# Patient Record
Sex: MALE | Race: WHITE | NOT HISPANIC OR LATINO | Employment: FULL TIME | URBAN - METROPOLITAN AREA
[De-identification: names, ages, dates, MRNs, and addresses within clinical notes are randomized per-mention and may not be internally consistent; named-entity substitution may affect disease eponyms.]

---

## 2017-02-27 DIAGNOSIS — E03.9 HYPOTHYROID: ICD-10-CM

## 2017-02-27 RX ORDER — LEVOTHYROXINE SODIUM 75 UG/1
TABLET ORAL
Qty: 90 TABLET | Refills: 1 | Status: SHIPPED | OUTPATIENT
Start: 2017-02-27 | End: 2017-09-13

## 2017-02-27 NOTE — TELEPHONE ENCOUNTER
levothyroxine      Last Written Prescription Date: 6/2/2016  Last Fill Quantity: 90, # refills: 2  Last Office Visit with [facility], [facility] or [facility] prescribing provider: 8/22/2016       Potassium   Date Value Ref Range Status   08/22/2016 4.9 3.4 - 5.3 mmol/L Final     Creatinine   Date Value Ref Range Status   08/22/2016 1.27 (H) 0.66 - 1.25 mg/dL Final     BP Readings from Last 3 Encounters:   01/24/17 94/50   08/22/16 118/74   08/18/16 117/73     Prescription approved per [facility] Refill Protocol.  Tameka Tang RN

## 2017-04-04 ENCOUNTER — HOSPITAL ENCOUNTER (OUTPATIENT)
Dept: CARDIOLOGY | Facility: CLINIC | Age: 52
Discharge: HOME | End: 2017-04-04
Attending: INTERNAL MEDICINE | Admitting: INTERNAL MEDICINE
Payer: COMMERCIAL

## 2017-04-04 DIAGNOSIS — Z98.890 S/P ASCENDING AORTIC ANEURYSM REPAIR: ICD-10-CM

## 2017-04-04 DIAGNOSIS — Z86.79 S/P ASCENDING AORTIC ANEURYSM REPAIR: ICD-10-CM

## 2017-04-04 PROCEDURE — 40000264 ECHO COMPLETE WITH OPTISON

## 2017-04-04 PROCEDURE — 25500064 ZZH RX 255 OP 636: Performed by: INTERNAL MEDICINE

## 2017-04-04 PROCEDURE — 93306 TTE W/DOPPLER COMPLETE: CPT | Mod: 26 | Performed by: INTERNAL MEDICINE

## 2017-04-04 RX ADMIN — HUMAN ALBUMIN MICROSPHERES AND PERFLUTREN 3 ML: 10; .22 INJECTION, SOLUTION INTRAVENOUS at 09:15

## 2017-04-05 ENCOUNTER — OFFICE VISIT (OUTPATIENT)
Dept: CARDIOLOGY | Facility: CLINIC | Age: 52
End: 2017-04-05
Payer: COMMERCIAL

## 2017-04-05 VITALS
SYSTOLIC BLOOD PRESSURE: 116 MMHG | BODY MASS INDEX: 44.1 KG/M2 | HEART RATE: 80 BPM | DIASTOLIC BLOOD PRESSURE: 72 MMHG | HEIGHT: 71 IN | WEIGHT: 315 LBS

## 2017-04-05 DIAGNOSIS — Z86.79 S/P AORTIC ANEURYSM REPAIR: Primary | ICD-10-CM

## 2017-04-05 DIAGNOSIS — Z98.890 S/P AORTIC ANEURYSM REPAIR: Primary | ICD-10-CM

## 2017-04-05 PROCEDURE — 99214 OFFICE O/P EST MOD 30 MIN: CPT | Performed by: INTERNAL MEDICINE

## 2017-04-05 RX ORDER — ACETAMINOPHEN 500 MG
500 TABLET ORAL EVERY 6 HOURS PRN
COMMUNITY

## 2017-04-05 NOTE — LETTER
4/5/2017    Laura Austin MD  [facility]  [address] [place] [address]    RE: Mihcael Esteban       Dear Colleague,    I had the pleasure of seeing Michael Esteban in the [facility].    REASON FOR CLINIC VISIT:  Followup, ascending aorta aneurysm repair.      Mr. Esteban is a very pleasant 51-year-old gentleman with history of ascending aortic aneurysm repair done in 02/2015 by Dr. Lechuga using a Gelweave 30 mm interposition graft.  The patient also had closure of patent foramen ovale at the same time.  In the followup, patient had CT chest done in 02/2016 that showed normal-sized aortic graft without any dilatation.        To be noted, the patient also had a coronary angiogram that showed only mild plaque in the proximal RCA without any evidence of obstructive coronary artery disease.  The patient has other comorbidities of hypertension, morbid obesity with BMI close to 50, obstructive sleep apnea on CPAP.      Today, he is coming for routine followup.  He had an echocardiogram done yesterday that was personally reviewed by me.  That showed normal LV function with mild to moderately dilated aortic root/ascending aorta.  The aortic root was 4 cm and the ascending aorta was measured 4.3 cm.  To be noted, the patient had an MRA done also in 08/2015 that showed normal-sized aortic graft measuring 3.5 cm in maximal diameter.        The patient tells me that unfortunately he got laid off from work last year and was without work for several months, but now has regained a job.  He did lose some weight but has regained it back.  Last year when I saw him, he weighed 358 pounds; today he weighs 354 pounds.  He has cut down his alcohol intake.  Earlier he was drinking a 6-pack of beer every day, but now he finishes a 6 pack in about 2 weeks' time.  He has also cut down on tobacco  and pot intake.  In fact, he tells me that he misses smoking pot,  but now because he has a 's licence; he has not had pot for about a year.       He does stationary bike 2 times a week for about 15-20 minutes.  He gets tired doing that but no shortness of breath or chest pain.  If he uses his upper body, especially if uses his arms he can get some soreness at the site of the surgical incision in the chest.        To be noted, he has a strong family history of aortic aneurysm with father and paternal grandfather both dying of aortic aneurysm in their mid 50s.  Last clinic visit, I strongly recommend the patient to undergo genetic counseling and also recommended patient to get his first-degree relatives evaluated for aortic aneurysm.  Unfortunately, patient did not see the genetic counselor.  He tells me that he is not sure whether he will follow through with this.  He is also somewhat concerned about insurance.        His blood pressure is well controlled on lisinopril and carvedilol.  He also has history of dyslipidemia and is on atorvastatin with lipid panel last year showing LDL of 43.      PHYSICAL EXAMINATION:   VITAL SIGNS:  Blood pressure 116/72, heart rate 80 and regular, weight 54 pounds, BMI of 49.5.   GENERAL:  The patient appears pleasant, comfortable.   NECK:  Normal JVP, no bruit.   CARDIOVASCULAR SYSTEM:  Well-healed midline sternotomy scar, nontender.  S1, S2 normal, no murmur, rub or gallop.   RESPIRATORY SYSTEM:  Clear to auscultation bilaterally.   ABDOMEN:  Soft, obese, nontender.   EXTREMITIES:  No pitting pedal edema.   NEUROLOGICAL:  Alert, oriented x3.   PSYCHIATRIC:  Normal affect.   SKIN:  No obvious rash.     HEENT:  No pallor or icterus.      Transthoracic echocardiogram as noted above; images personally reviewed.     Outpatient Encounter Prescriptions as of 4/5/2017   Medication Sig Dispense Refill     acetaminophen (TYLENOL) 500 MG tablet Take 500 mg by mouth every 6 hours as needed for mild pain       levothyroxine  (SYNTHROID/LEVOTHROID) 75 MCG tablet TAKE 1 TABLET (75 MCG) BY MOUTH DAILY 90 tablet 1     [DISCONTINUED] allopurinol (ZYLOPRIM) 100 MG tablet Take 1 tablet (100 mg) by mouth daily 90 tablet 2     [DISCONTINUED] lisinopril (PRINIVIL,ZESTRIL) 5 MG tablet TAKE 3 TABLETS (15 MG) BY MOUTH DAILY 270 tablet 2     [DISCONTINUED] carvedilol (COREG) 25 MG tablet Take 1 tablet (25 mg) by mouth 2 times daily (with meals) 60 tablet 11     [DISCONTINUED] atorvastatin (LIPITOR) 40 MG tablet TAKE 1 TABLET (40 MG) BY MOUTH AT BEDTIME FOR CHOLESTEROL 90 tablet 2     oxyCODONE (ROXICODONE) 5 MG immediate release tablet Take 1-2 tablets (5-10 mg) by mouth every 3 hours as needed for moderate to severe pain 60 tablet 0     order for DME Equipment being ordered: RESMED AIRSENSE 10 WITH HH AND RESMED AIRFIT P10 PILLOW MASK.       aspirin 81 MG chewable tablet Take 1 tablet (81 mg) by mouth daily 90 tablet 3     [DISCONTINUED] enalapril (VASOTEC) 5 MG tablet Take 5 mg by mouth       [DISCONTINUED] naproxen (NAPROSYN) 500 MG tablet        No facility-administered encounter medications on file as of 4/5/2017.       IMPRESSION AND PLAN:  A very delightful 51-year-old gentleman with history of aortic aneurysm repair done in 02/2015 (to be noted, this was done in the setting of rapidly enlarging aortic aneurysm which increased in size from 4.7 in 09/2014 to 5.2 in 01/2015), PFO closure, minimal coronary artery disease on pre-aortic aneurysm repair coronary angiogram, other of hypertension, obesity, dyslipidemia, obstructive sleep apnea on CPAP.  He also has a solitary kidney as he was a kidney donor in the early 1980s.  It looks like now he has chronic kidney disease, stage III.        He had an echocardiogram done that shows mildly enlarged ascending aorta at around 4.3 cm.  To be noted, post-aortic aneurysm repair CT and MRA haves shown stable size of aortic graft.  At this time, I recommend repeating an MRA aorta angiogram to make sure that  the graft is stable, as on echocardiogram it appears mildly enlarged.        We also talked at length about the importance of losing weight.  We talked about bariatric surgery options.  The patient tells me that he and his wife have talked about it, but he does not want to undergo bariatric surgery.  I also offered him a referral for a comprehensive weight loss bariatric surgery program, but he declined that.  Then we talked at length about watching his caloric intake and first I suggested to him that he should have an honest assessment of how much calorie intake he is taking in.  Normally calorie intake should be around 2694-8994 calories per day, but in order to lose weight there should be calorie deficit of around 500 calories a day.        I again discussed the importance of genetic counseling given that he has strong family history of aortic aneurysm.  The patient is not interested in genetic counseling.  I also strongly suggested to him to get his first-degree relatives evaluated for aortic aneurysm.        Further recommendation will depend upon the results of the MRA.  My office is going to update him with the results of the MRA aortogram.       Again, thank you for allowing me to participate in the care of your patient.      Sincerely,    Jose J Tipton MD     [facility]

## 2017-04-05 NOTE — MR AVS SNAPSHOT
"              After Visit Summary   4/5/2017    Michael Esteban    MRN: [id number]           Patient Information     Date Of Birth          1965        Visit Information        Provider Department      4/5/2017 4:15 PM Jose J Tipton MD [facility]        Today's Diagnoses     S/P aortic aneurysm repair    -  1       Follow-ups after your visit        Future tests that were ordered for you today     Open Future Orders        Priority Expected Expires Ordered    MRI Angiogram chest w & w/o contrast Routine 4/6/2017 4/5/2018 4/5/2017    ECHO COMPLETE WITH OPTISON Routine 3/10/2017 4/14/2017 3/10/2016            Who to contact     If you have questions or need follow up information about today's clinic visit or your schedule please contact [facility] directly at [phone].  Normal or non-critical lab and imaging results will be communicated to you by MyChart, letter or phone within 4 business days after the clinic has received the results. If you do not hear from us within 7 days, please contact the clinic through MyChart or phone. If you have a critical or abnormal lab result, we will notify you by phone as soon as possible.  Submit refill requests through [employer] or call your pharmacy and they will forward the refill request to us. Please allow 3 business days for your refill to be completed.          Additional Information About Your Visit        MyChart Information     [employer] lets you send messages to your doctor, view your test results, renew your prescriptions, schedule appointments and more. To sign up, go to [email or web address] . Click on \"Log in\" on the left side of the screen, which will take you to the Welcome page. Then click on \"Sign up Now\" on the right side of the page.     You will be asked to enter the access code listed below, as well as some personal information. Please follow the directions to " "create your username and password.     Your access code is: [email or web address]  Expires: 2017 10:36 AM     Your access code will  in 90 days. If you need help or a new code, please call your Portal clinic or [phone].        Care EveryWhere ID     This is your Care EveryWhere ID. This could be used by other organizations to access your Portal medical records  [id number]        Your Vitals Were     Pulse Height BMI (Body Mass Index)             80 1.803 m (5' 11\") 49.4 kg/m2          Blood Pressure from Last 3 Encounters:   17 116/72   17 94/50   16 118/74    Weight from Last 3 Encounters:   17 (!) 160.7 kg (354 lb 3.2 oz)   17 (!) 149.7 kg (330 lb)   16 (!) 153.3 kg (338 lb)               Primary Care Provider Office Phone # Fax #    Laura Austin [phone]       [facility] [address] [place] [address]        Thank you!     Thank you for choosing [facility] [place]  for your care. Our goal is always to provide you with excellent care. Hearing back from our patients is one way we can continue to improve our services. Please take a few minutes to complete the written survey that you may receive in the mail after your visit with us. Thank you!             Your Updated Medication List - Protect others around you: Learn how to safely use, store and throw away your medicines at www.disposemymeds.org.          This list is accurate as of: 17  5:07 PM.  Always use your most recent med list.                   Brand Name Dispense Instructions for use    allopurinol 100 MG tablet    ZYLOPRIM    90 tablet    Take 1 tablet (100 mg) by mouth daily       aspirin 81 MG chewable tablet     90 tablet    Take 1 tablet (81 mg) by mouth daily       atorvastatin 40 MG tablet    LIPITOR    90 tablet    TAKE 1 TABLET (40 MG) BY MOUTH AT BEDTIME FOR CHOLESTEROL       carvedilol 25 MG tablet    " COREG    60 tablet    Take 1 tablet (25 mg) by mouth 2 times daily (with meals)       levothyroxine 75 MCG tablet    SYNTHROID/LEVOTHROID    90 tablet    TAKE 1 TABLET (75 MCG) BY MOUTH DAILY       lisinopril 5 MG tablet    PRINIVIL/ZESTRIL    270 tablet    TAKE 3 TABLETS (15 MG) BY MOUTH DAILY       order for DME      Equipment being ordered: RESMED AIRSENSE 10 WITH HH AND RESMED AIRFIT P10 PILLOW MASK.       oxyCODONE 5 MG IR tablet    ROXICODONE    60 tablet    Take 1-2 tablets (5-10 mg) by mouth every 3 hours as needed for moderate to severe pain       TYLENOL 500 MG tablet   Generic drug:  acetaminophen      Take 500 mg by mouth every 6 hours as needed for mild pain

## 2017-04-05 NOTE — PROGRESS NOTES
HPI and Plan:   See dictation(#[id number])    Orders Placed This Encounter   Procedures     MRI Angiogram chest w & w/o contrast       Orders Placed This Encounter   Medications     acetaminophen (TYLENOL) 500 MG tablet     Sig: Take 500 mg by mouth every 6 hours as needed for mild pain       Medications Discontinued During This Encounter   Medication Reason     enalapril (VASOTEC) 5 MG tablet Discontinued by another Health Care Provider     naproxen (NAPROSYN) 500 MG tablet Discontinued by another Health Care Provider         Encounter Diagnosis   Name Primary?     S/P aortic aneurysm repair Yes       CURRENT MEDICATIONS:  Current Outpatient Prescriptions   Medication Sig Dispense Refill     acetaminophen (TYLENOL) 500 MG tablet Take 500 mg by mouth every 6 hours as needed for mild pain       levothyroxine (SYNTHROID/LEVOTHROID) 75 MCG tablet TAKE 1 TABLET (75 MCG) BY MOUTH DAILY 90 tablet 1     allopurinol (ZYLOPRIM) 100 MG tablet Take 1 tablet (100 mg) by mouth daily 90 tablet 2     lisinopril (PRINIVIL,ZESTRIL) 5 MG tablet TAKE 3 TABLETS (15 MG) BY MOUTH DAILY 270 tablet 2     carvedilol (COREG) 25 MG tablet Take 1 tablet (25 mg) by mouth 2 times daily (with meals) 60 tablet 11     atorvastatin (LIPITOR) 40 MG tablet TAKE 1 TABLET (40 MG) BY MOUTH AT BEDTIME FOR CHOLESTEROL 90 tablet 2     oxyCODONE (ROXICODONE) 5 MG immediate release tablet Take 1-2 tablets (5-10 mg) by mouth every 3 hours as needed for moderate to severe pain 60 tablet 0     order for DME Equipment being ordered: RESMED AIRSENSE 10 WITH HH AND RESMED AIRFIT P10 PILLOW MASK.       aspirin 81 MG chewable tablet Take 1 tablet (81 mg) by mouth daily 90 tablet 3       ALLERGIES     Allergies   Allergen Reactions     Thalidomide      Reaction during stress test         PAST MEDICAL HISTORY:  Past Medical History:   Diagnosis Date     Gout      Hyperlipidemia      Hypertension      Hypothyroidism      Kidney donor 1992     donated Left kidney to mother  in      Morbid (severe) obesity due to excess calories (H)     comorbid w/: Htn, Erectile dysfunction, Swelling of legs, Excessively sleep during the day, Awaken from sleep to catch breath, Shortness of Breath with activity, Loud snoring, Stop breathing while asleep.      Thoracic aortic aneurysm without rupture (H)     4.7 cm - s/p repair 2015       PAST SURGICAL HISTORY:  Past Surgical History:   Procedure Laterality Date     ARTHROSCOPY KNEE Right       COLONOSCOPY N/A 2017    Procedure: COLONOSCOPY;  Surgeon: Maikol Faulkner MD;  Location: [facility]     REPAIR ANEURYSM ASCENDING AORTA N/A 2015    Procedure: REPAIR ANEURYSM ASCENDING AORTA;  Surgeon: Monae Lechuga MD;  Location: [facility] OR     REPAIR PATENT FORAMEN OVALE N/A 2015    Procedure: REPAIR PATENT FORAMEN OVALE;  Surgeon: Monae Lechuga MD;  Location: [facility] OR     s/p left nephrectomy - donor to mother       TONSILLECTOMY  age 3        FAMILY HISTORY:  Family History   Problem Relation Age of Onset     Hypertension Mother      Genitourinary Problems Mother      renal failure - lived for 17 years after transplant from pt      Cardiovascular Father      MI at age 59 - heavy smoker      Sleep Apnea Father      Cardiovascular Paternal Grandfather 62     TAA - rupture/      Hypertension Sister        SOCIAL HISTORY:  Social History     Social History     Marital status:      Spouse name: Dacia Marquis     Number of children: 1     Years of education: N/A     Occupational History     really strenuous work  [employer]     Social History Main Topics     Smoking status: Current Some Day Smoker     Types: Pipe, Cigars     Smokeless tobacco: Never Used      Comment: strongly encourage full cessation secondary to TAA. Occ. cigar      Alcohol use 0.0 oz/week     0 Standard drinks or equivalent per week      Comment: couple of beers each week     Drug use: No      Comment: [place] smoke pot in [place]   "    Sexual activity: Yes     Partners: Female      Comment:       Other Topics Concern     Parent/Sibling W/ Cabg, Mi Or Angioplasty Before 65f 55m? No     Caffeine Concern No     0-1 a day     Exercise No     Seat Belt Yes     Social History Narrative    SonPrudencio - : 10/25/2003.                    Review of Systems:  Skin:  Negative       Eyes:  Positive for visual blurring reading glasses  ENT:  Negative      Respiratory:  Positive for sleep apnea;CPAP;dyspnea on exertion     Cardiovascular:    Positive for;fatigue chest soreness  Gastroenterology: Negative      Genitourinary:  not assessed      Musculoskeletal:  Positive for gout;joint pain right shoulder; cramping in hands  Neurologic:  Positive for headaches;numbness or tingling of hands    Psychiatric:  not assessed      Heme/Lymph/Imm:  Negative      Endocrine:  Positive for thyroid disorder      Physical Exam:  Vitals: /72 (BP Location: Right arm, Patient Position: Chair, Cuff Size: Adult Large)  Pulse 80  Ht 1.803 m (5' 11\")  Wt (!) 160.7 kg (354 lb 3.2 oz)  BMI 49.4 kg/m2    Constitutional:           Skin:           Head:           Eyes:           ENT:           Neck:           Chest:             Cardiac:                    Abdomen:           Vascular:                                          Extremities and Back:                 Neurological:                 CC  Laura Austin MD  [address] [address]                  "

## 2017-04-06 NOTE — PROGRESS NOTES
REASON FOR CLINIC VISIT:  Followup, ascending aorta aneurysm repair.      HISTORY OF PRESENT ILLNESS:  Mr. Esteban is a very pleasant 51-year-old gentleman with history of ascending aortic aneurysm repair done in 02/2015 by Dr. Lechuga using a Gelweave 30 mm interposition graft.  The patient also had closure of patent foramen ovale at the same time.  In the followup, patient had CT chest done in 02/2016 that showed normal-sized aortic graft without any dilatation.        To be noted, the patient also had a coronary angiogram that showed only mild plaque in the proximal RCA without any evidence of obstructive coronary artery disease.  The patient has other comorbidities of hypertension, morbid obesity with BMI close to 50, obstructive sleep apnea on CPAP.      Today, he is coming for routine followup.  He had an echocardiogram done yesterday that was personally reviewed by me.  That showed normal LV function with mild to moderately dilated aortic root/ascending aorta.  The aortic root was 4 cm and the ascending aorta was measured 4.3 cm.  To be noted, the patient had an MRA done also in 08/2015 that showed normal-sized aortic graft measuring 3.5 cm in maximal diameter.        The patient tells me that unfortunately he got laid off from work last year and was without work for several months, but now has regained a job.  He did lose some weight but has regained it back.  Last year when I saw him, he weighed 358 pounds; today he weighs 354 pounds.  He has cut down his alcohol intake.  Earlier he was drinking a 6-pack of beer every day, but now he finishes a 6 pack in about 2 weeks' time.  He has also cut down on tobacco  and pot intake.  In fact, he tells me that he misses smoking pot, but now because he has a 's licence; he has not had pot for about a year.       He does stationary bike 2 times a week for about 15-20 minutes.  He gets tired doing that but no shortness of breath or chest pain.  If he  uses his upper body, especially if uses his arms he can get some soreness at the site of the surgical incision in the chest.        To be noted, he has a strong family history of aortic aneurysm with father and paternal grandfather both dying of aortic aneurysm in their mid 50s.  Last clinic visit, I strongly recommend the patient to undergo genetic counseling and also recommended patient to get his first-degree relatives evaluated for aortic aneurysm.  Unfortunately, patient did not see the genetic counselor.  He tells me that he is not sure whether he will follow through with this.  He is also somewhat concerned about insurance.        His blood pressure is well controlled on lisinopril and carvedilol.  He also has history of dyslipidemia and is on atorvastatin with lipid panel last year showing LDL of 43.      PHYSICAL EXAMINATION:   VITAL SIGNS:  Blood pressure 116/72, heart rate 80 and regular, weight 54 pounds, BMI of 49.5.   GENERAL:  The patient appears pleasant, comfortable.   NECK:  Normal JVP, no bruit.   CARDIOVASCULAR SYSTEM:  Well-healed midline sternotomy scar, nontender.  S1, S2 normal, no murmur, rub or gallop.   RESPIRATORY SYSTEM:  Clear to auscultation bilaterally.   ABDOMEN:  Soft, obese, nontender.   EXTREMITIES:  No pitting pedal edema.   NEUROLOGICAL:  Alert, oriented x3.   PSYCHIATRIC:  Normal affect.   SKIN:  No obvious rash.     HEENT:  No pallor or icterus.      Transthoracic echocardiogram as noted above; images personally reviewed.      IMPRESSION AND PLAN:  A very delightful 51-year-old gentleman with history of aortic aneurysm repair done in 02/2015 (to be noted, this was done in the setting of rapidly enlarging aortic aneurysm which increased in size from 4.7 in 09/2014 to 5.2 in 01/2015), PFO closure, minimal coronary artery disease on pre-aortic aneurysm repair coronary angiogram, other of hypertension, obesity, dyslipidemia, obstructive sleep apnea on CPAP.  He also has a solitary  kidney as he was a kidney donor in the early 1980s.  It looks like now he has chronic kidney disease, stage III.        He had an echocardiogram done that shows mildly enlarged ascending aorta at around 4.3 cm.  To be noted, post-aortic aneurysm repair CT and MRA haves shown stable size of aortic graft.  At this time, I recommend repeating an MRA aorta angiogram to make sure that the graft is stable, as on echocardiogram it appears mildly enlarged.        We also talked at length about the importance of losing weight.  We talked about bariatric surgery options.  The patient tells me that he and his wife have talked about it, but he does not want to undergo bariatric surgery.  I also offered him a referral for a comprehensive weight loss bariatric surgery program, but he declined that.  Then we talked at length about watching his caloric intake and first I suggested to him that he should have an honest assessment of how much calorie intake he is taking in.  Normally calorie intake should be around 1145-1899 calories per day, but in order to lose weight there should be calorie deficit of around 500 calories a day.        I again discussed the importance of genetic counseling given that he has strong family history of aortic aneurysm.  The patient is not interested in genetic counseling.  I also strongly suggested to him to get his first-degree relatives evaluated for aortic aneurysm.        Further recommendation will depend upon the results of the MRA.  My office is going to update him with the results of the MRA aortogram.        cc:      Laura Austin MD    [address] [address]         KATHI MORIN MD             D: 2017 16:54   T: 2017 13:00   MT: MONSTER      Name:     JOSEFINA LEDEZMA   MRN:      [id number]        Account:      [id number]   :      1965           Service Date: 2017      Document: [id number]

## 2017-04-21 ENCOUNTER — HOSPITAL ENCOUNTER (OUTPATIENT)
Dept: CARDIOLOGY | Facility: CLINIC | Age: 52
Discharge: HOME | End: 2017-04-21
Attending: INTERNAL MEDICINE | Admitting: INTERNAL MEDICINE
Payer: COMMERCIAL

## 2017-04-21 DIAGNOSIS — Z86.79 S/P AORTIC ANEURYSM REPAIR: ICD-10-CM

## 2017-04-21 DIAGNOSIS — Z98.890 S/P AORTIC ANEURYSM REPAIR: ICD-10-CM

## 2017-04-21 LAB
CREAT BLD-MCNC: 1.1 MG/DL (ref 0.66–1.25)
GFR SERPL CREATININE-BSD FRML MDRD: 70 ML/MIN/1.7M2

## 2017-04-21 PROCEDURE — 82565 ASSAY OF CREATININE: CPT

## 2017-04-21 PROCEDURE — 71555 MRI ANGIO CHEST W OR W/O DYE: CPT

## 2017-04-21 PROCEDURE — 25000132 ZZH RX MED GY IP 250 OP 250 PS 637: Performed by: INTERNAL MEDICINE

## 2017-04-21 PROCEDURE — 71555 MRI ANGIO CHEST W OR W/O DYE: CPT | Mod: 26 | Performed by: INTERNAL MEDICINE

## 2017-04-21 PROCEDURE — 25500064 ZZH RX 255 OP 636: Performed by: INTERNAL MEDICINE

## 2017-04-21 PROCEDURE — A9577 INJ MULTIHANCE: HCPCS | Performed by: INTERNAL MEDICINE

## 2017-04-21 RX ORDER — ONDANSETRON 2 MG/ML
4 INJECTION INTRAMUSCULAR; INTRAVENOUS
Status: DISCONTINUED | OUTPATIENT
Start: 2017-04-21 | End: 2017-04-22 | Stop reason: HOSPADM

## 2017-04-21 RX ORDER — DIAZEPAM 5 MG
5 TABLET ORAL EVERY 30 MIN PRN
Status: DISCONTINUED | OUTPATIENT
Start: 2017-04-21 | End: 2017-04-22 | Stop reason: HOSPADM

## 2017-04-21 RX ORDER — ACYCLOVIR 200 MG/1
0-1 CAPSULE ORAL
Status: DISCONTINUED | OUTPATIENT
Start: 2017-04-21 | End: 2017-04-22 | Stop reason: HOSPADM

## 2017-04-21 RX ORDER — DIPHENHYDRAMINE HYDROCHLORIDE 50 MG/ML
25-50 INJECTION INTRAMUSCULAR; INTRAVENOUS
Status: DISCONTINUED | OUTPATIENT
Start: 2017-04-21 | End: 2017-04-22 | Stop reason: HOSPADM

## 2017-04-21 RX ORDER — METHYLPREDNISOLONE SODIUM SUCCINATE 125 MG/2ML
125 INJECTION, POWDER, LYOPHILIZED, FOR SOLUTION INTRAMUSCULAR; INTRAVENOUS
Status: DISCONTINUED | OUTPATIENT
Start: 2017-04-21 | End: 2017-04-22 | Stop reason: HOSPADM

## 2017-04-21 RX ORDER — DIPHENHYDRAMINE HCL 25 MG
25 CAPSULE ORAL
Status: DISCONTINUED | OUTPATIENT
Start: 2017-04-21 | End: 2017-04-22 | Stop reason: HOSPADM

## 2017-04-21 RX ADMIN — GADOBENATE DIMEGLUMINE 30 ML: 529 INJECTION, SOLUTION INTRAVENOUS at 16:31

## 2017-04-21 RX ADMIN — DIAZEPAM 5 MG: 5 TABLET ORAL at 15:14

## 2017-04-27 ENCOUNTER — TELEPHONE (OUTPATIENT)
Dept: FAMILY MEDICINE | Facility: CLINIC | Age: 52
End: 2017-04-27

## 2017-04-27 NOTE — TELEPHONE ENCOUNTER
Schedule appointment for Monday, May 8th at 10:30 am with Dr. Austin.  Left patient a detailed message regarding the appointment and to call us back if that time would not work for him.    Sunshine Milligan CMA

## 2017-04-27 NOTE — TELEPHONE ENCOUNTER
Reason for Call:  Same Day Appointment, Requested Provider:  Laura Austin MD    PCP: Laura Austin    Reason for visit: discuss MRI and imaging results    Duration of symptoms: ongoing    Have you been treated for this in the past? Yes    Additional comments: Asking for Monday May 8th as he has the day off.  His wife has a procedure he has to drop her off for at 10AM so he can do anytime between 1030 and noon or anytime after about 2PM.    Can we leave a detailed message on this number? YES    Phone number patient can be reached at: Cell number on file:    Telephone Information:   Mobile [phone]       Best Time:     Call taken on 4/27/2017 at 10:00 AM by Trinity Thomas

## 2017-04-28 ENCOUNTER — DOCUMENTATION ONLY (OUTPATIENT)
Dept: CARDIOLOGY | Facility: CLINIC | Age: 52
End: 2017-04-28

## 2017-04-28 NOTE — PATIENT INSTRUCTIONS
Chest MRI   Done post  OV 4-8-2017  Normal thoracic aortic dimensions after ascending aortic aneurysm  repair in 02/2015. No significant changes when compared to MRA of  08/17/2015    Patient has F/U visit with PMD on 5-8-2017

## 2017-05-02 NOTE — PROGRESS NOTES
Luis Tipton: When would you like to repeat and MRA for aorta-Looks like most recent study the graft was stable. Do you want a clinic visit as well? Thanks, Bill        Thoracic MRA with and without contrast and 3D reformations were  performed on a 1.5 T scanner to evaluate aortic dimensions in a  51-year-old male with PFO closure and ascending aortic aneurysm repair  with 30 mm Gelweave graft in 02/2015.      1. The aortic root is normal in size. The ascending aorta, arch, and  descending aorta are normal in diameter. There is no dissection seen.     2. The aortic arch is left sided. There is normal branching of the  arch vessels.     Bi-orthogonal luminal aortic dimensions with prior measurements from   08/17/2015 in parentheses are:     1. Aortic root at the sinuses of Valsalva = 3.9 cm x 3.9 cm (3.9 cm x  3.9 cm)  2. Sinotubular junction = 3.9 cm x 3.8 cm (3.8 cm x 3.8 cm)  3. Proximal anastomosis = 3.1 cm x 3.0 cm  4. Distal anastomosis = 3.2 cm x 3.1 cm  5. Proximal aortic arch (aorta at the origin of the innominate  artery) = 3.8 cm x 3.5 cm  6. Mid-aortic arch (between left common carotid and subclavian  arteries) = 3.0 cm x 2.8 cm  7. Proximal descending thoracic aorta (begins at the isthmus,  approximately 2 cm distal to left subclavian artery) = 2.7 cm x 2.6 cm  8. Mid-descending aorta (midpoint in length between Nos. 6 and 8) =  2.4 cm x 2.4 cm  9. Aorta at diaphragm (2 cm above the celiac axis origin) = 2.4 cm x  2.2 cm  10. Abdominal aorta at the celiac axis origin = 2.2 cm x 2.2 cm         IMPRESSION:     Normal thoracic aortic dimensions after ascending aortic aneurysm  repair in 02/2015. No significant changes when compared to MRA of  08/17/2015.                        IMPRESSION AND PLAN: A very delightful 51-year-old gentleman with history of aortic aneurysm repair done in 02/2015 (to be noted, this was done in the setting of rapidly enlarging aortic aneurysm which increased in size from 4.7 in  09/2014 to 5.2 in 01/2015), PFO closure, minimal coronary artery disease on pre-aortic aneurysm repair coronary angiogram, other of hypertension, obesity, dyslipidemia, obstructive sleep apnea on CPAP. He also has a solitary kidney as he was a kidney donor in the early 1980s. It looks like now he has chronic kidney disease, stage III.       He had an echocardiogram done that shows mildly enlarged ascending aorta at around 4.3 cm. To be noted, post-aortic aneurysm repair CT and MRA haves shown stable size of aortic graft. At this time, I recommend repeating an MRA aorta angiogram to make sure that the graft is stable, as on echocardiogram it appears mildly enlarged.       We also talked at length about the importance of losing weight. We talked about bariatric surgery options. The patient tells me that he and his wife have talked about it, but he does not want to undergo bariatric surgery. I also offered him a referral for a comprehensive weight loss bariatric surgery program, but he declined that. Then we talked at length about watching his caloric intake and first I suggested to him that he should have an honest assessment of how much calorie intake he is taking in. Normally calorie intake should be around 3138-7302 calories per day, but in order to lose weight there should be calorie deficit of around 500 calories a day.       I again discussed the importance of genetic counseling given that he has strong family history of aortic aneurysm. The patient is not interested in genetic counseling. I also strongly suggested to him to get his first-degree relatives evaluated for aortic aneurysm.       Further recommendation will depend upon the results of the MRA. My office is going to update him with the results of the MRA aortogram.

## 2017-05-03 NOTE — PROGRESS NOTES
Contacted patient to convey results of MRA of aorta and Dr. Tipton's recommendations. 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 He told me that he will call me back when he is on a break. Juventino

## 2017-05-03 NOTE — PROGRESS NOTES
Patient called in. Discussed the findings of the MRA showing stable Aorta following graft. Pleased to hear.   Has a f/u with Primary [facility] MD on 5/8/17.   Informed the Dr. Tipton is recommending f/u on a PRN basis.   Verbalized understanding.

## 2017-05-04 NOTE — PROGRESS NOTES
SUBJECTIVE:                                                    Michael Esteban is a 51 year old male who presents to clinic today for the following health issues:    Patient is here today to discuss MRI results: done 4/21/2017:   IMPRESSION:     Normal thoracic aortic dimensions after ascending aortic aneurysm  repair in 02/2015. No significant changes when compared to MRA of  08/17/2015.    Not having chest pain , but with really physical work , digging and lifting ,etc. - gets some mild pulling in his chest.    No chest pain or discomfort with intercourse, but hasn't had intercourse x almost two years secondary to wife's decreased desire.   Doesn't awaken with an erection in the am.  Able to get an erection and masturbate to completion intermittently.   Can lose an erection when having intercourse with wife. Pt requesting rx for viagra - printed off so he can get it from [place] - was too expensive here in [place].     Also needs letter for work regarding weight restriction.   Just gave 2 weeks notice from [facility].      Problem list and histories reviewed & adjusted, as indicated.  Additional history: as documented    Reviewed and updated as needed this visit by clinical staff  Tobacco  Allergies  Meds  Med Hx  Surg Hx  Fam Hx  Soc Hx      Reviewed and updated as needed this visit by Provider       Patient Active Problem List   Diagnosis     history of Thoracic aortic aneurysm without rupture - 4.7 cm on 9/8/2014 increased to 5.2 cm 1/2015 - repaired with gortex graft 2/17/2015      Kidney donor     Hypertension goal BP (blood pressure) < 140/90     Gout- seeing Rheumatology - Dr. Hope - [facility]     Hypothyroidism     Vertigo     Tobacco use disorder- pipe/cigar -started smoking cigars/pipe again 6/2015     Atypical chest pain     History of alcohol abuse-cut down  s/p TAA repair 2/27/2015 - down to 2-3 beers/week     S/P ascending aortic aneurysm repair     Fever      "CARDIOVASCULAR SCREENING; LDL GOAL LESS THAN 100- secondary to hx of TAA repair     SOB (shortness of breath)     BERNADETTE (obstructive sleep apnea)     Pain in joint, ankle and foot     Arthralgia of lower leg     Erectile dysfunction due to diseases classified elsewhere     Morbid obesity with BMI of 50.0-59.9, adult (H)     Morbid (severe) obesity due to excess calories (H)     Umbilical hernia without obstruction and without gangrene       Current Outpatient Prescriptions   Medication Sig Dispense Refill     acetaminophen (TYLENOL) 500 MG tablet Take 500 mg by mouth every 6 hours as needed for mild pain       levothyroxine (SYNTHROID/LEVOTHROID) 75 MCG tablet TAKE 1 TABLET (75 MCG) BY MOUTH DAILY 90 tablet 1     allopurinol (ZYLOPRIM) 100 MG tablet Take 1 tablet (100 mg) by mouth daily 90 tablet 2     lisinopril (PRINIVIL,ZESTRIL) 5 MG tablet TAKE 3 TABLETS (15 MG) BY MOUTH DAILY 270 tablet 2     carvedilol (COREG) 25 MG tablet Take 1 tablet (25 mg) by mouth 2 times daily (with meals) 60 tablet 11     atorvastatin (LIPITOR) 40 MG tablet TAKE 1 TABLET (40 MG) BY MOUTH AT BEDTIME FOR CHOLESTEROL 90 tablet 2     oxyCODONE (ROXICODONE) 5 MG immediate release tablet Take 1-2 tablets (5-10 mg) by mouth every 3 hours as needed for moderate to severe pain 60 tablet 0     order for DME Equipment being ordered: RESMED AIRSENSE 10 WITH HH AND RESMED AIRFIT P10 PILLOW MASK.       aspirin 81 MG chewable tablet Take 1 tablet (81 mg) by mouth daily 90 tablet 3          Allergies   Allergen Reactions     Thalidomide      Reaction during stress test            ROS:no flare ups in gout since    ROS: 12 point ROS neg other than the symptoms noted above    OBJECTIVE:                                                    /82 (BP Location: Left arm, Patient Position: Chair, Cuff Size: Adult Large)  Pulse 70  Temp 98.4  F (36.9  C) (Oral)  Ht 5' 11\" (1.803 m)  Wt (!) 343 lb 3.2 oz (155.7 kg)  SpO2 96%  BMI 47.87 kg/m2  Body mass " index is 47.87 kg/(m^2).   Wt Readings from Last 5 Encounters:   05/08/17 (!) 343 lb 3.2 oz (155.7 kg)   04/05/17 (!) 354 lb 3.2 oz (160.7 kg)   01/24/17 (!) 330 lb (149.7 kg)   08/22/16 (!) 338 lb (153.3 kg)   08/18/16 (!) 340 lb (154.2 kg)     GENERAL: healthy, alert, well nourished, well hydrated, no distress  HENT: ear canals- normal; TMs- normal; Nose- normal; Mouth- no ulcers, no lesions  NECK: no tenderness, no adenopathy, no asymmetry, no masses, no stiffness; thyroid- normal to palpation  RESP: lungs clear to auscultation - no rales, no rhonchi, no wheezes  CV: regular rates and rhythm, normal S1 S2, no S3 or S4 and no murmur, no click or rub -  ABDOMEN: soft, no tenderness, no  hepatosplenomegaly, no masses, normal bowel sounds  MS: extremities- no gross deformities noted, no edema    Echocardiogram 4/4/2017:   Interpretation Summary     The left ventricle is normal in size.  The visual ejection fraction is estimated at 60-65%.  The right ventricle is normal in structure, function and size.  The left atrium is borderline dilated.  The right atrium is borderline dilated.  There is mild trileaflet aortic sclerosis.  Aortic root is normal in size when corrected for age and body size (4.0 cm,  normal up to 4.4 cm)  Ascending aorta size is borderline when corrected for large body size (4.3 cm)  Ascending aorta size unchanged from 9/2014    TSH   Date Value Ref Range Status   08/22/2016 1.49 0.40 - 4.00 mU/L Final       Diagnostic test results:  [facility].      ASSESSMENT/PLAN:                                                        ICD-10-CM    1. history of Thoracic aortic aneurysm without rupture - 4.7 cm on 9/8/2014 increased to 5.2 cm 1/2015 - repaired with gortex graft 2/17/2015  I71.2    2. Morbid (severe) obesity due to excess calories (H) E66.01    3. Essential hypertension with goal blood pressure less than 140/90 I10 Comprehensive metabolic panel     CBC with platelets differential   4. S/P  ascending aortic aneurysm repair Z98.890     Z86.79    5. Acute gout of left foot, unspecified cause M10.9 Albumin Random Urine Quantitative     allopurinol (ZYLOPRIM) 100 MG tablet     Uric acid   6. Screening for prostate cancer Z12.5 PROSTATE SPEC ANTIGEN SCREEN   7. Screen for colon cancer Z12.11    8. Erectile dysfunction due to diseases classified elsewhere N52.1    9. Umbilical hernia without obstruction and without gangrene K42.9    10. CARDIOVASCULAR SCREENING; LDL GOAL LESS THAN 100- secondary to hx of TAA repair Z13.6 Lipid panel reflex to direct LDL   11. Kidney donor Z52.4    12. Hypertension goal BP (blood pressure) < 140/90 I10 lisinopril (PRINIVIL/ZESTRIL) 5 MG tablet     carvedilol (COREG) 25 MG tablet   13. Thoracic aortic aneurysm without rupture (H) I71.2 atorvastatin (LIPITOR) 40 MG tablet     I will call Dr. Tipton re: clearance for sildenafil (Viagra) .  Pt now has Class A license for  and would like to do that.   Please, call or return to clinic or go to the ER immediately if signs or symptoms worsen or fail to improve as anticipated.   See Patient Instructions    Start walking for exercise - If walking outside,   - rain or shine - walk a block, then come home, next day walk   2 blocks , then come home ; and then add a block further from home daily - work up to 30-45minutes daily or 3-4x/week - or can work  up to  3-4 miles briskly daily.       If walking on treadmill or at  the mall, start with 5 minutes first day, then 6 minutes next day , then 7 minutes next day, then 8 minutes next day, etc.   Gradually work up to the above goals.  No stopping.      Establish an exercise regimen. Activity goal: 45 minutes 5 days a week. New exercise routine: cardiovascular workout on exercise equipment, walking and weightlifting. Diet regimen was discussed and plan is self-directed dieting: reduce calories, reduce portions, reduce processed  carbs, increase fruits/vegetables and avoid sweets  "and supervised diet program. Avoid artificial sweeteners and \"diet\" drinks and sodas.       Recheck with me in 6 months again.          Laura Austin MD    [facility]      "

## 2017-05-08 ENCOUNTER — OFFICE VISIT (OUTPATIENT)
Dept: FAMILY MEDICINE | Facility: CLINIC | Age: 52
End: 2017-05-08
Payer: COMMERCIAL

## 2017-05-08 VITALS
BODY MASS INDEX: 44.1 KG/M2 | OXYGEN SATURATION: 96 % | TEMPERATURE: 98.4 F | HEART RATE: 70 BPM | SYSTOLIC BLOOD PRESSURE: 130 MMHG | HEIGHT: 71 IN | DIASTOLIC BLOOD PRESSURE: 82 MMHG | WEIGHT: 315 LBS

## 2017-05-08 DIAGNOSIS — I71.20 THORACIC AORTIC ANEURYSM WITHOUT RUPTURE (H): Primary | ICD-10-CM

## 2017-05-08 DIAGNOSIS — Z12.11 SCREEN FOR COLON CANCER: ICD-10-CM

## 2017-05-08 DIAGNOSIS — I10 ESSENTIAL HYPERTENSION WITH GOAL BLOOD PRESSURE LESS THAN 140/90: ICD-10-CM

## 2017-05-08 DIAGNOSIS — I71.20 THORACIC AORTIC ANEURYSM WITHOUT RUPTURE (H): ICD-10-CM

## 2017-05-08 DIAGNOSIS — I10 HYPERTENSION GOAL BP (BLOOD PRESSURE) < 140/90: ICD-10-CM

## 2017-05-08 DIAGNOSIS — K42.9 UMBILICAL HERNIA WITHOUT OBSTRUCTION AND WITHOUT GANGRENE: ICD-10-CM

## 2017-05-08 DIAGNOSIS — Z13.6 CARDIOVASCULAR SCREENING; LDL GOAL LESS THAN 100: ICD-10-CM

## 2017-05-08 DIAGNOSIS — Z52.4 KIDNEY DONOR: ICD-10-CM

## 2017-05-08 DIAGNOSIS — M10.9 ACUTE GOUT OF LEFT FOOT, UNSPECIFIED CAUSE: ICD-10-CM

## 2017-05-08 DIAGNOSIS — Z12.5 SCREENING FOR PROSTATE CANCER: ICD-10-CM

## 2017-05-08 DIAGNOSIS — Z86.79 S/P ASCENDING AORTIC ANEURYSM REPAIR: ICD-10-CM

## 2017-05-08 DIAGNOSIS — N52.1 ERECTILE DYSFUNCTION DUE TO DISEASES CLASSIFIED ELSEWHERE: ICD-10-CM

## 2017-05-08 DIAGNOSIS — Z98.890 S/P ASCENDING AORTIC ANEURYSM REPAIR: ICD-10-CM

## 2017-05-08 DIAGNOSIS — E66.01 MORBID (SEVERE) OBESITY DUE TO EXCESS CALORIES (H): ICD-10-CM

## 2017-05-08 LAB
ALBUMIN SERPL-MCNC: 3.5 G/DL (ref 3.4–5)
ALP SERPL-CCNC: 60 U/L (ref 40–150)
ALT SERPL W P-5'-P-CCNC: 29 U/L (ref 0–70)
ANION GAP SERPL CALCULATED.3IONS-SCNC: 6 MMOL/L (ref 3–14)
AST SERPL W P-5'-P-CCNC: 12 U/L (ref 0–45)
BASOPHILS # BLD AUTO: 0 10E9/L (ref 0–0.2)
BASOPHILS NFR BLD AUTO: 0.6 %
BILIRUB SERPL-MCNC: 0.5 MG/DL (ref 0.2–1.3)
BUN SERPL-MCNC: 12 MG/DL (ref 7–30)
CALCIUM SERPL-MCNC: 8.5 MG/DL (ref 8.5–10.1)
CHLORIDE SERPL-SCNC: 109 MMOL/L (ref 94–109)
CHOLEST SERPL-MCNC: 76 MG/DL
CO2 SERPL-SCNC: 27 MMOL/L (ref 20–32)
CREAT SERPL-MCNC: 1.15 MG/DL (ref 0.66–1.25)
DIFFERENTIAL METHOD BLD: NORMAL
EOSINOPHIL # BLD AUTO: 0.3 10E9/L (ref 0–0.7)
EOSINOPHIL NFR BLD AUTO: 5 %
ERYTHROCYTE [DISTWIDTH] IN BLOOD BY AUTOMATED COUNT: 13.2 % (ref 10–15)
GFR SERPL CREATININE-BSD FRML MDRD: 67 ML/MIN/1.7M2
GLUCOSE SERPL-MCNC: 91 MG/DL (ref 70–99)
HCT VFR BLD AUTO: 42.2 % (ref 40–53)
HDLC SERPL-MCNC: 30 MG/DL
HGB BLD-MCNC: 13.3 G/DL (ref 13.3–17.7)
LDLC SERPL CALC-MCNC: 31 MG/DL
LYMPHOCYTES # BLD AUTO: 2 10E9/L (ref 0.8–5.3)
LYMPHOCYTES NFR BLD AUTO: 37.3 %
MCH RBC QN AUTO: 30 PG (ref 26.5–33)
MCHC RBC AUTO-ENTMCNC: 31.5 G/DL (ref 31.5–36.5)
MCV RBC AUTO: 95 FL (ref 78–100)
MONOCYTES # BLD AUTO: 0.6 10E9/L (ref 0–1.3)
MONOCYTES NFR BLD AUTO: 11 %
NEUTROPHILS # BLD AUTO: 2.5 10E9/L (ref 1.6–8.3)
NEUTROPHILS NFR BLD AUTO: 46.1 %
NONHDLC SERPL-MCNC: 46 MG/DL
PLATELET # BLD AUTO: 212 10E9/L (ref 150–450)
POTASSIUM SERPL-SCNC: 4.6 MMOL/L (ref 3.4–5.3)
PROT SERPL-MCNC: 6.7 G/DL (ref 6.8–8.8)
PSA SERPL-ACNC: 1.4 UG/L (ref 0–4)
RBC # BLD AUTO: 4.44 10E12/L (ref 4.4–5.9)
SODIUM SERPL-SCNC: 142 MMOL/L (ref 133–144)
TRIGL SERPL-MCNC: 73 MG/DL
URATE SERPL-MCNC: 6.6 MG/DL (ref 3.5–7.2)
WBC # BLD AUTO: 5.4 10E9/L (ref 4–11)

## 2017-05-08 PROCEDURE — 80061 LIPID PANEL: CPT | Performed by: FAMILY MEDICINE

## 2017-05-08 PROCEDURE — 99214 OFFICE O/P EST MOD 30 MIN: CPT | Performed by: FAMILY MEDICINE

## 2017-05-08 PROCEDURE — 84550 ASSAY OF BLOOD/URIC ACID: CPT | Performed by: FAMILY MEDICINE

## 2017-05-08 PROCEDURE — 82043 UR ALBUMIN QUANTITATIVE: CPT | Performed by: FAMILY MEDICINE

## 2017-05-08 PROCEDURE — G0103 PSA SCREENING: HCPCS | Performed by: FAMILY MEDICINE

## 2017-05-08 PROCEDURE — 85025 COMPLETE CBC W/AUTO DIFF WBC: CPT | Performed by: FAMILY MEDICINE

## 2017-05-08 PROCEDURE — 80053 COMPREHEN METABOLIC PANEL: CPT | Performed by: FAMILY MEDICINE

## 2017-05-08 PROCEDURE — 36415 COLL VENOUS BLD VENIPUNCTURE: CPT | Performed by: FAMILY MEDICINE

## 2017-05-08 RX ORDER — ATORVASTATIN CALCIUM 40 MG/1
TABLET, FILM COATED ORAL
Qty: 90 TABLET | Refills: 1 | Status: SHIPPED | OUTPATIENT
Start: 2017-05-08 | End: 2017-11-03

## 2017-05-08 RX ORDER — ALLOPURINOL 100 MG/1
100 TABLET ORAL DAILY
Qty: 90 TABLET | Refills: 3 | Status: SHIPPED | OUTPATIENT
Start: 2017-05-08 | End: 2018-07-11

## 2017-05-08 RX ORDER — LISINOPRIL 5 MG/1
TABLET ORAL
Qty: 270 TABLET | Refills: 3 | Status: SHIPPED | OUTPATIENT
Start: 2017-05-08 | End: 2018-01-15 | Stop reason: DRUGHIGH

## 2017-05-08 RX ORDER — CARVEDILOL 25 MG/1
25 TABLET ORAL 2 TIMES DAILY WITH MEALS
Qty: 180 TABLET | Refills: 1 | Status: SHIPPED | OUTPATIENT
Start: 2017-05-08 | End: 2017-11-03

## 2017-05-08 NOTE — LETTER
[facility]  [address] [place] [id number] [phone]   May 15, 2017    Michael Esteban  [address] [facility] [id number]      Dear Michael,    Here is a summary of your recent test results:    -All of your labs are normal or well controlled currently.   Please stick with your current medications and  Recheck with me again in 3-6 months or sooner if needed.     Your test results are enclosed.      Please contact me if you have any questions.               Thank you very much for trusting [facility]..     Healthy regards,       Laura Austin M.D.          Results for orders placed or performed in visit on 05/08/17   PROSTATE SPEC ANTIGEN SCREEN   Result Value Ref Range    PSA 1.40 0 - 4 ug/L   Lipid panel reflex to direct LDL   Result Value Ref Range    Cholesterol 76 <200 mg/dL    Triglycerides 73 <150 mg/dL    HDL Cholesterol 30 (L) >39 mg/dL    LDL Cholesterol Calculated 31 <100 mg/dL    Non HDL Cholesterol 46 <130 mg/dL   Albumin Random Urine Quantitative   Result Value Ref Range    Creatinine Urine 61 mg/dL    Albumin Urine mg/L 43 mg/L    Albumin Urine mg/g Cr 70.56 (H) 0 - 17 mg/g Cr   Comprehensive metabolic panel   Result Value Ref Range    Sodium 142 133 - 144 mmol/L    Potassium 4.6 3.4 - 5.3 mmol/L    Chloride 109 94 - 109 mmol/L    Carbon Dioxide 27 20 - 32 mmol/L    Anion Gap 6 3 - 14 mmol/L    Glucose 91 70 - 99 mg/dL    Urea Nitrogen 12 7 - 30 mg/dL    Creatinine 1.15 0.66 - 1.25 mg/dL    GFR Estimate 67 >60 mL/min/1.7m2    GFR Estimate If Black 81 >60 mL/min/1.7m2    Calcium 8.5 8.5 - 10.1 mg/dL    Bilirubin Total 0.5 0.2 - 1.3 mg/dL    Albumin 3.5 3.4 - 5.0 g/dL    Protein Total 6.7 (L) 6.8 - 8.8 g/dL    Alkaline Phosphatase 60 40 - 150 U/L    ALT 29 0 - 70 U/L    AST 12 0 - 45 U/L   CBC with platelets differential   Result Value Ref Range    WBC 5.4 4.0 - 11.0 10e9/L    RBC Count  4.44 4.4 - 5.9 10e12/L    Hemoglobin 13.3 13.3 - 17.7 g/dL    Hematocrit 42.2 40.0 - 53.0 %    MCV 95 78 - 100 fl    MCH 30.0 26.5 - 33.0 pg    MCHC 31.5 31.5 - 36.5 g/dL    RDW 13.2 10.0 - 15.0 %    Platelet Count 212 150 - 450 10e9/L    Diff Method Automated Method     % Neutrophils 46.1 %    % Lymphocytes 37.3 %    % Monocytes 11.0 %    % Eosinophils 5.0 %    % Basophils 0.6 %    Absolute Neutrophil 2.5 1.6 - 8.3 10e9/L    Absolute Lymphocytes 2.0 0.8 - 5.3 10e9/L    Absolute Monocytes 0.6 0.0 - 1.3 10e9/L    Absolute Eosinophils 0.3 0.0 - 0.7 10e9/L    Absolute Basophils 0.0 0.0 - 0.2 10e9/L   Uric acid   Result Value Ref Range    Uric Acid 6.6 3.5 - 7.2 mg/dL

## 2017-05-08 NOTE — MR AVS SNAPSHOT
After Visit Summary   5/8/2017    Michael Esteban    MRN: [id number]           Patient Information     Date Of Birth          1965        Visit Information        Provider Department      5/8/2017 10:30 AM Laura Austin MD [facility]        Today's Diagnoses     history of Thoracic aortic aneurysm without rupture - 4.7 cm on 9/8/2014 increased to 5.2 cm 1/2015 - repaired with gortex graft 2/17/2015     -  1    Morbid (severe) obesity due to excess calories (H)        Essential hypertension with goal blood pressure less than 140/90        S/P ascending aortic aneurysm repair        Acute gout of left foot, unspecified cause        Screening for prostate cancer        Screen for colon cancer        Erectile dysfunction due to diseases classified elsewhere        Umbilical hernia without obstruction and without gangrene        CARDIOVASCULAR SCREENING; LDL GOAL LESS THAN 100- secondary to hx of TAA repair        Kidney donor        Hypertension goal BP (blood pressure) < 140/90        Thoracic aortic aneurysm without rupture (H)          Care Instructions    I will call Dr. Tipton re: clearance for sildenafil (Viagra) .  Pt now has Class A license for  and would like to do that.   Please, call or return to clinic or go to the ER immediately if signs or symptoms worsen or fail to improve as anticipated.   See Patient Instructions    Start walking for exercise - If walking outside,   - rain or shine - walk a block, then come home, next day walk   2 blocks , then come home ; and then add a block further from home daily - work up to 30-45minutes daily or 3-4x/week - or can work  up to  3-4 miles briskly daily.       If walking on treadmill or at  the mall, start with 5 minutes first day, then 6 minutes next day , then 7 minutes next day, then 8 minutes next day, etc.   Gradually work up to the above goals.  No stopping.      Establish an exercise regimen.  "Activity goal: 45 minutes 5 days a week. New exercise routine: cardiovascular workout on exercise equipment, walking and weightlifting. Diet regimen was discussed and plan is self-directed dieting: reduce calories, reduce portions, reduce processed  carbs, increase fruits/vegetables and avoid sweets and supervised diet program. Avoid artificial sweeteners and \"diet\" drinks and sodas.       Recheck with me in 6 months again.                Thank you for choosing [facility]  for your Health Care. It was a pleasure seeing you at your visit today. Please contact us with any questions or concerns you may have.                   Laura Austin MD                                  To reach your [facility] care team after hours call:   [phone]    Our clinic hours are:     Monday- 7:30 am - 7:00 pm                             Tuesday through Friday- 7:30 am - 5:00 pm                                        Saturday- 8:00 am - 12:00 pm                  Phone:  [phone]    Our pharmacy hours are:     Monday  8:00 am to 7:00 pm      Tuesday through Friday 8:00am to 6:00pm                        Saturday - 9:00 am to 1:00 pm      Sunday : Closed.              Phone:  [phone]      There is also information available at our web site:  [email or web address]    If your provider ordered any lab tests and you do not receive the results within 10 business days, please call the clinic.    If you need a medication refill please contact your pharmacy.  Please allow 2 business days for your refill to be completed.    Our clinic offers telephone visits and e visits.  Please ask one of your team members to explain more.      Use AVdirectt (secure email communication and access to your chart) to send your primary care provider a message or make an appointment. Ask someone on your Team how to sign up for [employer].                     Follow-ups after your visit        Who to contact     If you " "have questions or need follow up information about today's clinic visit or your schedule please contact [facility] directly at [phone].  Normal or non-critical lab and imaging results will be communicated to you by MyChart, letter or phone within 4 business days after the clinic has received the results. If you do not hear from us within 7 days, please contact the clinic through [employer] or phone. If you have a critical or abnormal lab result, we will notify you by phone as soon as possible.  Submit refill requests through [employer] or call your pharmacy and they will forward the refill request to us. Please allow 3 business days for your refill to be completed.          Additional Information About Your Visit        Welcome FundsharGild Information     [employer] lets you send messages to your doctor, view your test results, renew your prescriptions, schedule appointments and more. To sign up, go to [email or web address] . Click on \"Log in\" on the left side of the screen, which will take you to the Welcome page. Then click on \"Sign up Now\" on the right side of the page.     You will be asked to enter the access code listed below, as well as some personal information. Please follow the directions to create your username and password.     Your access code is: [email or web address]  Expires: 2017 11:38 AM     Your access code will  in 90 days. If you need help or a new code, please call your [place] clinic or [phone].        Care EveryWhere ID     This is your Care EveryWhere ID. This could be used by other organizations to access your [place] medical records  [id number]        Your Vitals Were     Pulse Temperature Height Pulse Oximetry BMI (Body Mass Index)       70 98.4  F (36.9  C) (Oral) 5' 11\" (1.803 m) 96% 47.87 kg/m2        Blood Pressure from Last 3 Encounters:   17 130/82   17 116/72   17 94/50    Weight from Last 3 Encounters:   17 (!) 343 lb 3.2 oz (155.7 kg) "   04/05/17 (!) 354 lb 3.2 oz (160.7 kg)   01/24/17 (!) 330 lb (149.7 kg)              We Performed the Following     Albumin Random Urine Quantitative     CBC with platelets differential     Comprehensive metabolic panel     Lipid panel reflex to direct LDL     PROSTATE SPEC ANTIGEN SCREEN     Uric acid          Where to get your medicines      These medications were sent to [id number] - [place], [place] - [address]  [address] [address], [place] [address]     Phone:  [phone]     allopurinol 100 MG tablet    atorvastatin 40 MG tablet    carvedilol 25 MG tablet    lisinopril 5 MG tablet          Primary Care Provider Office Phone # Fax #    Laura Austin [phone]       [address] [address]        Thank you!     Thank you for choosing [facility]  for your care. Our goal is always to provide you with excellent care. Hearing back from our patients is one way we can continue to improve our services. Please take a few minutes to complete the written survey that you may receive in the mail after your visit with us. Thank you!             Your Updated Medication List - Protect others around you: Learn how to safely use, store and throw away your medicines at www.disposemymeds.org.          This list is accurate as of: 5/8/17 11:38 AM.  Always use your most recent med list.                   Brand Name Dispense Instructions for use    allopurinol 100 MG tablet    ZYLOPRIM    90 tablet    Take 1 tablet (100 mg) by mouth daily       aspirin 81 MG chewable tablet     90 tablet    Take 1 tablet (81 mg) by mouth daily       atorvastatin 40 MG tablet    LIPITOR    90 tablet    TAKE 1 TABLET (40 MG) BY MOUTH AT BEDTIME FOR CHOLESTEROL       carvedilol 25 MG tablet    COREG    180 tablet    Take 1 tablet (25 mg) by mouth 2 times daily (with meals)       levothyroxine 75 MCG tablet    SYNTHROID/LEVOTHROID    90 tablet     TAKE 1 TABLET (75 MCG) BY MOUTH DAILY       lisinopril 5 MG tablet    PRINIVIL/ZESTRIL    270 tablet    TAKE 3 TABLETS (15 MG) BY MOUTH DAILY       order for DME      Equipment being ordered: RESMED AIRSENSE 10 WITH HH AND RESMED AIRFIT P10 PILLOW MASK.       oxyCODONE 5 MG IR tablet    ROXICODONE    60 tablet    Take 1-2 tablets (5-10 mg) by mouth every 3 hours as needed for moderate to severe pain       TYLENOL 500 MG tablet   Generic drug:  acetaminophen      Take 500 mg by mouth every 6 hours as needed for mild pain

## 2017-05-08 NOTE — PATIENT INSTRUCTIONS
"I will call Dr. Tipton re: clearance for sildenafil (Viagra) .  Pt now has Class A license for  and would like to do that.   Please, call or return to clinic or go to the ER immediately if signs or symptoms worsen or fail to improve as anticipated.   See Patient Instructions    Start walking for exercise - If walking outside,   - rain or shine - walk a block, then come home, next day walk   2 blocks , then come home ; and then add a block further from home daily - work up to 30-45minutes daily or 3-4x/week - or can work  up to  3-4 miles briskly daily.       If walking on treadmill or at  the mall, start with 5 minutes first day, then 6 minutes next day , then 7 minutes next day, then 8 minutes next day, etc.   Gradually work up to the above goals.  No stopping.      Establish an exercise regimen. Activity goal: 45 minutes 5 days a week. New exercise routine: cardiovascular workout on exercise equipment, walking and weightlifting. Diet regimen was discussed and plan is self-directed dieting: reduce calories, reduce portions, reduce processed  carbs, increase fruits/vegetables and avoid sweets and supervised diet program. Avoid artificial sweeteners and \"diet\" drinks and sodas.       Recheck with me in 6 months again.                Thank you for choosing [facility]  for your Health Care. It was a pleasure seeing you at your visit today. Please contact us with any questions or concerns you may have.                   Laura Austin MD                                  To reach your [facility] care team after hours call:   [phone]    Our clinic hours are:     Monday- 7:30 am - 7:00 pm                             Tuesday through Friday- 7:30 am - 5:00 pm                                        Saturday- 8:00 am - 12:00 pm                  Phone:  [phone]    Our pharmacy hours are:     Monday  8:00 am to 7:00 pm      Tuesday through Friday 8:00am to " 6:00pm                        Saturday - 9:00 am to 1:00 pm      Sunday : Closed.              Phone:  [phone]      There is also information available at our web site:  [email or web address]    If your provider ordered any lab tests and you do not receive the results within 10 business days, please call the clinic.    If you need a medication refill please contact your pharmacy.  Please allow 2 business days for your refill to be completed.    Our clinic offers telephone visits and e visits.  Please ask one of your team members to explain more.      Use [employer] (secure email communication and access to your chart) to send your primary care provider a message or make an appointment. Ask someone on your Team how to sign up for [employer].

## 2017-05-08 NOTE — NURSING NOTE
"Chief Complaint   Patient presents with     Results       Initial /82 (BP Location: Left arm, Patient Position: Chair, Cuff Size: Adult Large)  Pulse 70  Temp 98.4  F (36.9  C) (Oral)  Ht 5' 11\" (1.803 m)  Wt (!) 343 lb 3.2 oz (155.7 kg)  SpO2 96%  BMI 47.87 kg/m2 Estimated body mass index is 47.87 kg/(m^2) as calculated from the following:    Height as of this encounter: 5' 11\" (1.803 m).    Weight as of this encounter: 343 lb 3.2 oz (155.7 kg).  Medication Reconciliation: complete   Sunshine Milligan CMA  "

## 2017-05-08 NOTE — LETTER
[address] [address]  [phone]          May 8, 2017    RE:  Michael Esteban                                                                                                                                                       [address] [address]            To whom it may concern:    Michael Esteban is under my professional care for    Thoracic aortic aneurysm without rupture (H) - s/p repair in 2/2015.   He  may return to work with the following: lifting restriction 75 pounds, no other restrictions.  Team lift preferred with greater than 50 pounds.         Sincerely,           Laura Austin M.D.

## 2017-05-09 LAB
CREAT UR-MCNC: 61 MG/DL
MICROALBUMIN UR-MCNC: 43 MG/L
MICROALBUMIN/CREAT UR: 70.56 MG/G CR (ref 0–17)

## 2017-09-13 DIAGNOSIS — E03.9 HYPOTHYROID: ICD-10-CM

## 2017-09-13 RX ORDER — LEVOTHYROXINE SODIUM 75 UG/1
TABLET ORAL
Qty: 90 TABLET | Refills: 0 | Status: SHIPPED | OUTPATIENT
Start: 2017-09-13 | End: 2018-01-21

## 2017-09-13 NOTE — TELEPHONE ENCOUNTER
levothyroxine (SYNTHROID/LEVOTHROID) 75 MCG tablet     Last Written Prescription Date: 2/27/2017  Last Quantity: 90 tablet, # refills: x  Last Office Visit with [facility], [facility] or  Health prescribing provider: x        TSH   Date Value Ref Range Status   08/22/2016 1.49 0.40 - 4.00 mU/L Final

## 2017-09-13 NOTE — TELEPHONE ENCOUNTER
LOV 5/8/2017  levothyroxine (SYNTHROID/LEVOTHROID) 75 MCG tablet 90 tablet 1 2/27/2017  No   Sig: TAKE 1 TABLET (75 MCG) BY MOUTH DAILY     Due for an Office visit for further refills, only fill for 30 days         oSl Crane RN, BSN  [facility]

## 2017-10-07 ENCOUNTER — HOSPITAL ENCOUNTER (EMERGENCY)
Facility: CLINIC | Age: 52
Discharge: HOME | End: 2017-10-07
Attending: PHYSICIAN ASSISTANT | Admitting: PHYSICIAN ASSISTANT
Payer: COMMERCIAL

## 2017-10-07 VITALS
TEMPERATURE: 98.8 F | SYSTOLIC BLOOD PRESSURE: 150 MMHG | DIASTOLIC BLOOD PRESSURE: 83 MMHG | OXYGEN SATURATION: 97 % | RESPIRATION RATE: 16 BRPM

## 2017-10-07 DIAGNOSIS — H60.501 ACUTE OTITIS EXTERNA OF RIGHT EAR, UNSPECIFIED TYPE: ICD-10-CM

## 2017-10-07 PROCEDURE — 99282 EMERGENCY DEPT VISIT SF MDM: CPT

## 2017-10-07 RX ORDER — NEOMYCIN SULFATE, POLYMYXIN B SULFATE AND HYDROCORTISONE 10; 3.5; 1 MG/ML; MG/ML; [USP'U]/ML
4 SUSPENSION/ DROPS AURICULAR (OTIC) 4 TIMES DAILY
Qty: 6 ML | Refills: 0 | Status: SHIPPED | OUTPATIENT
Start: 2017-10-07 | End: 2017-10-14

## 2017-10-07 NOTE — ED AVS SNAPSHOT
" [facility] [place] [facility]    [address]    [facility] [address]    Phone:  [phone]    Fax:  [phone]                                       Michael Esteban   MRN: [id number]    Department:  [facility] [place] [facility]   Date of Visit:  10/7/2017           Patient Information     Date Of Birth          1965        Your diagnoses for this visit were:     Acute otitis externa of right ear, unspecified type        You were seen by Kandi Suarez PA-C.      Follow-up Information     Follow up with [facility] [place] [facility].    Specialty:  EMERGENCY MEDICINE    Why:  If symptoms worsen    Contact information:    [address]  [facility] [address] [phone]        Follow up with [facility] In 3 days.    Why:  if not improving    Contact information:    [address] [place] [address] [phone]        Discharge Instructions       Discharge Instructions  Otitis Externa    Today you were seen for otitis externa which is a condition that occurs when the ear canal, which is the area that leads from the outer ear to the ear drum, becomes irritated as a result of an infection, allergy, or skin problem.   The most common symptoms of this are:  pain in the outer ear, especially when the ear is moved, itchiness of the ear, fluid or pus leaking from the ear and difficulty hearing clearly.  \"Swimmer's ear\" is the name for external otitis that occurs in a person who swims frequently.    Generally, every Emergency Department visit should have a follow-up clinic visit with either a primary or a specialty clinic/provider. Please follow-up as instructed by your emergency provider today.    Return to the Emergency Department if:    Your symptoms get worse, or if you develop a severe headache, stiff neck, or new symptoms.    The pain and swelling spread to your face.    You " develop high fever.      Treatment:    Treatment of otitis externa aims to reduce pain and eliminate the infection.   You should take either Advil  (ibuprofen) or Tylenol  (acetaminophen) for control of the ear pain.      Ear drops -- Ear drops are usually prescribed to both reduce pain and swelling and kill the bacteria which causes external otitis. It is important to apply the ear drops correctly so that they reach the ear canal:  o Lie on your side or tilt your head towards the opposite shoulder.  o Fill the ear canal with drops.  o Lie on your side for 20 minutes or place a cotton ball in the ear canal for 20 minutes.  o Finish the entire course of treatment, even if you begin to feel better within a few days.  o Avoid getting ears wet -- during treatment, you should avoid getting the inside of your ears wet. While showering, you can place a cotton ball coated with petroleum jelly in the ear. However, you should not swim for 7 to 10 days after starting treatment. Avoid wearing hearing aids and in-ear headphones until pain improves.  o If a wick has been placed in your ear, please do not remove it until seen by your primary provider or Ear, Nose, and Throat (ENT) specialist as directed.    Prevention:    Do not clean ear canal. Ear wax serves to protect the ears from water, bacteria, and injury. Excessive cleaning or scratching can injure the skin, potentially leading to infection.    Swimming on a regular basis removes some of the ear wax, allowing water to soften the skin. Bacteria, which normally live in the ear canal, can then enter the skin more easily.    Wearing devices that block the ear canals, such as hearing aids, headphones, or ear plugs, can increase the risk of external otitis (if worn frequently) by injuring the skin.    If you swim frequently, experts recommend the following tips to reduce the chance of developing external otitis:    Shake your ears dry after swimming.    Use ear drops after  swimming to prevent ear infections; these are available at most pharmacies without a prescription.    Consider wearing ear plugs made for swimming.  If you were given a prescription for medicine here today, be sure to read all of the information (including the package insert) that comes with your prescription.  This will include important information about the medicine, its side effects, and any warnings that you need to know about.  The pharmacist who fills the prescription can provide more information and answer questions you may have about the medicine.  If you have questions or concerns that the pharmacist cannot address, please call or return to the Emergency Department.   Remember that you can always come back to the Emergency Department if you are not able to see your regular provider in the amount of time listed above, if you get any new symptoms, or if there is anything that worries you.      24 Hour Appointment Hotline       To make an appointment at any [facility], call [phone] ([phone]). If you don't have a family doctor or clinic, we will help you find one. [facility] clinics are conveniently located to serve the needs of you and your family.             Review of your medicines      START taking        Dose / Directions Last dose taken    neomycin-polymyxin-hydrocortisone 3.5-75006-4 otic suspension   Commonly known as:  CORTISPORIN   Dose:  4 drop   Quantity:  6 mL        Place 4 drops into the right ear 4 times daily for 7 days   Refills:  0          Our records show that you are taking the medicines listed below. If these are incorrect, please call your family doctor or clinic.        Dose / Directions Last dose taken    allopurinol 100 MG tablet   Commonly known as:  ZYLOPRIM   Dose:  100 mg   Quantity:  90 tablet        Take 1 tablet (100 mg) by mouth daily   Refills:  3        aspirin 81 MG chewable tablet   Dose:  81 mg   Quantity:  90 tablet        Take 1 tablet (81 mg) by  mouth daily   Refills:  3        atorvastatin 40 MG tablet   Commonly known as:  LIPITOR   Quantity:  90 tablet        TAKE 1 TABLET (40 MG) BY MOUTH AT BEDTIME FOR CHOLESTEROL   Refills:  1        carvedilol 25 MG tablet   Commonly known as:  COREG   Dose:  25 mg   Quantity:  180 tablet        Take 1 tablet (25 mg) by mouth 2 times daily (with meals)   Refills:  1        levothyroxine 75 MCG tablet   Commonly known as:  SYNTHROID/LEVOTHROID   Quantity:  90 tablet        TAKE 1 TABLET BY MOUTH ONCE DAILY   Refills:  0        lisinopril 5 MG tablet   Commonly known as:  PRINIVIL/ZESTRIL   Quantity:  270 tablet        TAKE 3 TABLETS (15 MG) BY MOUTH DAILY   Refills:  3        order for DME        Equipment being ordered: RESMED AIRSENSE 10 WITH HH AND RESMED AIRFIT P10 PILLOW MASK.   Refills:  0        oxyCODONE 5 MG IR tablet   Commonly known as:  ROXICODONE   Dose:  5-10 mg   Quantity:  60 tablet        Take 1-2 tablets (5-10 mg) by mouth every 3 hours as needed for moderate to severe pain   Refills:  0        TYLENOL 500 MG tablet   Dose:  500 mg   Generic drug:  acetaminophen        Take 500 mg by mouth every 6 hours as needed for mild pain   Refills:  0                Prescriptions were sent or printed at these locations (1 Prescription)                   Other Prescriptions                Printed at Department/Unit printer (1 of 1)         neomycin-polymyxin-hydrocortisone (CORTISPORIN) 3.5-60951-3 otic suspension                Orders Needing Specimen Collection     None      Pending Results     No orders found from 10/5/2017 to 10/8/2017.            Pending Culture Results     No orders found from 10/5/2017 to 10/8/2017.            Pending Results Instructions     If you had any lab results that were not finalized at the time of your Discharge, you can call the ED Lab Result RN at [phone]. You will be contacted by this team for any positive Lab results or changes in treatment. The nurses are available 7  days a week from 10A to 6:30P.  You can leave a message 24 hours per day and they will return your call.        Test Results From Your Hospital Stay               Clinical Quality Measure: Blood Pressure Screening     Your blood pressure was checked while you were in the emergency department today. The last reading we obtained was  BP: 150/83 . Please read the guidelines below about what these numbers mean and what you should do about them.  If your systolic blood pressure (the top number) is less than 120 and your diastolic blood pressure (the bottom number) is less than 80, then your blood pressure is normal. There is nothing more that you need to do about it.  If your systolic blood pressure (the top number) is 120-139 or your diastolic blood pressure (the bottom number) is 80-89, your blood pressure may be higher than it should be. You should have your blood pressure rechecked within a year by a primary care provider.  If your systolic blood pressure (the top number) is 140 or greater or your diastolic blood pressure (the bottom number) is 90 or greater, you may have high blood pressure. High blood pressure is treatable, but if left untreated over time it can put you at risk for heart attack, stroke, or kidney failure. You should have your blood pressure rechecked by a primary care provider within the next 4 weeks.  If your provider in the emergency department today gave you specific instructions to follow-up with your doctor or provider even sooner than that, you should follow that instruction and not wait for up to 4 weeks for your follow-up visit.        Thank you for choosing [facility]       Thank you for choosing [facility] for your care. Our goal is always to provide you with excellent care. Hearing back from our patients is one way we can continue to improve our services. Please take a few minutes to complete the written survey that you may receive in the mail after you visit with us. Thank you!       "  MyChart Information     [facility] lets you send messages to your doctor, view your test results, renew your prescriptions, schedule appointments and more. To sign up, go to [email or web address] . Click on \"Log in\" on the left side of the screen, which will take you to the Welcome page. Then click on \"Sign up Now\" on the right side of the page.     You will be asked to enter the access code listed below, as well as some personal information. Please follow the directions to create your username and password.     Your access code is: [email or web address]  Expires: 2018  7:44 PM     Your access code will  in 90 days. If you need help or a new code, please call your [facility] clinic or [phone].        Care EveryWhere ID     This is your Care EveryWhere ID. This could be used by other organizations to access your [facility] medical records  [id number]        Equal Access to Services     CHERIE NEWSOME : Hadfrancis adeno Sofabio, waaxda luqadaha, qaybta kaalmada adeholger, sabas genao. So [facility] [phone].    ATENCIÓN: Si habla español, tiene a leigh disposición servicios gratuitos de asistencia lingüística. Llame al [phone].    We comply with applicable federal civil rights laws and [place] laws. We do not discriminate on the basis of race, color, national origin, age, disability, sex, sexual orientation, or gender identity.            After Visit Summary       This is your record. Keep this with you and show to your community pharmacist(s) and doctor(s) at your next visit.                  "

## 2017-10-07 NOTE — ED AVS SNAPSHOT
[facility] Emergency Department    [address]    [facility] [address]    Phone:  [phone]    Fax:  [phone]                                       Michael Esteban   MRN: [id number]    Department:  [facility]   Date of Visit:  10/7/2017           After Visit Summary Signature Page     I have received my discharge instructions, and my questions have been answered. I have discussed any challenges I see with this plan with the nurse or doctor.    ..........................................................................................................................................  Patient/Patient Representative Signature      ..........................................................................................................................................  Patient Representative Print Name and Relationship to Patient    ..................................................               ................................................  Date                                            Time    ..........................................................................................................................................  Reviewed by Signature/Title    ...................................................              ..............................................  Date                                                            Time

## 2017-10-07 NOTE — LETTER
[facility] EMERGENCY DEPARTMENT  [address]  [facility] [address]  Phone: [phone]  Fax: [phone]    October 7, 2017        Michael Esteban  [facility] [address]          To whom it may concern:    RE: Michael Esteban    Please excuse Juan from work for 3 days should he develop any dizziness related to his ear infection.     Please contact me for questions or concerns.      Sincerely,  Kandi Suarez PA-C

## 2017-10-08 NOTE — DISCHARGE INSTRUCTIONS
"Discharge Instructions  Otitis Externa    Today you were seen for otitis externa which is a condition that occurs when the ear canal, which is the area that leads from the outer ear to the ear drum, becomes irritated as a result of an infection, allergy, or skin problem.   The most common symptoms of this are:  pain in the outer ear, especially when the ear is moved, itchiness of the ear, fluid or pus leaking from the ear and difficulty hearing clearly.  \"Swimmer's ear\" is the name for external otitis that occurs in a person who swims frequently.    Generally, every Emergency Department visit should have a follow-up clinic visit with either a primary or a specialty clinic/provider. Please follow-up as instructed by your emergency provider today.    Return to the Emergency Department if:    Your symptoms get worse, or if you develop a severe headache, stiff neck, or new symptoms.    The pain and swelling spread to your face.    You develop high fever.      Treatment:    Treatment of otitis externa aims to reduce pain and eliminate the infection.   You should take either Advil  (ibuprofen) or Tylenol  (acetaminophen) for control of the ear pain.      Ear drops -- Ear drops are usually prescribed to both reduce pain and swelling and kill the bacteria which causes external otitis. It is important to apply the ear drops correctly so that they reach the ear canal:  o Lie on your side or tilt your head towards the opposite shoulder.  o Fill the ear canal with drops.  o Lie on your side for 20 minutes or place a cotton ball in the ear canal for 20 minutes.  o Finish the entire course of treatment, even if you begin to feel better within a few days.  o Avoid getting ears wet -- during treatment, you should avoid getting the inside of your ears wet. While showering, you can place a cotton ball coated with petroleum jelly in the ear. However, you should not swim for 7 to 10 days after starting treatment. Avoid wearing hearing " aids and in-ear headphones until pain improves.  o If a wick has been placed in your ear, please do not remove it until seen by your primary provider or Ear, Nose, and Throat (ENT) specialist as directed.    Prevention:    Do not clean ear canal. Ear wax serves to protect the ears from water, bacteria, and injury. Excessive cleaning or scratching can injure the skin, potentially leading to infection.    Swimming on a regular basis removes some of the ear wax, allowing water to soften the skin. Bacteria, which normally live in the ear canal, can then enter the skin more easily.    Wearing devices that block the ear canals, such as hearing aids, headphones, or ear plugs, can increase the risk of external otitis (if worn frequently) by injuring the skin.    If you swim frequently, experts recommend the following tips to reduce the chance of developing external otitis:    Shake your ears dry after swimming.    Use ear drops after swimming to prevent ear infections; these are available at most pharmacies without a prescription.    Consider wearing ear plugs made for swimming.  If you were given a prescription for medicine here today, be sure to read all of the information (including the package insert) that comes with your prescription.  This will include important information about the medicine, its side effects, and any warnings that you need to know about.  The pharmacist who fills the prescription can provide more information and answer questions you may have about the medicine.  If you have questions or concerns that the pharmacist cannot address, please call or return to the Emergency Department.   Remember that you can always come back to the Emergency Department if you are not able to see your regular provider in the amount of time listed above, if you get any new symptoms, or if there is anything that worries you.

## 2017-10-08 NOTE — ED PROVIDER NOTES
History     Chief Complaint:  Ear Discomfort     HPI   Michael Esteban is a 52 year old male who presents to the emergency department today for evaluation of ear discomfort. The patient was cutting wood today when he felt something crawl into his right ear. He attempted to clean out his ear with a Q tip and flush it out with water but he still felt something moving. Here, he states he is primarily concerned about this being a tick. He has not felt any movement in his ear here in the ED. No other concerns voiced.     Allergies:  Thalidomide    Medications:    levothyroxine (SYNTHROID/LEVOTHROID) 75 MCG tablet  allopurinol (ZYLOPRIM) 100 MG tablet  lisinopril (PRINIVIL/ZESTRIL) 5 MG tablet  carvedilol (COREG) 25 MG tablet  atorvastatin (LIPITOR) 40 MG tablet  acetaminophen (TYLENOL) 500 MG tablet  oxyCODONE (ROXICODONE) 5 MG immediate release tablet  aspirin 81 MG chewable tablet     Past Medical History:    Gout   Hyperlipidemia   Hypertension   Hypothyroidism   Kidney donor   Morbid (severe) obesity due to excess calories (H)   Thoracic aortic aneurysm without rupture (H)    Past Surgical History:    ARTHROSCOPY KNEE   COLONOSCOPY   REPAIR ANEURYSM ASCENDING AORTA   REPAIR PATENT FORAMEN OVALE   s/p left nephrectomy - donor to mother   TONSILLECTOMY     Family History:    Hypertension   Renal failure  Sleep apnea   TAA    Social History:  The patient was accompanied to the ED by wife.  Smoking Status: Current some day smoker   Smokeless Tobacco: Never used  Alcohol Use: No  Marital Status:   [2]     Review of Systems   HENT:        Ear discomfort    All other systems reviewed and are negative.    Physical Exam   First Vitals:  BP: 150/83  Heart Rate: 73  Temp: 98.8  F (37.1  C)  Resp: 16  SpO2: 97 %      Physical Exam  Nursing note and vitals reviewed.     GENERAL: Alert, mild distress, non toxic appearing.   HEENT: Normal conjunctiva. No scleral icterus. Left EAC and TM normal. Right EAC with small  amount of cerumen, following removal - TM visualized and normal, no notable foreign body in EAC, EAC mildly edematous with white debris present, canal still patent. MMM.   NECK: Supple.  PULMONARY: Breathing comfortable at rest.   NEURO: Alert and oriented. Non-focal.   MUSCULOSKELETAL: Normal range of motion.   SKIN: Skin is warm and dry. No rashes. No pallor or jaundice.   PSYCH: Normal affect and mood.     Emergency Department Course   Emergency Department Course:  Nursing notes and vitals reviewed.  I performed an exam of the patient as documented above.     I discussed the treatment plan with the patient. They expressed understanding of this plan and consented to discharge.    Impression & Plan      Medical Decision Making:  Michael Esteban is a 52 year old male who presents for evaluation of ear discomfort on the right side. He believed a bug may have crawled into his ear or some type of brush as he was working in the yard earlier today. On examination, he did have a small amount of cerumen, no impaction and this was successfully irrigated out. I used a curette to remove some remaining cerumen as well. Following this, there was no evidence of foreign body in the ear, though he did have signs consistent with otitis externa. This may possibly be related to something getting in his ear (though no foreign body on exam) versus his use of q tips. Will place him on antibiotic drops and have him follow up with ENT if not improving in the next 3 days. Contact information provided. Return if increasing pain, fever, decrease in hearing or ear discharge or any other new concerns. The patient was in agreement with plan and discharged in satisfactory condition with all questions answered.      Diagnosis:    ICD-10-CM    1. Acute otitis externa of right ear, unspecified type H60.501      Disposition:   Discharge     Discharge Medications:  New Prescriptions    NEOMYCIN-POLYMYXIN-HYDROCORTISONE (CORTISPORIN) 3.5-56384-1  OTIC SUSPENSION    Place 4 drops into the right ear 4 times daily for 7 days       Scribe Disclosure:  I, Miriamsolange Pelaez, am serving as a scribe at 7:14 PM on 10/7/2017 to document services personally performed by Kandi Suarez PA-C, based on my observations and the provider's statements to me.    10/7/2017   [facility] EMERGENCY DEPARTMENT       Kandi Suarez PA-C  10/07/17 [id number]

## 2017-10-08 NOTE — ED NOTES
Patient was outside working on trees today, and states he has something crawling around in his right ear.

## 2017-11-03 DIAGNOSIS — I10 HYPERTENSION GOAL BP (BLOOD PRESSURE) < 140/90: ICD-10-CM

## 2017-11-03 DIAGNOSIS — I71.20 THORACIC AORTIC ANEURYSM WITHOUT RUPTURE (H): ICD-10-CM

## 2017-11-03 RX ORDER — CARVEDILOL 25 MG/1
TABLET ORAL
Qty: 180 TABLET | Refills: 0 | Status: SHIPPED | OUTPATIENT
Start: 2017-11-03 | End: 2018-08-05

## 2017-11-03 RX ORDER — ATORVASTATIN CALCIUM 40 MG/1
TABLET, FILM COATED ORAL
Qty: 90 TABLET | Refills: 0 | Status: SHIPPED | OUTPATIENT
Start: 2017-11-03 | End: 2017-11-05

## 2017-11-03 NOTE — TELEPHONE ENCOUNTER
Requested Prescriptions   Pending Prescriptions Disp Refills     carvedilol (COREG) 25 MG tablet [Pharmacy Med Name: Carvedilol Oral Tablet 25 MG] 180 tablet 0     Sig: TAKE ONE TABLET BY MOUTH TWICE DAILY with meals    Beta-Blockers Protocol Failed    11/3/2017  7:00 AM       Failed - Blood pressure under 140/90    BP Readings from Last 3 Encounters:   10/07/17 150/83   05/08/17 130/82   04/05/17 116/72                Passed - Patient is age 6 or older       Passed - Recent or future visit with authorizing provider's specialty    Patient had office visit in the last year or has a visit in the next 30 days with authorizing provider.  See chart review.               atorvastatin (LIPITOR) 40 MG tablet [Pharmacy Med Name: Atorvastatin Calcium Oral Tablet 40 MG] 90 tablet 0     Sig: TAKE ONE TABLET BY MOUTH AT BEDTIME for cholesterol    Statins Protocol Passed    11/3/2017  7:00 AM       Passed - LDL on file in past 12 months    Recent Labs   Lab Test  05/08/17   1136   LDL  31            Passed - No abnormal creatine kinase in past 12 months    No lab results found.         Passed - Recent or future visit with authorizing provider    Patient had office visit in the last year or has a visit in the next 30 days with authorizing provider.  See chart review.              Passed - Patient is age 18 or older          Routing refill request to provider for review/approval because:  Labs out of range:  BP      ADE Ramos, RN, PHN  [facility]) [phone]

## 2017-11-03 NOTE — TELEPHONE ENCOUNTER
done x 1 month only. Pt needs recheck office visit before more refills. Please inform pt and  Please assist pt in making appt to be seen. recheck fasting lipids, ast , alt (liver function tests)  in the next month - Please  enter future orders for this and pt  can do this as a lab only appointment.     Also Recheck your blood pressure in our pharmacy in 1 week or sooner if needed.  Have pharmacy send me their note.  No further refills without being seen. Please assist pt in making appt to be seen for office visit as well.

## 2017-11-05 DIAGNOSIS — I71.20 THORACIC AORTIC ANEURYSM WITHOUT RUPTURE (H): ICD-10-CM

## 2017-11-06 RX ORDER — ATORVASTATIN CALCIUM 40 MG/1
TABLET, FILM COATED ORAL
Qty: 90 TABLET | Refills: 1 | Status: SHIPPED | OUTPATIENT
Start: 2017-11-06 | End: 2018-11-15

## 2017-11-06 NOTE — TELEPHONE ENCOUNTER
atorvastatin (LIPITOR) 40 MG tablet 90 tablet 0 11/3/2017  No   Sig: TAKE ONE TABLET BY MOUTH AT BEDTIME for cholesterol     Last Office Visit with G, P or [facility] prescribing provider: 5/8/2017       Lab Results   Component Value Date    CHOL 76 05/08/2017     Lab Results   Component Value Date    HDL 30 05/08/2017     Lab Results   Component Value Date    LDL 31 05/08/2017     Lab Results   Component Value Date    TRIG 73 05/08/2017     Lab Results   Component Value Date    CHOLHDLRATIO 4.0 01/08/2015     Refill per RN protocol     Sol Crane RN, BSN  [facility]

## 2017-11-06 NOTE — TELEPHONE ENCOUNTER
Prescription faxed to [facility].  Patient notified and states he will call back to schedule.    Inge Diego

## 2018-01-15 ENCOUNTER — OFFICE VISIT (OUTPATIENT)
Dept: FAMILY MEDICINE | Facility: CLINIC | Age: 53
End: 2018-01-15
Payer: COMMERCIAL

## 2018-01-15 VITALS
DIASTOLIC BLOOD PRESSURE: 100 MMHG | BODY MASS INDEX: 44.1 KG/M2 | HEART RATE: 76 BPM | TEMPERATURE: 96.8 F | OXYGEN SATURATION: 98 % | HEIGHT: 71 IN | WEIGHT: 315 LBS | SYSTOLIC BLOOD PRESSURE: 146 MMHG

## 2018-01-15 DIAGNOSIS — N52.9 VASCULOGENIC ERECTILE DYSFUNCTION, UNSPECIFIED VASCULOGENIC ERECTILE DYSFUNCTION TYPE: ICD-10-CM

## 2018-01-15 DIAGNOSIS — F17.200 TOBACCO USE DISORDER: ICD-10-CM

## 2018-01-15 DIAGNOSIS — I71.20 THORACIC AORTIC ANEURYSM WITHOUT RUPTURE (H): ICD-10-CM

## 2018-01-15 DIAGNOSIS — Z12.5 SCREENING FOR PROSTATE CANCER: ICD-10-CM

## 2018-01-15 DIAGNOSIS — F10.10 ALCOHOL ABUSE: ICD-10-CM

## 2018-01-15 DIAGNOSIS — E03.9 HYPOTHYROIDISM, UNSPECIFIED TYPE: ICD-10-CM

## 2018-01-15 DIAGNOSIS — I10 ESSENTIAL HYPERTENSION WITH GOAL BLOOD PRESSURE LESS THAN 140/90: Primary | ICD-10-CM

## 2018-01-15 DIAGNOSIS — Z23 NEED FOR PROPHYLACTIC VACCINATION AND INOCULATION AGAINST INFLUENZA: ICD-10-CM

## 2018-01-15 DIAGNOSIS — E66.01 MORBID (SEVERE) OBESITY DUE TO EXCESS CALORIES (H): ICD-10-CM

## 2018-01-15 DIAGNOSIS — Z12.11 SCREEN FOR COLON CANCER: ICD-10-CM

## 2018-01-15 DIAGNOSIS — M1A.09X0 CHRONIC GOUT OF MULTIPLE SITES, UNSPECIFIED CAUSE: ICD-10-CM

## 2018-01-15 DIAGNOSIS — Z13.6 CARDIOVASCULAR SCREENING; LDL GOAL LESS THAN 100: ICD-10-CM

## 2018-01-15 DIAGNOSIS — Z52.4 KIDNEY DONOR: ICD-10-CM

## 2018-01-15 LAB
ERYTHROCYTE [DISTWIDTH] IN BLOOD BY AUTOMATED COUNT: 12.9 % (ref 10–15)
ERYTHROCYTE [SEDIMENTATION RATE] IN BLOOD BY WESTERGREN METHOD: 5 MM/H (ref 0–20)
HCT VFR BLD AUTO: 44.9 % (ref 40–53)
HGB BLD-MCNC: 14.5 G/DL (ref 13.3–17.7)
MCH RBC QN AUTO: 29.8 PG (ref 26.5–33)
MCHC RBC AUTO-ENTMCNC: 32.3 G/DL (ref 31.5–36.5)
MCV RBC AUTO: 92 FL (ref 78–100)
PLATELET # BLD AUTO: 209 10E9/L (ref 150–450)
RBC # BLD AUTO: 4.86 10E12/L (ref 4.4–5.9)
WBC # BLD AUTO: 10.4 10E9/L (ref 4–11)

## 2018-01-15 PROCEDURE — G0103 PSA SCREENING: HCPCS | Performed by: FAMILY MEDICINE

## 2018-01-15 PROCEDURE — 36415 COLL VENOUS BLD VENIPUNCTURE: CPT | Performed by: FAMILY MEDICINE

## 2018-01-15 PROCEDURE — 80053 COMPREHEN METABOLIC PANEL: CPT | Performed by: FAMILY MEDICINE

## 2018-01-15 PROCEDURE — 85027 COMPLETE CBC AUTOMATED: CPT | Performed by: FAMILY MEDICINE

## 2018-01-15 PROCEDURE — 84439 ASSAY OF FREE THYROXINE: CPT | Performed by: FAMILY MEDICINE

## 2018-01-15 PROCEDURE — 84550 ASSAY OF BLOOD/URIC ACID: CPT | Performed by: FAMILY MEDICINE

## 2018-01-15 PROCEDURE — 85652 RBC SED RATE AUTOMATED: CPT | Performed by: FAMILY MEDICINE

## 2018-01-15 PROCEDURE — 84480 ASSAY TRIIODOTHYRONINE (T3): CPT | Performed by: FAMILY MEDICINE

## 2018-01-15 PROCEDURE — 86140 C-REACTIVE PROTEIN: CPT | Performed by: FAMILY MEDICINE

## 2018-01-15 PROCEDURE — 84443 ASSAY THYROID STIM HORMONE: CPT | Performed by: FAMILY MEDICINE

## 2018-01-15 PROCEDURE — 99214 OFFICE O/P EST MOD 30 MIN: CPT | Mod: 25 | Performed by: FAMILY MEDICINE

## 2018-01-15 PROCEDURE — 90471 IMMUNIZATION ADMIN: CPT | Performed by: FAMILY MEDICINE

## 2018-01-15 PROCEDURE — 90686 IIV4 VACC NO PRSV 0.5 ML IM: CPT | Performed by: FAMILY MEDICINE

## 2018-01-15 RX ORDER — SILDENAFIL 50 MG/1
50 TABLET, FILM COATED ORAL DAILY PRN
Qty: 12 TABLET | Refills: 1 | Status: SHIPPED | OUTPATIENT
Start: 2018-01-15 | End: 2018-11-15

## 2018-01-15 RX ORDER — LISINOPRIL 20 MG/1
20 TABLET ORAL DAILY
Qty: 90 TABLET | Refills: 0 | Status: SHIPPED | OUTPATIENT
Start: 2018-01-15 | End: 2018-08-05

## 2018-01-15 NOTE — LETTER
[facility]  [address] [place] [address]                  [phone]   January 19, 2018    Michael Esteban  [address] [facility] [address]      Dear Michael,    Here is a summary of your recent test results:    Liver and gallbladder tests are normal. (ALT,AST, Alk phos, bilirubin), kidney function is a little elevated again  (Cr, GFR), Sodium is normal, Potassium is normal, Calcium is normal, Glucose is normal (diabetes screening test).   -PSA (prostate specific antigen) test is normal.  This indicates a low likelihood of prostate cancer.  ADVISE: yearly recheck.   -TSH (thyroid stimulating hormone) level is normal which indicates normal thyroid function.   -Normal red blood cell (hgb) levels, normal white blood cell count and normal platelet levels.   C-reactive protein and ESR  - sed rate -  generalized indicators of inflammation in the body - were  normal/negative.   Uric acid level= normal.     Please continue your current meds and increase your weight loss efforts for your overall health   This will help your kidneys as well                  Your test results are enclosed.      Please contact me if you have any questions.    In addition, here is a list of due or overdue Health Maintenance reminders.    Health Maintenance Due   Topic Date Due     Colon Cancer Screening - FIT Test - yearly  01/24/2018       Please call us at [phone] (or use Tyba) to address the above recommendations.            Thank you very much for trusting [facility]..     Healthy regards,       Laura Austin M.D.          Results for orders placed or performed in visit on 01/15/18   CBC with platelets   Result Value Ref Range    WBC 10.4 4.0 - 11.0 10e9/L    RBC Count 4.86 4.4 - 5.9 10e12/L    Hemoglobin 14.5 13.3 - 17.7 g/dL    Hematocrit 44.9 40.0 - 53.0 %    MCV 92 78 - 100 fl    MCH 29.8 26.5 - 33.0 pg    MCHC 32.3 31.5 - 36.5  g/dL    RDW 12.9 10.0 - 15.0 %    Platelet Count 209 150 - 450 10e9/L   Comprehensive metabolic panel   Result Value Ref Range    Sodium 143 133 - 144 mmol/L    Potassium 5.0 3.4 - 5.3 mmol/L    Chloride 108 94 - 109 mmol/L    Carbon Dioxide 29 20 - 32 mmol/L    Anion Gap 6 3 - 14 mmol/L    Glucose 87 70 - 99 mg/dL    Urea Nitrogen 18 7 - 30 mg/dL    Creatinine 1.35 (H) 0.66 - 1.25 mg/dL    GFR Estimate 55 (L) >60 mL/min/1.7m2    GFR Estimate If Black 67 >60 mL/min/1.7m2    Calcium 8.9 8.5 - 10.1 mg/dL    Bilirubin Total 0.3 0.2 - 1.3 mg/dL    Albumin 3.9 3.4 - 5.0 g/dL    Protein Total 7.0 6.8 - 8.8 g/dL    Alkaline Phosphatase 56 40 - 150 U/L    ALT 40 0 - 70 U/L    AST 25 0 - 45 U/L   TSH   Result Value Ref Range    TSH 1.90 0.40 - 4.00 mU/L   T4 FREE   Result Value Ref Range    T4 Free 0.93 0.76 - 1.46 ng/dL   T3 total   Result Value Ref Range    Triiodothyronine (T3) 85 60 - 181 ng/dL   CRP inflammation   Result Value Ref Range    CRP Inflammation <2.9 0.0 - 8.0 mg/L   Erythrocyte sedimentation rate auto   Result Value Ref Range    Sed Rate 5 0 - 20 mm/h   Uric acid   Result Value Ref Range    Uric Acid 6.4 3.5 - 7.2 mg/dL   Prostate spec antigen screen   Result Value Ref Range    PSA 1.43 0 - 4 ug/L

## 2018-01-15 NOTE — PATIENT INSTRUCTIONS
Increase lisinopril to 20mg daily.    Recheck your blood pressure in our pharmacy in 1 week or sooner if needed.  Have pharmacy send me their note.    Cut down again on beer /alcohol  to 3 beers/night max and 3 times /week, if that. If you can eliminate alcohol together, that would be great.     We've entered future orders for your fasting lipids  and you can do this as a lab only appointment in the next month.     See Patient Instructions.     Recheck with me again in 6 months or sooner, if needed.     Please work on your weight loss as well.                 Thank you for choosing [facility]  for your Health Care. It was a pleasure seeing you at your visit today. Please contact us with any questions or concerns you may have.                   Laura Austin MD                                  To reach your [facility] care team after hours call:   [phone]    Our clinic hours are:     Monday- 7:30 am - 7:00 pm                             Tuesday through Friday- 7:30 am - 5:00 pm                                        Saturday- 8:00 am - 12:00 pm                  Phone:  [phone]    Our pharmacy hours are:     Monday  8:00 am to 7:00 pm      Tuesday through Friday 8:00am to 6:00pm                        Saturday - 9:00 am to 1:00 pm      Sunday : Closed.              Phone:  [phone]      There is also information available at our web site:  [email or web address]    If your provider ordered any lab tests and you do not receive the results within 10 business days, please call the clinic.    If you need a medication refill please contact your pharmacy.  Please allow 2 business days for your refill to be completed.    Our clinic offers telephone visits and e visits.  Please ask one of your team members to explain more.      Use [employer] (secure email communication and access to your chart) to send your primary care provider a message or make an appointment. Ask someone  on your Team how to sign up for MyChart.

## 2018-01-15 NOTE — NURSING NOTE
"Chief Complaint   Patient presents with     Recheck Medication       Initial /87 (BP Location: Left arm, Patient Position: Chair, Cuff Size: Adult Large)  Pulse 76  Temp 96.8  F (36  C) (Tympanic)  Ht 5' 11\" (1.803 m)  Wt (!) 350 lb 3.2 oz (158.8 kg)  SpO2 98%  BMI 48.84 kg/m2 Estimated body mass index is 48.84 kg/(m^2) as calculated from the following:    Height as of this encounter: 5' 11\" (1.803 m).    Weight as of this encounter: 350 lb 3.2 oz (158.8 kg).  Medication Reconciliation: complete     Natasha Resendiz MA     "

## 2018-01-15 NOTE — MR AVS SNAPSHOT
After Visit Summary   1/15/2018    Michael Esteban    MRN: [id number]           Patient Information     Date Of Birth          1965        Visit Information        Provider Department      1/15/2018 4:00 PM Laura Austin MD [facility]        Today's Diagnoses     Essential hypertension with goal blood pressure less than 140/90- not well controlled today     -  1    Vasculogenic erectile dysfunction, unspecified vasculogenic erectile dysfunction type        history of Thoracic aortic aneurysm without rupture - 4.7 cm on 9/8/2014 increased to 5.2 cm 1/2015 - repaired with gortex graft 2/17/2015         Screen for colon cancer        Need for prophylactic vaccination and inoculation against influenza        CARDIOVASCULAR SCREENING; LDL GOAL LESS THAN 100- secondary to hx of TAA repair        Kidney donor        Hypothyroidism, unspecified type        Tobacco use disorder- pipe/cigar -started smoking cigars/pipe again 6/2015        Alcohol abuse - cut down  s/p TAA repair 2/27/2015 - down to 2-3 beers/week        Chronic gout of multiple sites, unspecified cause        Screening for prostate cancer          Care Instructions    Increase lisinopril to 20mg daily.    Recheck your blood pressure in our pharmacy in 1 week or sooner if needed.  Have pharmacy send me their note.    Cut down again on beer /alcohol  to 3 beers/night max and 3 times /week, if that. If you can eliminate alcohol together, that would be great.     We've entered future orders for your fasting lipids  and you can do this as a lab only appointment in the next month.     See Patient Instructions.     Recheck with me again in 6 months or sooner, if needed.     Please work on your weight loss as well.                 Thank you for choosing [facility]  for your Health Care. It was a pleasure seeing you at your visit today. Please contact us with any questions or concerns you may have.                    Laura Austin MD                                  To reach your [facility] care team after hours call:   [phone]    Our clinic hours are:     Monday- 7:30 am - 7:00 pm                             Tuesday through Friday- 7:30 am - 5:00 pm                                        Saturday- 8:00 am - 12:00 pm                  Phone:  [phone]    Our pharmacy hours are:     Monday  8:00 am to 7:00 pm      Tuesday through Friday 8:00am to 6:00pm                        Saturday - 9:00 am to 1:00 pm      Sunday : Closed.              Phone:  [phone]      There is also information available at our web site:  [email or web address]    If your provider ordered any lab tests and you do not receive the results within 10 business days, please call the clinic.    If you need a medication refill please contact your pharmacy.  Please allow 2 business days for your refill to be completed.    Our clinic offers telephone visits and e visits.  Please ask one of your team members to explain more.      Use [employer] (secure email communication and access to your chart) to send your primary care provider a message or make an appointment. Ask someone on your Team how to sign up for [employer] S[employer].                       Follow-ups after your visit        Future tests that were ordered for you today     Open Future Orders        Priority Expected Expires Ordered    Lipid panel reflex to direct LDL Fasting Routine 1/27/2018 1/15/2019 1/15/2018            Who to contact     If you have questions or need follow up information about today's clinic visit or your schedule please contact [facility] directly at [phone].  Normal or non-critical lab and imaging results will be communicated to you by MyChart, letter or phone within 4 business days after the clinic has received the results. If you do not hear from us within 7 days, please contact the clinic through Sonicohart or phone. If you  "have a critical or abnormal lab result, we will notify you by phone as soon as possible.  Submit refill requests through [employer] or call your pharmacy and they will forward the refill request to us. Please allow 3 business days for your refill to be completed.          Additional Information About Your Visit        miiCardhart Information     [employer] lets you send messages to your doctor, view your test results, renew your prescriptions, schedule appointments and more. To sign up, go to [email or web address] . Click on \"Log in\" on the left side of the screen, which will take you to the Welcome page. Then click on \"Sign up Now\" on the right side of the page.     You will be asked to enter the access code listed below, as well as some personal information. Please follow the directions to create your username and password.     Your access code is: [email or web address]  Expires: 4/15/2018  5:16 PM     Your access code will  in 90 days. If you need help or a new code, please call your [facility] clinic or [phone].        Care EveryWhere ID     This is your Care EveryWhere ID. This could be used by other organizations to access your [facility] medical records  [id number]        Your Vitals Were     Pulse Temperature Height Pulse Oximetry BMI (Body Mass Index)       76 96.8  F (36  C) (Tympanic) 5' 11\" (1.803 m) 98% 48.84 kg/m2        Blood Pressure from Last 3 Encounters:   01/15/18 (!) 146/100   10/07/17 150/83   17 130/82    Weight from Last 3 Encounters:   01/15/18 (!) 350 lb 3.2 oz (158.8 kg)   17 (!) 343 lb 3.2 oz (155.7 kg)   17 (!) 354 lb 3.2 oz (160.7 kg)              We Performed the Following          ADMIN VACCINE, FIRST [59589]     Albumin Random Urine Quantitative with Creat Ratio     CBC with platelets     Comprehensive metabolic panel     CRP inflammation     Erythrocyte sedimentation rate auto     HC FLU VAC PRESRV FREE QUAD SPLIT VIR 3+YRS IM  [62902]     Prostate spec antigen screen     " T3 total     T4 FREE     TSH     UA with Microscopic     Uric acid          Today's Medication Changes          These changes are accurate as of: 1/15/18  5:16 PM.  If you have any questions, ask your nurse or doctor.               Start taking these medicines.        Dose/Directions    sildenafil 50 MG tablet   Commonly known as:  VIAGRA   Used for:  Vasculogenic erectile dysfunction, unspecified vasculogenic erectile dysfunction type   Started by:  Laura Austin MD        Dose:  50 mg   Take 1 tablet (50 mg) by mouth daily as needed 30 min to 4 hrs before sex. Do not use with nitroglycerin, terazosin or doxazosin.   Quantity:  12 tablet   Refills:  1         These medicines have changed or have updated prescriptions.        Dose/Directions    lisinopril 20 MG tablet   Commonly known as:  PRINIVIL/ZESTRIL   This may have changed:    - medication strength  - how much to take  - how to take this  - when to take this  - additional instructions   Used for:  Essential hypertension with goal blood pressure less than 140/90   Changed by:  Laura Austin MD        Dose:  20 mg   Take 1 tablet (20 mg) by mouth daily   Quantity:  90 tablet   Refills:  0            Where to get your medicines      These medications were sent to [id number] [address] [address] [address]     Phone:  [phone]     lisinopril 20 MG tablet         Some of these will need a paper prescription and others can be bought over the counter.  Ask your nurse if you have questions.     Bring a paper prescription for each of these medications     sildenafil 50 MG tablet                Primary Care Provider Office Phone # Fax #    Laura Austin [phone]       [address] [place] [address]        Equal Access to Services     JEREMIE NEWSOME AH: Faye Hollingsworth, joshua deleon, sabas vogel  kayla enriqueaaana ah. So [facility] [phone].    ATENCIÓN: Si roberto carlsola tony, tiene a leigh disposición servicios gratuitos de asistencia lingüística. Wilfrido hidalgo [phone].    We comply with applicable federal civil rights laws and [place] laws. We do not discriminate on the basis of race, color, national origin, age, disability, sex, sexual orientation, or gender identity.            Thank you!     Thank you for choosing [facility]  for your care. Our goal is always to provide you with excellent care. Hearing back from our patients is one way we can continue to improve our services. Please take a few minutes to complete the written survey that you may receive in the mail after your visit with us. Thank you!             Your Updated Medication List - Protect others around you: Learn how to safely use, store and throw away your medicines at www.disposemymeds.org.          This list is accurate as of: 1/15/18  5:16 PM.  Always use your most recent med list.                   Brand Name Dispense Instructions for use Diagnosis    allopurinol 100 MG tablet    ZYLOPRIM    90 tablet    Take 1 tablet (100 mg) by mouth daily    Acute gout of left foot, unspecified cause       aspirin 81 MG chewable tablet     90 tablet    Take 1 tablet (81 mg) by mouth daily    Aortic aneurysm (H)       atorvastatin 40 MG tablet    LIPITOR    90 tablet    TAKE 1 TABLET (40 MG) BY MOUTH AT BEDTIME FOR CHOLESTEROL    Thoracic aortic aneurysm without rupture (H)       carvedilol 25 MG tablet    COREG    180 tablet    TAKE ONE TABLET BY MOUTH TWICE DAILY with meals    Hypertension goal BP (blood pressure) < 140/90       levothyroxine 75 MCG tablet    SYNTHROID/LEVOTHROID    90 tablet    TAKE 1 TABLET BY MOUTH ONCE DAILY    Hypothyroid       lisinopril 20 MG tablet    PRINIVIL/ZESTRIL    90 tablet    Take 1 tablet (20 mg) by mouth daily    Essential hypertension with goal blood pressure less than 140/90       order for DME       Equipment being ordered: RESMED AIRSENSE 10 WITH HH AND RESMED AIRFIT P10 PILLOW MASK.        oxyCODONE IR 5 MG tablet    ROXICODONE    60 tablet    Take 1-2 tablets (5-10 mg) by mouth every 3 hours as needed for moderate to severe pain    Thoracic aortic aneurysm without rupture (H)       sildenafil 50 MG tablet    VIAGRA    12 tablet    Take 1 tablet (50 mg) by mouth daily as needed 30 min to 4 hrs before sex. Do not use with nitroglycerin, terazosin or doxazosin.    Vasculogenic erectile dysfunction, unspecified vasculogenic erectile dysfunction type       TYLENOL 500 MG tablet   Generic drug:  acetaminophen      Take 500 mg by mouth every 6 hours as needed for mild pain

## 2018-01-16 LAB
ALBUMIN SERPL-MCNC: 3.9 G/DL (ref 3.4–5)
ALP SERPL-CCNC: 56 U/L (ref 40–150)
ALT SERPL W P-5'-P-CCNC: 40 U/L (ref 0–70)
ANION GAP SERPL CALCULATED.3IONS-SCNC: 6 MMOL/L (ref 3–14)
AST SERPL W P-5'-P-CCNC: 25 U/L (ref 0–45)
BILIRUB SERPL-MCNC: 0.3 MG/DL (ref 0.2–1.3)
BUN SERPL-MCNC: 18 MG/DL (ref 7–30)
CALCIUM SERPL-MCNC: 8.9 MG/DL (ref 8.5–10.1)
CHLORIDE SERPL-SCNC: 108 MMOL/L (ref 94–109)
CO2 SERPL-SCNC: 29 MMOL/L (ref 20–32)
CREAT SERPL-MCNC: 1.35 MG/DL (ref 0.66–1.25)
CRP SERPL-MCNC: <2.9 MG/L (ref 0–8)
GFR SERPL CREATININE-BSD FRML MDRD: 55 ML/MIN/1.7M2
GLUCOSE SERPL-MCNC: 87 MG/DL (ref 70–99)
POTASSIUM SERPL-SCNC: 5 MMOL/L (ref 3.4–5.3)
PROT SERPL-MCNC: 7 G/DL (ref 6.8–8.8)
PSA SERPL-ACNC: 1.43 UG/L (ref 0–4)
SODIUM SERPL-SCNC: 143 MMOL/L (ref 133–144)
T3 SERPL-MCNC: 85 NG/DL (ref 60–181)
T4 FREE SERPL-MCNC: 0.93 NG/DL (ref 0.76–1.46)
TSH SERPL DL<=0.005 MIU/L-ACNC: 1.9 MU/L (ref 0.4–4)
URATE SERPL-MCNC: 6.4 MG/DL (ref 3.5–7.2)

## 2018-01-21 DIAGNOSIS — E03.9 HYPOTHYROID: ICD-10-CM

## 2018-01-22 RX ORDER — LEVOTHYROXINE SODIUM 75 UG/1
TABLET ORAL
Qty: 30 TABLET | Refills: 6 | Status: SHIPPED | OUTPATIENT
Start: 2018-01-22 | End: 2018-10-03

## 2018-01-22 NOTE — TELEPHONE ENCOUNTER
"Requested Prescriptions   Pending Prescriptions Disp Refills     levothyroxine (SYNTHROID/LEVOTHROID) 75 MCG tablet [Pharmacy Med Name: Levothyroxine Sodium Oral Tablet 75 MCG] 30 tablet 0    Last Written Prescription Date:  9.13.17  Last Fill Quantity: 90 tablet,  # refills: 0   Last Office Visit with [facility] or [facility] prescribing provider:  1.15.18   Future Office Visit:      Sig: TAKE 1 TABLET BY MOUTH ONCE DAILY    Thyroid Protocol Passed    1/21/2018  7:00 AM       Passed - Patient is 12 years or older       Passed - Recent or future visit with authorizing provider's specialty    Patient had office visit in the last year or has a visit in the next 30 days with authorizing provider.  See \"Patient Info\" tab in inbasket, or \"Choose Columns\" in Meds & Orders section of the refill encounter.            Passed - Normal TSH on file in past 12 months    Recent Labs   Lab Test  01/15/18   1717   TSH  1.90                "

## 2018-01-22 NOTE — TELEPHONE ENCOUNTER
Prescription approved per [facility] Refill Protocol.  Tameka Tang RN  [facility]

## 2018-01-24 ENCOUNTER — TELEPHONE (OUTPATIENT)
Dept: FAMILY MEDICINE | Facility: CLINIC | Age: 53
End: 2018-01-24

## 2018-01-24 NOTE — TELEPHONE ENCOUNTER
Prior Authorization Retail Medication Request  Medication/Dose: Sidenafil Citrate 50mg   Diagnosis and ICD code: Vasculogenic erectile dysfunction, unspecified vasculogenic erectile dysfunction type [N52.9]  New/Renewal/Insurance Change PA: New  Previously Tried and Failed Therapies: none    Insurance ID (if provided): [id number]  Insurance Phone (if provided): [phone]    Any additional info from fax request:     If you received a fax notification from an outside Pharmacy:  Pharmacy Name:[facility] [place]  Pharmacy #:[phone]  Pharmacy Fax:[phone]

## 2018-01-25 NOTE — TELEPHONE ENCOUNTER
Central Prior Authorization Team   Phone: [phone]    PA Initiation    Medication: Sidenafil Citrate 50mg   Insurance Company: [employer] - Phone [phone] Fax [phone]  Pharmacy Filling the Rx: [id number] - [place], [place] - [address] [address]  Filling Pharmacy Phone: [phone]  Filling Pharmacy Fax: [phone]  Start Date: 1/24/2018

## 2018-01-26 NOTE — TELEPHONE ENCOUNTER
PA denied.  If provider would like to appeal please provide a letter of medical necessity and route back to PA team.  Otherwise, please close encounter when finished.   Thanks   Brandy (Routing comment)     Provider, please advise on appeal, change in medication or give pt options to Parents R People assistance/"Hey, Neighbor!" discount.  Trinity Thomas

## 2018-01-26 NOTE — TELEPHONE ENCOUNTER
PRIOR AUTHORIZATION DENIED    Medication: Sidenafil Citrate 50mg     Denial Date: 1/25/2018    Denial Rational:  Medication not covered by plan        Appeal Information:  Call [phone]

## 2018-01-27 NOTE — TELEPHONE ENCOUNTER
Looks like all meds to treat erectile dysfunction are NOT covered by this pt's plan, so prior auth wouldn't help.  Please give pt options to copay assistance/[employer] discount.

## 2018-05-07 ENCOUNTER — TRANSFERRED RECORDS (OUTPATIENT)
Dept: HEALTH INFORMATION MANAGEMENT | Facility: CLINIC | Age: 53
End: 2018-05-07

## 2018-05-07 DIAGNOSIS — G47.33 OSA (OBSTRUCTIVE SLEEP APNEA): Primary | ICD-10-CM

## 2018-05-07 LAB
ALT SERPL-CCNC: 28 IU/L (ref 5–40)
AST SERPL-CCNC: 18 U/L (ref 5–34)
CREAT SERPL-MCNC: 1.05 MG/DL (ref 0.5–1.3)
GFR SERPL CREATININE-BSD FRML MDRD: 78.5 ML/MIN/1.73M2

## 2018-07-11 DIAGNOSIS — M10.9 ACUTE GOUT OF LEFT FOOT, UNSPECIFIED CAUSE: ICD-10-CM

## 2018-07-11 RX ORDER — ALLOPURINOL 100 MG/1
TABLET ORAL
Qty: 90 TABLET | Refills: 1 | Status: SHIPPED | OUTPATIENT
Start: 2018-07-11 | End: 2018-11-07

## 2018-07-11 NOTE — TELEPHONE ENCOUNTER
Noted 1/15/18 level said it was normal on result notes.    Prescription approved per [facility] Refill Protocol.  Tameka Tang RN  [facility]

## 2018-07-11 NOTE — TELEPHONE ENCOUNTER
"Requested Prescriptions   Pending Prescriptions Disp Refills     allopurinol (ZYLOPRIM) 100 MG tablet [Pharmacy Med Name: Allopurinol Oral Tablet 100 MG]  Last Written Prescription Date:  5/8/17  Last Fill Quantity: 90,  # refills: 0   Last office visit: 1/15/2018 with prescribing provider:  Norman   Future Office Visit:     90 tablet 2     Sig: TAKE ONE TABLET BY MOUTH ONE TIME DAILY    Gout Agents Protocol Failed    7/11/2018  7:00 AM       Failed - Has Uric Acid on file in past 12 months and value is less than 6    Recent Labs   Lab Test  01/15/18   1717   URIC  6.4     If level is 6mg/dL or greater, ok to refill one time and refer to provider.          Passed - CBC on file in past 12 months    Recent Labs   Lab Test  01/15/18   1717   WBC  10.4   RBC  4.86   HGB  14.5   HCT  44.9   PLT  209       For [facility] ONLY: NVAM146 = WBC, BSED555 = RBC         Passed - ALT on file in past 12 months    Recent Labs   Lab Test 05/07/18   ALT  28          Passed - Recent (12 mo) or future (30 days) visit within the authorizing provider's specialty    Patient had office visit in the last 12 months or has a visit in the next 30 days with authorizing provider or within the authorizing provider's specialty.  See \"Patient Info\" tab in inbasket, or \"Choose Columns\" in Meds & Orders section of the refill encounter.         Passed - Patient is age 18 or older       Passed - Normal serum creatinine on file in the past 12 months    Recent Labs   Lab Test 05/07/18   CR  1.050             "

## 2018-08-05 DIAGNOSIS — I10 HYPERTENSION GOAL BP (BLOOD PRESSURE) < 140/90: ICD-10-CM

## 2018-08-05 DIAGNOSIS — I10 ESSENTIAL HYPERTENSION WITH GOAL BLOOD PRESSURE LESS THAN 140/90: ICD-10-CM

## 2018-08-05 NOTE — LETTER
[facility]  [address] [place] [address]  [phone]  August 10, 2018    Michael Esteban  [address] [address] [facility] [address]    Dear Michael,    I care about your health and have reviewed your health plan. I have reviewed your medical conditions, medication list, and lab results and am making recommendations based on this review, to better manage your health.    You are in particular need of attention regarding:  -medication check    I am recommending that you:  -schedule a FOLLOWUP OFFICE APPOINTMENT with me.         Here is a list of Health Maintenance topics that are due now or due soon:  Health Maintenance Due   Topic Date Due     HIV SCREEN (SYSTEM ASSIGNED)  05/31/1982     Depression Assessment 2 - yearly  01/06/2017     Colon Cancer Screening - FIT Test - yearly  01/24/2018     Cholesterol Lab - yearly  05/08/2018     Microalbumin Lab - yearly  05/08/2018       Please call us at [phone] (or use [employer]) to address the above recommendations.     Thank you for trusting [facility] and we appreciate the opportunity to serve you.  We look forward to supporting your healthcare needs in the future.    Healthy Regards,         Laura Austin M.D.

## 2018-08-06 NOTE — TELEPHONE ENCOUNTER
"Requested Prescriptions   Pending Prescriptions Disp Refills     carvedilol (COREG) 25 MG tablet [Pharmacy Med Name: Carvedilol Oral Tablet 25 MG]  Last Written Prescription Date:  11-3-2017  Last Fill Quantity: 180 TABLET,  # refills: 0   Last office visit: 1/15/2018 with prescribing provider:     Future Office Visit:     180 tablet 0     Sig: TAKE ONE TABLET BY MOUTH TWICE DAILY with meals    Beta-Blockers Protocol Failed    8/5/2018  4:08 PM       Failed - Blood pressure under 140/90 in past 12 months    BP Readings from Last 3 Encounters:   01/15/18 (!) 146/100   10/07/17 150/83   05/08/17 130/82                Passed - Patient is age 6 or older       Passed - Recent (12 mo) or future (30 days) visit within the authorizing provider's specialty    Patient had office visit in the last 12 months or has a visit in the next 30 days with authorizing provider or within the authorizing provider's specialty.  See \"Patient Info\" tab in inbasket, or \"Choose Columns\" in Meds & Orders section of the refill encounter.                  lisinopril (PRINIVIL/ZESTRIL) 20 MG tablet [Pharmacy Med Name: Lisinopril Oral Tablet 20 MG]  Last Written Prescription Date:  1-  Last Fill Quantity:90 TABLET,  # refills: 0   Last office visit: 1/15/2018 with prescribing provider:     Future Office Visit:     90 tablet 0     Sig: Take 1 tablet (20 mg) by mouth daily    ACE Inhibitors (Including Combos) Protocol Failed    8/5/2018  4:08 PM       Failed - Blood pressure under 140/90 in past 12 months    BP Readings from Last 3 Encounters:   01/15/18 (!) 146/100   10/07/17 150/83   05/08/17 130/82                Passed - Recent (12 mo) or future (30 days) visit within the authorizing provider's specialty    Patient had office visit in the last 12 months or has a visit in the next 30 days with authorizing provider or within the authorizing provider's specialty.  See \"Patient Info\" tab in inbasket, or \"Choose Columns\" in Meds & Orders " section of the refill encounter.           Passed - Patient is age 18 or older       Passed - Normal serum creatinine on file in past 12 months    Recent Labs   Lab Test 05/07/18   CR  1.050            Passed - Normal serum potassium on file in past 12 months    Recent Labs   Lab Test  01/15/18   1717   POTASSIUM  5.0

## 2018-08-07 RX ORDER — CARVEDILOL 25 MG/1
TABLET ORAL
Qty: 180 TABLET | Refills: 0 | Status: SHIPPED | OUTPATIENT
Start: 2018-08-07 | End: 2018-11-02

## 2018-08-07 RX ORDER — LISINOPRIL 20 MG/1
TABLET ORAL
Qty: 90 TABLET | Refills: 0 | Status: SHIPPED | OUTPATIENT
Start: 2018-08-07 | End: 2018-11-02

## 2018-08-07 NOTE — TELEPHONE ENCOUNTER
Routing refill request to provider for review/approval because:  bp out of range    Ashley Dumont,RN BSN  [facility]  [phone]

## 2018-08-08 NOTE — TELEPHONE ENCOUNTER
Left non-detailed message for patient to call back.  Please schedule follow up when patient calls back.  (see previous notes for details)    Inge Diego

## 2018-08-10 NOTE — TELEPHONE ENCOUNTER
Second attempt - Left non-detailed message for patient to call back.  Please schedule follow up when patient calls back.  (see previous notes for details)  Letter sent.  Closing encounter.    Inge Diego

## 2018-10-03 DIAGNOSIS — E03.9 HYPOTHYROID: ICD-10-CM

## 2018-10-03 RX ORDER — LEVOTHYROXINE SODIUM 75 UG/1
TABLET ORAL
Qty: 30 TABLET | Refills: 1 | Status: SHIPPED | OUTPATIENT
Start: 2018-10-03 | End: 2018-11-15

## 2018-10-03 NOTE — TELEPHONE ENCOUNTER
"Requested Prescriptions   Pending Prescriptions Disp Refills     levothyroxine (SYNTHROID/LEVOTHROID) 75 MCG tablet [Pharmacy Med Name: Levothyroxine Sodium Oral Tablet 75 MCG] 30 tablet 5      Last Written Prescription Date:  1.22.18  Last Fill Quantity: 30,  # refills: 6   Last office visit: 1/15/2018 with prescribing provider:     Future Office Visit:     Sig: TAKE 1 TABLET BY MOUTH ONCE DAILY    Thyroid Protocol Passed    10/3/2018  7:01 AM       Passed - Patient is 12 years or older       Passed - Recent (12 mo) or future (30 days) visit within the authorizing provider's specialty    Patient had office visit in the last 12 months or has a visit in the next 30 days with authorizing provider or within the authorizing provider's specialty.  See \"Patient Info\" tab in inbasket, or \"Choose Columns\" in Meds & Orders section of the refill encounter.           Passed - Normal TSH on file in past 12 months    Recent Labs   Lab Test  01/15/18   1717   TSH  1.90                "

## 2018-10-03 NOTE — TELEPHONE ENCOUNTER
Medication is being filled for 1 time refill only due to:  Patient needs to be seen because due for phx/labs.   Tameka Tang RN  [facility]

## 2018-11-02 DIAGNOSIS — Z52.4 KIDNEY DONOR: ICD-10-CM

## 2018-11-02 DIAGNOSIS — I10 ESSENTIAL HYPERTENSION WITH GOAL BLOOD PRESSURE LESS THAN 140/90: ICD-10-CM

## 2018-11-02 DIAGNOSIS — I71.20 THORACIC AORTIC ANEURYSM WITHOUT RUPTURE (H): Primary | ICD-10-CM

## 2018-11-02 NOTE — TELEPHONE ENCOUNTER
"Requested Prescriptions   Pending Prescriptions Disp Refills     lisinopril (PRINIVIL/ZESTRIL) 20 MG tablet [Pharmacy Med Name: Lisinopril Oral Tablet 20 MG] 90 tablet 0        Last Written Prescription Date:  8.7.18  Last Fill Quantity: 90,  # refills: 0   Last Office Visit: 1/15/2018   Future Office Visit:      Sig: Take 1 tablet (20 mg) by mouth daily    ACE Inhibitors (Including Combos) Protocol Failed    11/2/2018  7:00 AM       Failed - Blood pressure under 140/90 in past 12 months    BP Readings from Last 3 Encounters:   01/15/18 (!) 146/100   10/07/17 150/83   05/08/17 130/82                Passed - Recent (12 mo) or future (30 days) visit within the authorizing provider's specialty    Patient had office visit in the last 12 months or has a visit in the next 30 days with authorizing provider or within the authorizing provider's specialty.  See \"Patient Info\" tab in inbasket, or \"Choose Columns\" in Meds & Orders section of the refill encounter.             Passed - Patient is age 18 or older       Passed - Normal serum creatinine on file in past 12 months    Recent Labs   Lab Test 05/07/18 04/21/17   1445   CR  1.050   < >   --    CREAT   --    --   1.1    < > = values in this interval not displayed.            Passed - Normal serum potassium on file in past 12 months    Recent Labs   Lab Test  01/15/18   1717   POTASSIUM  5.0             carvedilol (COREG) 25 MG tablet [Pharmacy Med Name: Carvedilol Oral Tablet 25 MG] 180 tablet 0    Last Written Prescription Date:  8.7.18  Last Fill Quantity: 180,  # refills: 0   Last Office Visit: 1/15/2018   Future Office Visit:      Sig: TAKE ONE TABLET BY MOUTH TWICE DAILY with meals    Beta-Blockers Protocol Failed    11/2/2018  7:00 AM       Failed - Blood pressure under 140/90 in past 12 months    BP Readings from Last 3 Encounters:   01/15/18 (!) 146/100   10/07/17 150/83   05/08/17 130/82                Passed - Patient is age 6 or older       Passed - Recent (12 " "mo) or future (30 days) visit within the authorizing provider's specialty    Patient had office visit in the last 12 months or has a visit in the next 30 days with authorizing provider or within the authorizing provider's specialty.  See \"Patient Info\" tab in inbasket, or \"Choose Columns\" in Meds & Orders section of the refill encounter.                "

## 2018-11-05 NOTE — TELEPHONE ENCOUNTER
Due for a BP check - with PCP     Attempt # 1    Called #   Telephone Information:   Mobile [phone]         Left a non detailed VM     Sol Crane RN, BSN  [facility]

## 2018-11-05 NOTE — TELEPHONE ENCOUNTER
Pt called back. Doesn't have time to see provider per patient but wants to make RN visit and lab visit only. What time slot can we use for this patient to get his BP check with Dr. Austin since she is booked out. Also patient wants to know if he has to see PCP. Ok to lm.

## 2018-11-05 NOTE — TELEPHONE ENCOUNTER
Patient advised of need for appointment.  Will refill once scheduled.    Joselyn Sanders, BS, RN, N  [facility]) [phone]

## 2018-11-06 RX ORDER — CARVEDILOL 25 MG/1
TABLET ORAL
Qty: 180 TABLET | Refills: 0 | Status: SHIPPED | OUTPATIENT
Start: 2018-11-06 | End: 2018-11-12

## 2018-11-06 RX ORDER — LISINOPRIL 20 MG/1
TABLET ORAL
Qty: 90 TABLET | Refills: 0 | Status: SHIPPED | OUTPATIENT
Start: 2018-11-06 | End: 2018-11-15

## 2018-11-06 NOTE — TELEPHONE ENCOUNTER
Please see message below     Please advise if pt can just do an RN visit with labs or if pt needs to be seen by PCP     Thank you     Sol Crane RN, BSN  [facility]

## 2018-11-07 DIAGNOSIS — M10.9 ACUTE GOUT OF LEFT FOOT, UNSPECIFIED CAUSE: ICD-10-CM

## 2018-11-07 NOTE — TELEPHONE ENCOUNTER
Pt due for basic metabolic panel to check kidney function.  Must ensure that bp well controlled secondary to his hx of thoracic aortic aneurysm - repaired with PFO closure as well in 2015.     Recommend 20 min appt with me in the next month, please. Ok for acute visit.   BP Readings from Last 3 Encounters:   01/15/18 (!) 146/100   10/07/17 150/83   05/08/17 130/82     Ok for refill x1 in the meantime. Pt is also a kidney donor with only 1 remaining kidney.   TSH   Date Value Ref Range Status   01/15/2018 1.90 0.40 - 4.00 mU/L Final      Please inform patient and Please assist pt in making appt to be seen.

## 2018-11-07 NOTE — TELEPHONE ENCOUNTER
"Requested Prescriptions   Pending Prescriptions Disp Refills     allopurinol (ZYLOPRIM) 100 MG tablet [Pharmacy Med Name: Allopurinol Oral Tablet 100 MG] 90 tablet 0        Last Written Prescription Date:  7.11.18  Last Fill Quantity: 90,  # refills: 1   Last Office Visit: 1/15/2018   Future Office Visit:      Sig: take 1 tablet by mouth daily.    Gout Agents Protocol Failed    11/7/2018  3:24 PM       Failed - Has Uric Acid on file in past 12 months and value is less than 6    Recent Labs   Lab Test  01/15/18   1717   URIC  6.4     If level is 6mg/dL or greater, ok to refill one time and refer to provider.          Passed - CBC on file in past 12 months    Recent Labs   Lab Test  01/15/18   1717   WBC  10.4   RBC  4.86   HGB  14.5   HCT  44.9   PLT  209                Passed - ALT on file in past 12 months    Recent Labs   Lab Test 05/07/18   ALT  28            Passed - Recent (12 mo) or future (30 days) visit within the authorizing provider's specialty    Patient had office visit in the last 12 months or has a visit in the next 30 days with authorizing provider or within the authorizing provider's specialty.  See \"Patient Info\" tab in inbasket, or \"Choose Columns\" in Meds & Orders section of the refill encounter.             Passed - Patient is age 18 or older       Passed - Normal serum creatinine on file in the past 12 months    Recent Labs   Lab Test 05/07/18 04/21/17   1445   CR  1.050   < >   --    CREAT   --    --   1.1    < > = values in this interval not displayed.               "

## 2018-11-08 RX ORDER — ALLOPURINOL 100 MG/1
TABLET ORAL
Qty: 90 TABLET | Refills: 1 | Status: SHIPPED | OUTPATIENT
Start: 2018-11-08 | End: 2019-04-07

## 2018-11-08 NOTE — TELEPHONE ENCOUNTER
Attempt #1  Called patient @ [phone] - Left a non-detailed message to call back and speak with any triage nurse.    Zahra Buck RN  [facility]

## 2018-11-08 NOTE — TELEPHONE ENCOUNTER
Routing refill request to provider for review/approval because:  Labs out of range:  Uric Acid    ADE PalN, RN  [facility]

## 2018-11-12 NOTE — TELEPHONE ENCOUNTER
Pt calling     Advised on the information below     Made an OV for 11/15/2018    Sol Crane RN, BSN  [facility]

## 2018-11-12 NOTE — TELEPHONE ENCOUNTER
Attempt #2  Called # below - Left a non-detailed message to call back and speak with any triage nurse.    Zahra Buck RN  [place]

## 2018-11-15 ENCOUNTER — OFFICE VISIT (OUTPATIENT)
Dept: FAMILY MEDICINE | Facility: CLINIC | Age: 53
End: 2018-11-15
Payer: COMMERCIAL

## 2018-11-15 VITALS
BODY MASS INDEX: 44.1 KG/M2 | HEART RATE: 77 BPM | TEMPERATURE: 98.6 F | DIASTOLIC BLOOD PRESSURE: 88 MMHG | SYSTOLIC BLOOD PRESSURE: 130 MMHG | WEIGHT: 315 LBS | HEIGHT: 71 IN | OXYGEN SATURATION: 93 %

## 2018-11-15 DIAGNOSIS — I10 ESSENTIAL HYPERTENSION WITH GOAL BLOOD PRESSURE LESS THAN 140/90: Primary | ICD-10-CM

## 2018-11-15 DIAGNOSIS — E03.9 HYPOTHYROIDISM, UNSPECIFIED TYPE: ICD-10-CM

## 2018-11-15 DIAGNOSIS — I71.20 THORACIC AORTIC ANEURYSM WITHOUT RUPTURE (H): ICD-10-CM

## 2018-11-15 DIAGNOSIS — I10 ESSENTIAL HYPERTENSION WITH GOAL BLOOD PRESSURE LESS THAN 140/90: ICD-10-CM

## 2018-11-15 DIAGNOSIS — Z11.4 SCREENING FOR HIV (HUMAN IMMUNODEFICIENCY VIRUS): ICD-10-CM

## 2018-11-15 DIAGNOSIS — Z13.6 CARDIOVASCULAR SCREENING; LDL GOAL LESS THAN 100: ICD-10-CM

## 2018-11-15 DIAGNOSIS — E66.01 MORBID (SEVERE) OBESITY DUE TO EXCESS CALORIES (H): ICD-10-CM

## 2018-11-15 DIAGNOSIS — Z12.11 SCREEN FOR COLON CANCER: ICD-10-CM

## 2018-11-15 DIAGNOSIS — Z23 NEED FOR PROPHYLACTIC VACCINATION AND INOCULATION AGAINST INFLUENZA: ICD-10-CM

## 2018-11-15 DIAGNOSIS — Z52.4 KIDNEY DONOR: ICD-10-CM

## 2018-11-15 DIAGNOSIS — N52.9 VASCULOGENIC ERECTILE DYSFUNCTION, UNSPECIFIED VASCULOGENIC ERECTILE DYSFUNCTION TYPE: ICD-10-CM

## 2018-11-15 DIAGNOSIS — M1A.09X0 CHRONIC GOUT OF MULTIPLE SITES, UNSPECIFIED CAUSE: ICD-10-CM

## 2018-11-15 PROCEDURE — 90686 IIV4 VACC NO PRSV 0.5 ML IM: CPT | Performed by: FAMILY MEDICINE

## 2018-11-15 PROCEDURE — 90471 IMMUNIZATION ADMIN: CPT | Performed by: FAMILY MEDICINE

## 2018-11-15 PROCEDURE — 99214 OFFICE O/P EST MOD 30 MIN: CPT | Mod: 25 | Performed by: FAMILY MEDICINE

## 2018-11-15 RX ORDER — LISINOPRIL 20 MG/1
TABLET ORAL
Qty: 90 TABLET | Refills: 1 | Status: SHIPPED | OUTPATIENT
Start: 2018-11-15 | End: 2020-01-08

## 2018-11-15 RX ORDER — LEVOTHYROXINE SODIUM 75 UG/1
75 TABLET ORAL DAILY
Qty: 90 TABLET | Refills: 1 | Status: SHIPPED | OUTPATIENT
Start: 2018-11-15 | End: 2019-07-11

## 2018-11-15 RX ORDER — CARVEDILOL 25 MG/1
TABLET ORAL
Qty: 180 TABLET | Refills: 1 | Status: SHIPPED | OUTPATIENT
Start: 2018-11-15 | End: 2019-10-14

## 2018-11-15 RX ORDER — ATORVASTATIN CALCIUM 40 MG/1
TABLET, FILM COATED ORAL
Qty: 90 TABLET | Refills: 1 | Status: SHIPPED | OUTPATIENT
Start: 2018-11-15 | End: 2019-12-09

## 2018-11-15 RX ORDER — SILDENAFIL 50 MG/1
50 TABLET, FILM COATED ORAL DAILY PRN
Qty: 12 TABLET | Refills: 1 | Status: SHIPPED | OUTPATIENT
Start: 2018-11-15 | End: 2020-01-08

## 2018-11-15 NOTE — NURSING NOTE

## 2018-11-15 NOTE — MR AVS SNAPSHOT
After Visit Summary   11/15/2018    Michael Esteban    MRN: [id number]           Patient Information     Date Of Birth          1965        Visit Information        Provider Department      11/15/2018 2:20 PM Laura Austin MD [facility]        Today's Diagnoses     Essential hypertension with goal blood pressure less than 140/90    -  1    Hypothyroidism, unspecified type        CARDIOVASCULAR SCREENING; LDL GOAL LESS THAN 100- secondary to hx of TAA repair        Screen for colon cancer        Screening for HIV (human immunodeficiency virus)        Need for prophylactic vaccination and inoculation against influenza        Thoracic aortic aneurysm without rupture (H)        Essential hypertension with goal blood pressure less than 140/90- not well controlled today         history of Thoracic aortic aneurysm without rupture - 4.7 cm on 9/8/2014 increased to 5.2 cm 1/2015 - repaired with gortex graft 2/17/2015         Vasculogenic erectile dysfunction, unspecified vasculogenic erectile dysfunction type          Care Instructions    Start walking for exercise - slowly work up to 45-60 min per day.     Restart your lipitor and your levothyroxine and then in 4-6 weeks schedule a fasting lab only visit at our clinic or  You can have this done at any [facility] without appt during their regular outpatient lab hours or by lab only appt at any [place] clinic.       Strongly recommended help with weight loss - medifast, weight watchers, nutrisystem, ideal protein, or  overeaters anonymous - HOW program.       See Patient Instructions.                Thank you for choosing [facility]  for your Health Care. It was a pleasure seeing you at your visit today. Please contact us with any questions or concerns you may have.                   Laura Austin MD                                  To reach your [facility] care team after  hours call:   [phone]    Our clinic hours are:     Monday- 7:30 am - 7:00 pm                             Tuesday through Friday- 7:30 am - 5:00 pm                                        Saturday- 8:00 am - 12:00 pm                  Phone:  [phone]    Our pharmacy hours are:     Monday  8:00 am to 7:00 pm      Tuesday through Friday 8:00am to 6:00pm                        Saturday - 9:00 am to 1:00 pm      Sunday : Closed.              Phone:  [phone]      There is also information available at our web site:  [email or web address]    If your provider ordered any lab tests and you do not receive the results within 10 business days, please call the clinic.    If you need a medication refill please contact your pharmacy.  Please allow 2 business days for your refill to be completed.    Our clinic offers telephone visits and e visits.  Please ask one of your team members to explain more.      Use [email or web address] (secure email communication and access to your chart) to send your primary care provider a message or make an appointment. Ask someone on your Team how to sign up for [email or web address].                           Follow-ups after your visit        Follow-up notes from your care team     Return in about 6 months (around 5/15/2019) for BP Recheck, Physical Exam, Lab Work, Routine Visit.      Future tests that were ordered for you today     Open Future Orders        Priority Expected Expires Ordered    HIV Screening Routine 12/17/2018 2/15/2019 11/15/2018    Lipid panel reflex to direct LDL Fasting Routine 12/17/2018 2/15/2019 11/15/2018    Albumin Random Urine Quantitative with Creat Ratio Routine 12/17/2018 2/15/2019 11/15/2018    Fecal colorectal cancer screen FIT - Future (S+30) Routine 12/6/2018 12/15/2018 11/15/2018    CBC with platelets Routine 12/17/2018 2/15/2019 11/15/2018    Comprehensive metabolic panel Routine 12/17/2018 2/15/2019 11/15/2018    TSH Routine 12/17/2018 2/15/2019 11/15/2018    T4 FREE Routine  "2018 2/15/2019 11/15/2018    T3 total Routine 2018 2/15/2019 11/15/2018            Who to contact     If you have questions or need follow up information about today's clinic visit or your schedule please contact [facility] directly at [phone].  Normal or non-critical lab and imaging results will be communicated to you by MyChart, letter or phone within 4 business days after the clinic has received the results. If you do not hear from us within 7 days, please contact the clinic through [employer] or phone. If you have a critical or abnormal lab result, we will notify you by phone as soon as possible.  Submit refill requests through [employer] or call your pharmacy and they will forward the refill request to us. Please allow 3 business days for your refill to be completed.          Additional Information About Your Visit        MyChart Information     [employer] lets you send messages to your doctor, view your test results, renew your prescriptions, schedule appointments and more. To sign up, go to [email or web address] . Click on \"Log in\" on the left side of the screen, which will take you to the Welcome page. Then click on \"Sign up Now\" on the right side of the page.     You will be asked to enter the access code listed below, as well as some personal information. Please follow the directions to create your username and password.     Your access code is: [email or web address]  Expires: 2019  3:43 PM     Your access code will  in 90 days. If you need help or a new code, please call your [facility] clinic or [phone].        Care EveryWhere ID     This is your Care EveryWhere ID. This could be used by other organizations to access your [facility] medical records  [id number]        Your Vitals Were     Pulse Temperature Height Pulse Oximetry BMI (Body Mass Index)       77 98.6  F (37  C) (Oral) 5' 11\" (1.803 m) 93% 47.89 kg/m2        Blood Pressure from Last 3 Encounters:   11/15/18 " 130/88   01/15/18 (!) 146/100   10/07/17 150/83    Weight from Last 3 Encounters:   11/15/18 (!) 343 lb 6.4 oz (155.8 kg)   01/15/18 (!) 350 lb 3.2 oz (158.8 kg)   05/08/17 (!) 343 lb 3.2 oz (155.7 kg)              We Performed the Following          ADMIN VACCINE, FIRST [36595]     HC FLU VAC PRESRV FREE QUAD SPLIT VIR 3+YRS IM  [35106]          Today's Medication Changes          These changes are accurate as of 11/15/18  3:43 PM.  If you have any questions, ask your nurse or doctor.               These medicines have changed or have updated prescriptions.        Dose/Directions    levothyroxine 75 MCG tablet   Commonly known as:  SYNTHROID/LEVOTHROID   This may have changed:  See the new instructions.   Used for:  Hypothyroidism, unspecified type   Changed by:  Laura Austin MD        Dose:  75 mcg   Take 1 tablet (75 mcg) by mouth daily   Quantity:  90 tablet   Refills:  1            Where to get your medicines      These medications were [id number] [address] [address] [address]     Phone:  [phone]     atorvastatin 40 MG tablet    carvedilol 25 MG tablet    levothyroxine 75 MCG tablet    lisinopril 20 MG tablet    sildenafil 50 MG tablet                Primary Care Provider Office Phone # Fax #    Laura Austin [phone]       [address] [place] [address]        Equal Access to Services     [facility] AH: Hadii lina leach hadasho Soomaali, waaxda luqadaha, qaybta kaalmada adeegyada, sabas genao. So [facility] [phone].    ATENCIÓN: Si roberto carlosla tony, tiene a leigh disposición servicios gratuitos de asistencia lingüística. Llame al [phone].    We comply with applicable federal civil rights laws and [place] laws. We do not discriminate on the basis of race, color, national origin, age, disability, sex, sexual orientation, or gender identity.            Thank you!      Thank you for choosing [facility]  for your care. Our goal is always to provide you with excellent care. Hearing back from our patients is one way we can continue to improve our services. Please take a few minutes to complete the written survey that you may receive in the mail after your visit with us. Thank you!             Your Updated Medication List - Protect others around you: Learn how to safely use, store and throw away your medicines at www.disposemymeds.org.          This list is accurate as of 11/15/18  3:43 PM.  Always use your most recent med list.                   Brand Name Dispense Instructions for use Diagnosis    allopurinol 100 MG tablet    ZYLOPRIM    90 tablet    take 1 tablet by mouth daily.    Acute gout of left foot, unspecified cause       aspirin 81 MG chewable tablet     90 tablet    Take 1 tablet (81 mg) by mouth daily    Aortic aneurysm (H)       atorvastatin 40 MG tablet    LIPITOR    90 tablet    TAKE 1 TABLET (40 MG) BY MOUTH AT BEDTIME FOR CHOLESTEROL    Thoracic aortic aneurysm without rupture (H)       carvedilol 25 MG tablet    COREG    180 tablet    TAKE ONE TABLET BY MOUTH TWICE DAILY with meals    Essential hypertension with goal blood pressure less than 140/90, Thoracic aortic aneurysm without rupture (H)       levothyroxine 75 MCG tablet    SYNTHROID/LEVOTHROID    90 tablet    Take 1 tablet (75 mcg) by mouth daily    Hypothyroidism, unspecified type       lisinopril 20 MG tablet    PRINIVIL/ZESTRIL    90 tablet    Take 1 tablet (20 mg) by mouth daily    Essential hypertension with goal blood pressure less than 140/90, Thoracic aortic aneurysm without rupture (H)       order for DME      Equipment being ordered: RESMED AIRSENSE 10 WITH HH AND RESMED AIRFIT P10 PILLOW MASK.        oxyCODONE IR 5 MG tablet    ROXICODONE    60 tablet    Take 1-2 tablets (5-10 mg) by mouth every 3 hours as needed for moderate to severe pain    Thoracic aortic aneurysm without  rupture (H)       sildenafil 50 MG tablet    VIAGRA    12 tablet    Take 1 tablet (50 mg) by mouth daily as needed 30 min to 4 hrs before sex. Do not use with nitroglycerin, terazosin or doxazosin.    Vasculogenic erectile dysfunction, unspecified vasculogenic erectile dysfunction type       TYLENOL 500 MG tablet   Generic drug:  acetaminophen      Take 500 mg by mouth every 6 hours as needed for mild pain

## 2018-11-15 NOTE — PROGRESS NOTES
SUBJECTIVE:                                                    Michael Esteban is a 53 year old male who presents to clinic today for the following health issues:    Hyperlipidemia Follow-Up: ran out of lipitor about 6 months ago.       Rate your low fat/cholesterol diet?: not monitoring fat    Taking statin?  Yes, no muscle aches from statin    Other lipid medications/supplements?:  None     Lab Results   Component Value Date    CHOL 76 05/08/2017    CHOL 98 03/18/2016     Lab Results   Component Value Date    HDL 30 05/08/2017    HDL 30 03/18/2016     Lab Results   Component Value Date    LDL 31 05/08/2017    LDL 43 03/18/2016     Lab Results   Component Value Date    TRIG 73 05/08/2017    TRIG 127 03/18/2016     Lab Results   Component Value Date    CHOLHDLRATIO 4.0 01/08/2015    CHOLHDLRATIO 3.3 09/09/2014       Hypertension Follow-up:        Outpatient blood pressures are not being checked.    Low Salt Diet: not monitoring salt    BP Readings from Last 5 Encounters:   11/15/18 130/88   01/15/18 (!) 146/100   10/07/17 150/83   05/08/17 130/82   04/05/17 116/72       Hypothyroidism Follow-up - ran out of levothyroxine several weeks ago. He was laid off for the year last Thursday - 1 week ago.       Since last visit, patient describes the following symptoms: Weight stable, no hair loss, no skin changes, no constipation, no loose stools    TSH   Date Value Ref Range Status   01/15/2018 1.90 0.40 - 4.00 mU/L Final   08/22/2016 1.49 0.40 - 4.00 mU/L Final   03/18/2016 2.78 0.40 - 4.00 mU/L Final   02/25/2015 2.84 0.40 - 4.00 mU/L Final     Comment:     Effective 7/30/2014, the reference range for this assay has changed to reflect   new instrumentation/methodology.     01/08/2015 3.56 0.40 - 4.00 mU/L Final     Comment:     Effective 7/30/2014, the reference range for this assay has changed to reflect   new instrumentation/methodology.       T4 Free   Date Value Ref Range Status   01/15/2018 0.93 0.76 - 1.46  ng/dL Final   03/18/2016 0.98 0.76 - 1.46 ng/dL Final         Amount of exercise or physical activity: yard work    Problems taking medications regularly: Yes,  problems remembering to take    Medication side effects: none    Diet: regular (no restrictions)      Patient Active Problem List   Diagnosis     history of Thoracic aortic aneurysm without rupture - 4.7 cm on 9/8/2014 increased to 5.2 cm 1/2015 - repaired with gortex graft 2/17/2015      Kidney donor     Essential hypertension with goal blood pressure less than 140/90     Chronic gout of multiple sites, unspecified cause - sees Dr. [facility]      Hypothyroidism, unspecified type     Vertigo     Tobacco use disorder- pipe/cigar -started smoking cigars/pipe again 6/2015     Atypical chest pain     Alcohol abuse - cut down  s/p TAA repair 2/27/2015 - down to 2-3 beers/week     S/P ascending aortic aneurysm repair     Fever     CARDIOVASCULAR SCREENING; LDL GOAL LESS THAN 100- secondary to hx of TAA repair     SOB (shortness of breath)     BERNADETTE (obstructive sleep apnea)     Pain in joint, ankle and foot     Arthralgia of lower leg     Erectile dysfunction due to diseases classified elsewhere     Morbid (severe) obesity due to excess calories (H)     Umbilical hernia without obstruction and without gangrene     Morbid obesity due to excess calories (H)       Current Outpatient Prescriptions   Medication Sig Dispense Refill     acetaminophen (TYLENOL) 500 MG tablet Take 500 mg by mouth every 6 hours as needed for mild pain       allopurinol (ZYLOPRIM) 100 MG tablet take 1 tablet by mouth daily.    90 tablet 1     aspirin 81 MG chewable tablet Take 1 tablet (81 mg) by mouth daily 90 tablet 3     atorvastatin (LIPITOR) 40 MG tablet TAKE 1 TABLET (40 MG) BY MOUTH AT BEDTIME FOR CHOLESTEROL 90 tablet 1     carvedilol (COREG) 25 MG tablet TAKE ONE TABLET BY MOUTH TWICE DAILY with meals 60 tablet 0     lisinopril (PRINIVIL/ZESTRIL)  "20 MG tablet Take 1 tablet (20 mg) by mouth daily 90 tablet 0     order for DME Equipment being ordered: RESMED AIRSENSE 10 WITH HH AND RESMED AIRFIT P10 PILLOW MASK.       oxyCODONE (ROXICODONE) 5 MG immediate release tablet Take 1-2 tablets (5-10 mg) by mouth every 3 hours as needed for moderate to severe pain 60 tablet 0     sildenafil (VIAGRA) 50 MG tablet Take 1 tablet (50 mg) by mouth daily as needed 30 min to 4 hrs before sex. Do not use with nitroglycerin, terazosin or doxazosin. 12 tablet 1          Allergies   Allergen Reactions     Thalidomide      Reaction during stress test       Wt Readings from Last 5 Encounters:   11/15/18 (!) 343 lb 6.4 oz (155.8 kg)   01/15/18 (!) 350 lb 3.2 oz (158.8 kg)   05/08/17 (!) 343 lb 3.2 oz (155.7 kg)   04/05/17 (!) 354 lb 3.2 oz (160.7 kg)   01/24/17 (!) 330 lb (149.7 kg)          Problem list and histories reviewed & adjusted, as indicated.  Additional history: as documented    Reviewed and updated as needed this visit by clinical staff       Reviewed and updated as needed this visit by Provider         ROS: feeling quite fatigued lately and would like his testosterone level checked.     ROS: 12 point ROS neg other than the symptoms noted above    OBJECTIVE:                                                    /88  Pulse 77  Temp 98.6  F (37  C) (Oral)  Ht 5' 11\" (1.803 m)  Wt (!) 343 lb 6.4 oz (155.8 kg)  SpO2 93%  BMI 47.89 kg/m2  Body mass index is 47.89 kg/(m^2).   GENERAL: healthy, alert, well nourished, well hydrated, no distress  HENT: ear canals- normal; TMs- normal; Nose- normal; Mouth- no ulcers, no lesions  NECK: no tenderness, no adenopathy, no asymmetry, no masses, no stiffness; thyroid- normal to palpation  RESP: lungs clear to auscultation - no rales, no rhonchi, no wheezes  CV: regular rates and rhythm, normal S1 S2, no S3 or S4 and no murmur, no click or rub -  ABDOMEN: soft, no tenderness,umbilical hernia noted , easily reducible,  no  " hepatosplenomegaly, no masses, normal bowel sounds  MS: extremities- no gross deformities noted, no edema    Diagnostic test results:  See [facility] orders.      ASSESSMENT/PLAN:                                                        ICD-10-CM    1. Essential hypertension with goal blood pressure less than 140/90 I10 Albumin Random Urine Quantitative with Creat Ratio     CBC with platelets     Comprehensive metabolic panel   2. Hypothyroidism, unspecified type E03.9 levothyroxine (SYNTHROID/LEVOTHROID) 75 MCG tablet     TSH     T4 FREE     T3 total   3. CARDIOVASCULAR SCREENING; LDL GOAL LESS THAN 100- secondary to hx of TAA repair Z13.6 Lipid panel reflex to direct LDL Fasting   4. Screen for colon cancer Z12.11 Fecal colorectal cancer screen FIT - Future (S+30)   5. Screening for HIV (human immunodeficiency virus) Z11.4 HIV Screening   6. Need for prophylactic vaccination and inoculation against influenza Z23  FLU VAC PRESRV FREE QUAD SPLIT VIR 3+YRS IM  [35305]          ADMIN VACCINE, FIRST [77343]   7. Thoracic aortic aneurysm without rupture (H) I71.2 atorvastatin (LIPITOR) 40 MG tablet   8. Essential hypertension with goal blood pressure less than 140/90- not well controlled today  I10 carvedilol (COREG) 25 MG tablet     lisinopril (PRINIVIL/ZESTRIL) 20 MG tablet   9. history of Thoracic aortic aneurysm without rupture - 4.7 cm on 9/8/2014 increased to 5.2 cm 1/2015 - repaired with gortex graft 2/17/2015  I71.2 carvedilol (COREG) 25 MG tablet     lisinopril (PRINIVIL/ZESTRIL) 20 MG tablet     CBC with platelets   10. Vasculogenic erectile dysfunction, unspecified vasculogenic erectile dysfunction type N52.9 sildenafil (VIAGRA) 50 MG tablet   11. Morbid (severe) obesity due to excess calories (H) E66.01    12. Kidney donor Z52.4    13. Chronic gout of multiple sites, unspecified cause - sees Dr. [facility]  [id number]         Start walking for exercise - slowly work up to  45-60 min per day.     Restart your lipitor and your levothyroxine and then in 4-6 weeks schedule a fasting lab only visit at our clinic or  You can have this done at any [facility] without appt during their regular outpatient lab hours or by lab only appt at any [place] clinic.       Strongly recommended help with weight loss - medifast, weight watchers, nutrisystem, ideal protein, or  overeaters anonymous - HOW program.       See Patient Instructions.     Return in about 6 months (around 5/15/2019) for BP Recheck, Physical Exam, Lab Work, Routine Visit.            Laura Austin MD    [facility]

## 2018-11-15 NOTE — PATIENT INSTRUCTIONS
Start walking for exercise - slowly work up to 45-60 min per day.     Restart your lipitor and your levothyroxine and then in 4-6 weeks schedule a fasting lab only visit at our clinic or  You can have this done at any [facility] without appt during their regular outpatient lab hours or by lab only appt at any [facility].       Strongly recommended help with weight loss - medifast, weight watchers, nutrisystem, ideal protein, or  overeaters anonymous - HOW program.       See Patient Instructions.                Thank you for choosing [facility]  for your Health Care. It was a pleasure seeing you at your visit today. Please contact us with any questions or concerns you may have.                   Laura Austin MD                                  To reach your [facility] care team after hours call:   [phone]    Our clinic hours are:     Monday- 7:30 am - 7:00 pm                             Tuesday through Friday- 7:30 am - 5:00 pm                                        Saturday- 8:00 am - 12:00 pm                  Phone:  [phone]    Our pharmacy hours are:     Monday  8:00 am to 7:00 pm      Tuesday through Friday 8:00am to 6:00pm                        Saturday - 9:00 am to 1:00 pm      Sunday : Closed.              Phone:  [phone]      There is also information available at our web site:  [email or web address]    If your provider ordered any lab tests and you do not receive the results within 10 business days, please call the clinic.    If you need a medication refill please contact your pharmacy.  Please allow 2 business days for your refill to be completed.    Our clinic offers telephone visits and e visits.  Please ask one of your team members to explain more.      Use [employer] (secure email communication and access to your chart) to send your primary care provider a message or make an appointment. Ask someone on your Team how to sign up for  MyChart.

## 2019-01-22 DIAGNOSIS — Z13.6 CARDIOVASCULAR SCREENING; LDL GOAL LESS THAN 100: ICD-10-CM

## 2019-01-22 DIAGNOSIS — Z11.4 SCREENING FOR HIV (HUMAN IMMUNODEFICIENCY VIRUS): ICD-10-CM

## 2019-01-22 DIAGNOSIS — I10 ESSENTIAL HYPERTENSION WITH GOAL BLOOD PRESSURE LESS THAN 140/90: ICD-10-CM

## 2019-04-07 DIAGNOSIS — M10.9 ACUTE GOUT OF LEFT FOOT, UNSPECIFIED CAUSE: ICD-10-CM

## 2019-04-08 RX ORDER — ALLOPURINOL 100 MG/1
TABLET ORAL
Qty: 60 TABLET | Refills: 0 | Status: SHIPPED | OUTPATIENT
Start: 2019-04-08 | End: 2019-09-03

## 2019-04-08 NOTE — TELEPHONE ENCOUNTER
Due for an Office visit for further refills, only fill for 30 days     Sol Crane RN, BSN  [facility]

## 2019-04-08 NOTE — TELEPHONE ENCOUNTER
"Requested Prescriptions   Pending Prescriptions Disp Refills     allopurinol (ZYLOPRIM) 100 MG tablet [Pharmacy Med Name: ALLOPURINOL 100 MG TABLET]    Last Written Prescription Date:  11.8.18  Last Fill Quantity: 90 tablet,  # refills: 1   Last office visit: 11/15/2018 with prescribing provider:  Laura Austin MD       Future Office Visit:       60 tablet 1     Sig: TAKE 1 TABLET BY MOUTH EVERY DAY       Gout Agents Protocol Failed - 4/7/2019  8:39 AM        Failed - CBC on file in past 12 months     Recent Labs   Lab Test 01/15/18  1717   WBC 10.4   RBC 4.86   HGB 14.5   HCT 44.9                    Failed - Has Uric Acid on file in past 12 months and value is less than 6     Recent Labs   Lab Test 01/15/18  1717   URIC 6.4     If level is 6mg/dL or greater, ok to refill one time and refer to provider.           Passed - ALT on file in past 12 months     Recent Labs   Lab Test 05/07/18   ALT 28             Passed - Recent (12 mo) or future (30 days) visit within the authorizing provider's specialty     Patient had office visit in the last 12 months or has a visit in the next 30 days with authorizing provider or within the authorizing provider's specialty.  See \"Patient Info\" tab in inbasket, or \"Choose Columns\" in Meds & Orders section of the refill encounter.              Passed - Medication is active on med list        Passed - Patient is age 18 or older        Passed - Normal serum creatinine on file in the past 12 months     Recent Labs   Lab Test 05/07/18 04/21/17  1445   CR 1.050   < >  --    CREAT  --   --  1.1    < > = values in this interval not displayed.             "

## 2019-09-03 DIAGNOSIS — M10.9 ACUTE GOUT OF LEFT FOOT, UNSPECIFIED CAUSE: ICD-10-CM

## 2019-09-03 NOTE — TELEPHONE ENCOUNTER
"Requested Prescriptions   Pending Prescriptions Disp Refills     allopurinol (ZYLOPRIM) 100 MG tablet            Last Written Prescription Date:  4.8.19  Last Fill Quantity: 60 tablet,  # refills: 0   Last office visit: 11/15/2018 with prescribing provider:  Laura Austin MD             Future Office Visit:       60 tablet 0     Sig: Take 1 tablet (100 mg) by mouth daily       Gout Agents Protocol Failed - 9/3/2019  3:24 PM        Failed - CBC on file in past 12 months     Recent Labs   Lab Test 01/15/18  1717   WBC 10.4   RBC 4.86   HGB 14.5   HCT 44.9                    Failed - ALT on file in past 12 months     Recent Labs   Lab Test 05/07/18   ALT 28             Failed - Has Uric Acid on file in past 12 months and value is less than 6     Recent Labs   Lab Test 01/15/18  1717   URIC 6.4     If level is 6mg/dL or greater, ok to refill one time and refer to provider.           Failed - Normal serum creatinine on file in the past 12 months     Recent Labs   Lab Test 05/07/18 04/21/17  1445   CR 1.050   < >  --    CREAT  --   --  1.1    < > = values in this interval not displayed.             Passed - Recent (12 mo) or future (30 days) visit within the authorizing provider's specialty     Patient had office visit in the last 12 months or has a visit in the next 30 days with authorizing provider or within the authorizing provider's specialty.  See \"Patient Info\" tab in inbasket, or \"Choose Columns\" in Meds & Orders section of the refill encounter.              Passed - Medication is active on med list        Passed - Patient is age 18 or older        "

## 2019-09-05 RX ORDER — ALLOPURINOL 100 MG/1
100 TABLET ORAL DAILY
Qty: 30 TABLET | Refills: 0 | Status: SHIPPED | OUTPATIENT
Start: 2019-09-05 | End: 2019-10-05

## 2019-09-05 NOTE — TELEPHONE ENCOUNTER
No future appt scheduled  Medication is being filled for 1 time refill only due to:  Patient needs to be seen because due for fasting physical.

## 2019-10-05 DIAGNOSIS — M10.9 ACUTE GOUT OF LEFT FOOT, UNSPECIFIED CAUSE: ICD-10-CM

## 2019-10-05 NOTE — TELEPHONE ENCOUNTER
"Requested Prescriptions   Pending Prescriptions Disp Refills     allopurinol (ZYLOPRIM) 100 MG tablet [Pharmacy Med Name: ALLOPURINOL 100  Last Written Prescription Date:  9/5/19  Last Fill Quantity: 30,  # refills: 0   Last Office Visit: 11/15/2018   Future Office Visit:      MG TABLET] 30 tablet 1     Sig: TAKE 1 TABLET BY MOUTH EVERY DAY. DUE FOR AN OFFICE VISIT FOR FURHTER REFILLS.       Gout Agents Protocol Failed - 10/5/2019  9:15 AM        Failed - CBC on file in past 12 months     Recent Labs   Lab Test 01/15/18  1717   WBC 10.4   RBC 4.86   HGB 14.5   HCT 44.9              Failed - ALT on file in past 12 months     Recent Labs   Lab Test 05/07/18   ALT 28           Failed - Has Uric Acid on file in past 12 months and value is less than 6     Recent Labs   Lab Test 01/15/18  1717   URIC 6.4     If level is 6mg/dL or greater, ok to refill one time and refer to provider.           Failed - Normal serum creatinine on file in the past 12 months     Recent Labs   Lab Test 05/07/18 04/21/17  1445   CR 1.050   < >  --    CREAT  --   --  1.1    < > = values in this interval not displayed.           Passed - Recent (12 mo) or future (30 days) visit within the authorizing provider's specialty     Patient has had an office visit with the authorizing provider or a provider within the authorizing providers department within the previous 12 mos or has a future within next 30 days. See \"Patient Info\" tab in inbasket, or \"Choose Columns\" in Meds & Orders section of the refill encounter.            Passed - Medication is active on med list        Passed - Patient is age 18 or older          "

## 2019-10-07 RX ORDER — ALLOPURINOL 100 MG/1
TABLET ORAL
Qty: 30 TABLET | Refills: 1 | Status: SHIPPED | OUTPATIENT
Start: 2019-10-07 | End: 2020-04-14

## 2019-10-08 NOTE — TELEPHONE ENCOUNTER
Due for an Office visit for further refills, only fill for 30 days   Sol Crane RN, BSN  [facility]

## 2019-10-14 DIAGNOSIS — I71.20 THORACIC AORTIC ANEURYSM WITHOUT RUPTURE (H): ICD-10-CM

## 2019-10-14 DIAGNOSIS — I10 ESSENTIAL HYPERTENSION WITH GOAL BLOOD PRESSURE LESS THAN 140/90: ICD-10-CM

## 2019-10-14 RX ORDER — CARVEDILOL 25 MG/1
TABLET ORAL
Qty: 60 TABLET | Refills: 0 | Status: SHIPPED | OUTPATIENT
Start: 2019-10-14 | End: 2019-11-05

## 2019-10-14 NOTE — TELEPHONE ENCOUNTER
Reason for Call:  Medication or medication refill:    Do you use a [facility]?  Name of the pharmacy and phone number for the current request:    [facility] [address]   Name of the medication requested:   carvedilol (COREG) 25 MG tablet  Pt is out of medication     Can we leave a detailed message on this number? Yes      Phone number patient can be reached at: Cell number on file:    Telephone Information:   Mobile [phone]       Best Time:Any    Call taken on 10/14/2019 at 8:48 AM by Diana Cárdenas

## 2019-10-14 NOTE — TELEPHONE ENCOUNTER
"Last Written Prescription Date:  11/15/2018  Last Fill Quantity: 180,  # refills: 1   Last office visit: 11/15/2018 with prescribing provider:  yes   Future Office Visit:        Requested Prescriptions   Pending Prescriptions Disp Refills     carvedilol (COREG) 25 MG tablet 180 tablet 1     Sig: TAKE ONE TABLET BY MOUTH TWICE DAILY with meals       Beta-Blockers Protocol Passed - 10/14/2019  8:50 AM        Passed - Blood pressure under 140/90 in past 12 months     BP Readings from Last 3 Encounters:   11/15/18 130/88   01/15/18 (!) 146/100   10/07/17 150/83                 Passed - Patient is age 6 or older        Passed - Recent (12 mo) or future (30 days) visit within the authorizing provider's specialty     Patient has had an office visit with the authorizing provider or a provider within the authorizing providers department within the previous 12 mos or has a future within next 30 days. See \"Patient Info\" tab in inbasket, or \"Choose Columns\" in Meds & Orders section of the refill encounter.              Passed - Medication is active on med list          Prescription approved per [facility] Refill Protocol.  30 day supply given. Over due for office visit. Per last notes he was to have physical, labs and BP checked again in 5/2019.    Joselyn Sanders, ADE, RN, N  [facility] [phone]      "

## 2019-10-30 DIAGNOSIS — M10.9 ACUTE GOUT OF LEFT FOOT, UNSPECIFIED CAUSE: ICD-10-CM

## 2019-10-30 RX ORDER — ALLOPURINOL 100 MG/1
TABLET ORAL
Qty: 30 TABLET | Refills: 1 | OUTPATIENT
Start: 2019-10-30

## 2019-10-30 NOTE — TELEPHONE ENCOUNTER
"Requested Prescriptions   Pending Prescriptions Disp Refills     allopurinol (ZYLOPRIM) 100 MG tablet [Pharmacy Med Name: ALLOPURINOL 100 MG TABLET]          Last Written Prescription Date:  10.7.19  Last Fill Quantity: 30 tablet,  # refills: 1   Last office visit: 11/15/2018 with prescribing provider:  Laura Austin MD             Future Office Visit:       30 tablet 1     Sig: TAKE 1 TABLET BY MOUTH EVERY DAY. DUE FOR AN OFFICE VISIT FOR FURHTER REFILLS.       Gout Agents Protocol Failed - 10/30/2019  8:36 AM        Failed - CBC on file in past 12 months     Recent Labs   Lab Test 01/15/18  1717   WBC 10.4   RBC 4.86   HGB 14.5   HCT 44.9                    Failed - ALT on file in past 12 months     Recent Labs   Lab Test 05/07/18   ALT 28             Failed - Has Uric Acid on file in past 12 months and value is less than 6     Recent Labs   Lab Test 01/15/18  1717   URIC 6.4     If level is 6mg/dL or greater, ok to refill one time and refer to provider.           Failed - Normal serum creatinine on file in the past 12 months     Recent Labs   Lab Test 05/07/18 04/21/17  1445   CR 1.050   < >  --    CREAT  --   --  1.1    < > = values in this interval not displayed.             Passed - Recent (12 mo) or future (30 days) visit within the authorizing provider's specialty     Patient has had an office visit with the authorizing provider or a provider within the authorizing providers department within the previous 12 mos or has a future within next 30 days. See \"Patient Info\" tab in inbasket, or \"Choose Columns\" in Meds & Orders section of the refill encounter.              Passed - Medication is active on med list        Passed - Patient is age 18 or older        "

## 2019-11-05 DIAGNOSIS — I71.20 THORACIC AORTIC ANEURYSM WITHOUT RUPTURE (H): ICD-10-CM

## 2019-11-05 DIAGNOSIS — I10 ESSENTIAL HYPERTENSION WITH GOAL BLOOD PRESSURE LESS THAN 140/90: ICD-10-CM

## 2019-11-05 NOTE — TELEPHONE ENCOUNTER
"CARVEDILOL 25 MG TABLET  Last Written Prescription Date:  10/14/2019  Last Fill Quantity: 60,  # refills: 0   Last office visit: 11/15/2018 with prescribing provider:  Laura Austin MD   Future Office Visit:  NA    Requested Prescriptions   Pending Prescriptions Disp Refills     carvedilol (COREG) 25 MG tablet [Pharmacy Med Name: CARVEDILOL 25 MG TABLET] 60 tablet 0     Sig: TAKE 1 TABLET BY MOUTH TWICE A DAY WITH MEALS       Beta-Blockers Protocol Passed - 11/5/2019  7:31 AM        Passed - Blood pressure under 140/90 in past 12 months     BP Readings from Last 3 Encounters:   11/15/18 130/88   01/15/18 (!) 146/100   10/07/17 150/83                 Passed - Patient is age 6 or older        Passed - Recent (12 mo) or future (30 days) visit within the authorizing provider's specialty     Patient has had an office visit with the authorizing provider or a provider within the authorizing providers department within the previous 12 mos or has a future within next 30 days. See \"Patient Info\" tab in inbasket, or \"Choose Columns\" in Meds & Orders section of the refill encounter.              Passed - Medication is active on med list          "

## 2019-11-06 RX ORDER — CARVEDILOL 25 MG/1
TABLET ORAL
Qty: 60 TABLET | Refills: 0 | Status: SHIPPED | OUTPATIENT
Start: 2019-11-06 | End: 2019-12-04

## 2019-11-06 NOTE — TELEPHONE ENCOUNTER
Patient due for fasting office visit- 30 days supply given.  Routing to team to schedule appointment     Ashley ROSS RN  [facility]  [phone]

## 2019-11-25 ENCOUNTER — OFFICE VISIT (OUTPATIENT)
Dept: FAMILY MEDICINE | Facility: CLINIC | Age: 54
End: 2019-11-25
Payer: COMMERCIAL

## 2019-11-25 VITALS
TEMPERATURE: 98.6 F | SYSTOLIC BLOOD PRESSURE: 110 MMHG | BODY MASS INDEX: 44.1 KG/M2 | HEIGHT: 71 IN | OXYGEN SATURATION: 96 % | DIASTOLIC BLOOD PRESSURE: 70 MMHG | WEIGHT: 315 LBS | HEART RATE: 67 BPM

## 2019-11-25 DIAGNOSIS — M25.511 RIGHT SHOULDER PAIN, UNSPECIFIED CHRONICITY: Primary | ICD-10-CM

## 2019-11-25 DIAGNOSIS — M25.562 LEFT KNEE PAIN, UNSPECIFIED CHRONICITY: ICD-10-CM

## 2019-11-25 PROCEDURE — 99214 OFFICE O/P EST MOD 30 MIN: CPT | Performed by: NURSE PRACTITIONER

## 2019-11-25 ASSESSMENT — MIFFLIN-ST. JEOR: SCORE: 2390.29

## 2019-11-25 NOTE — PROGRESS NOTES
"Subjective   Michael Esteban is a 54 year old male who presents to clinic today for the following health issues:    HPI   Joint Pain    Onset: x2.5 months    Description:   Location: right shoulder  Character: Sharp and Dull ache    Intensity: 3-5/10    Progression of Symptoms: waxing and waning - depend son what doing, numbness in fingers    Accompanying Signs & Symptoms:  Other symptoms: limited movement, snaps and pops when moves    History:   Previous similar pain: YES- 2 other times - firs time 30 yeas ago-football injuet, 2nd 7-8 years later shoulder was out of place    Precipitating factors:   Trauma or overuse: YES- fell - thought got back in place himself - went to Urgent Care 09/08/2019    Alleviating factors:  Improved by: ice    Therapies Tried and outcome: Tylenol      Seen at Urgent care 9/8/2019 the follow was done and recommended.   1) Call to schedule follow up with [facility] or [facility], whatever you insurance will approve.   2) Take the Prednisone 20mg twice daily for 5 days.  3) Wear the sling as needed for comfort and when the acute pain is better work on range of motion.   4) Take the percocet only for sleep or if the pain is really bothersome.   5) Ice for 15min at least 2-3 times per day also.  Call with any questions or concerns in the meantime.    Patient did not seen orthopedics.     Also Knee swelling last 2 days - history of gout    Reviewed and updated as needed this visit by provider:  Tobacco  Allergies  Meds  Problems  Med Hx  Surg Hx  Fam Hx       Review of Systems   Constitutional, HEENT, cardiovascular, pulmonary, GI, , musculoskeletal, neuro, skin, endocrine and psych systems are negative, except as otherwise noted in the HPI.       Objective   /70 (BP Location: Left arm, Patient Position: Chair, Cuff Size: Adult Large)   Pulse 67   Temp 98.6  F (37  C) (Oral)   Ht 1.803 m (5' 11\")   Wt (!) 153.3 kg (338 lb)   SpO2 96%   BMI 47.14 kg/m   Body mass index is 47.14 " kg/m .  Physical Exam   GENERAL: healthy, alert, well nourished, well hydrated, no distress  RESP: lungs clear to auscultation - no rales, no rhonchi, no wheezes  CV: regular rates and rhythm, normal S1 S2, no S3 or S4 and no murmur, no click or rub -  ABDOMEN: soft, no tenderness, no  hepatosplenomegaly, no masses, normal bowel sounds  MS: extremities- no gross deformities noted, no edema  SHOULDER Exam-Right   Inspection: no swelling, no bruising, no discoloration, no obvious deformity, no asymmetry, no glenohumeral joint anterior bulge, no distal clavicle elevation, no muscle atrophy, no scapular winging   Tenderness of: SC joint- no, clavicle(prox-mid)- no, clavicle-(mid-distal)- no, AC joint- no, acromion- no, anterior capsule- no, prox bicep tendon- no, greater tuberosity- no, prox humerus- no, supraspinatous- yes, infraspinatous- yes, superior trapezious- no, rhomboids- no   Range of Motion: Active- limited due to pain.  Range of Motion: Passive- limited due to pain.   Strength: forward flexion- 4/5, abduction- 5/5, internal rotation- 5/5, external rotation- 4/5 and bicep- full   Special tests: Empty can +   {opposite shoulder examined? Yes all normal      Knee Exam: Inspection: very mild infrapatellar swelling   Tender: prepatellar bursa  Active Range of Motion: all normal  Strength: normal         Assessment & Plan   Michael was seen today for musculoskeletal problem.    Diagnoses and all orders for this visit:    Right shoulder pain, unspecified chronicity  Discussed options of imaging and/or ortho referral versus xray and set up for steroid injection if appropriate. .   Declines xray. Declines NSAID medication.  Requests ortho referral.   -     Cancel: XR Shoulder Right G/E 3 Views; Future  -     ORTHO  REFERRAL    Left knee pain, unspecified chronicity  Bursitis versus a tendonitis in the differential.  Fairly normal exam of left knee.    Reports long standing history of gout but unsure if  this is what is going on.   Declines uric acid lab reports his gout never shows up there.  Encourage orthopedic follow-up.  -     ORTHO  REFERRAL    Tobacco Cessation:   reports that he has been smoking pipe and cigars. He has never used smokeless tobacco.    See Patient Instructions    Return in about 1 month (around 12/25/2019).     STEVIE Anderson-BC     [address] [address]  [email or web address]   Office: [phone]

## 2019-11-25 NOTE — PATIENT INSTRUCTIONS
Patient Education     Shoulder Pain with Uncertain Cause  Shoulder pain can have many causes. Pain often comes from the structures that surround the shoulder joint. These are the joint capsule, ligaments, tendons, muscles, and bursa. Pain can also come from cartilage in the joint. Cartilage can become worn out or injured. It s important to know what s causing your pain so the healthcare provider can use the correct treatment. But sometimes it s difficult to find the exact cause of shoulder pain. You may need to see a specialist (orthopedist). You may also need special tests such as a CT scan or MRI. The provider may need to use special tools to look inside the joint (arthroscopy).  Shoulder pain can be treated with a sling or a device that keeps your shoulder from moving. You can take an anti-inflammatory medicine such as ibuprofen to ease pain. You may need to do special shoulder exercises. Follow up with a specialist if the pain is severe or doesn t go away after a few weeks.  Home care  Follow these tips when caring for yourself at home:    If a sling was given to you, leave it in place for the time advised by your healthcare provider. If you aren t sure how long to wear it, ask for advice. If the sling becomes loose, adjust it so that your forearm is level with the ground. Your shoulder should feel well supported.    Put an ice pack on the injured area for 20 minutes every 1 to 2 hours the first day. You can make your own ice pack by putting ice cubes in a plastic bag. Wrap the bag in a thin towel. Continue with ice packs 3 to 4 times a day for the next 2 days. Then use the pack as needed to ease pain and swelling.    You may use acetaminophen or ibuprofen to control pain, unless another pain medicine was prescribed. If you have chronic liver or kidney disease, talk with your healthcare provider before using these medicines. Also talk with your provider if you ve ever had a stomach ulcer or GI  bleeding.    Shoulder pain may seem worse at night, when there is less to distract you from the pain. If you sleep on your side, try to keep weight off your painful shoulder. Propping pillows behind you may stop you from rolling over onto that shoulder during sleep.     Shoulder and elbow joints can become stiff if left in a sling for too long. You should start range of motion exercises about 7 to 10 days after the injury. Talk with your provider to find out what type of exercises to do and how soon to start.    You can take the sling off to shower or bathe.  Follow-up care  Follow up with your healthcare provider if you don t start to get better in the next 5 days.  When to seek medical advice  Call your healthcare provider right away if any of these occur:    Pain or swelling gets worse or continues for more than a few days    Your hand or fingers become cold, blue, numb, or tingly    Large amount of bruising on your shoulder or upper arm    Difficulty moving your hand or fingers    Weakness in your hand or fingers    Your shoulder becomes stiff    It feels like your shoulder is popping out    You are less able to do your daily activities  Date Last Reviewed: 10/1/2016    9809-8426 [employer]. [address] [place], [place] [address]. All rights reserved. This information is not intended as a substitute for professional medical care. Always follow your healthcare professional's instructions.           Patient Education     Knee Pain with Uncertain Cause    There are several common causes for knee pain. These can include:    A sprain of the ligaments that support the joint    An injury to the cartilage lining of the joint    Arthritis from wear-and-tear or inflammation  There are other causes as well. There may also be swelling, reduced movement of the knee joint, and pain with walking. A definite diagnosis will still need to be made. If your symptoms don't improve, further follow-up and testing may  be needed.  Home care    Stay off the injured leg as much as possible until pain improves.    Apply an ice pack over the injured area for 15 to 20 minutes every 3 to 6 hours. You should do this for the first 24 to 48 hours. You can make an ice pack by filling a plastic bag that seals at the top with ice cubes and then wrapping it with a thin towel. Continue to use ice packs for relief of pain and swelling as needed. As the ice melts, be careful not to get your wrap, splint, or cast wet. After 48 hours, apply heat (warm shower or warm bath) for 15 to 20 minutes several times a day, or alternate ice and heat. If you have to wear a hook-and-loop knee brace, you can open it to apply the ice pack, or heat, directly to the knee. Never put ice directly on the skin. Always wrap the ice in a towel or other type of cloth.    You may use over-the-counter pain medicine to control pain, unless another pain medicine was prescribed. If you have chronic liver or kidney disease or ever had a stomach ulcer or gastrointestinal bleeding, talk with your healthcare provider before using these medicines.    If crutches or a walker have been recommended, don't put weight on the injured leg until you can do so without pain. Check with your healthcare provider before returning to sports or full work duties.    If you have a hook-and-loop knee brace, you can remove it to bathe and sleep, unless told otherwise.  Follow-up care  Follow up with your healthcare provider as advised. This is usually within 1 to 2 weeks.  If X-rays were taken, you will be told of any new findings that may affect your care  Call 911  Call 911 if you have:    Shortness of breath    Chest pain  When to seek medical advice  Call your healthcare provider right away if any of these occur:    Toes or foot becomes swollen, cold, blue, numb, or tingly    Pain or swelling spreads over the knee or calf    Warmth or redness appears over the knee or calf    Other joints become  painful    Rash appears    Fever of 100.4 F (38 C) or higher, or as directed by your healthcare provider    Chills  Date Last Reviewed: 5/1/2018 2000-2018 [employer]. [address] [place] [address], [place] [address]. All rights reserved. This information is not intended as a substitute for professional medical care. Always follow your healthcare professional's instructions.

## 2019-12-02 ENCOUNTER — OFFICE VISIT (OUTPATIENT)
Dept: ORTHOPEDICS | Facility: CLINIC | Age: 54
End: 2019-12-02
Payer: COMMERCIAL

## 2019-12-02 ENCOUNTER — ANCILLARY PROCEDURE (OUTPATIENT)
Dept: GENERAL RADIOLOGY | Facility: CLINIC | Age: 54
End: 2019-12-02
Attending: FAMILY MEDICINE
Payer: COMMERCIAL

## 2019-12-02 VITALS
BODY MASS INDEX: 44.1 KG/M2 | HEIGHT: 71 IN | SYSTOLIC BLOOD PRESSURE: 146 MMHG | DIASTOLIC BLOOD PRESSURE: 96 MMHG | WEIGHT: 315 LBS

## 2019-12-02 DIAGNOSIS — F40.240 CLAUSTROPHOBIA: ICD-10-CM

## 2019-12-02 DIAGNOSIS — M25.511 ACUTE PAIN OF RIGHT SHOULDER: Primary | ICD-10-CM

## 2019-12-02 DIAGNOSIS — M25.511 ACUTE PAIN OF RIGHT SHOULDER: ICD-10-CM

## 2019-12-02 DIAGNOSIS — I10 ESSENTIAL HYPERTENSION WITH GOAL BLOOD PRESSURE LESS THAN 140/90: ICD-10-CM

## 2019-12-02 DIAGNOSIS — M67.911 DYSFUNCTION OF RIGHT ROTATOR CUFF: ICD-10-CM

## 2019-12-02 DIAGNOSIS — I71.20 THORACIC AORTIC ANEURYSM WITHOUT RUPTURE (H): ICD-10-CM

## 2019-12-02 DIAGNOSIS — S43.014A ANTERIOR DISLOCATION OF RIGHT SHOULDER, INITIAL ENCOUNTER: ICD-10-CM

## 2019-12-02 DIAGNOSIS — Z87.39 HX OF DISLOCATION OF SHOULDER: ICD-10-CM

## 2019-12-02 PROCEDURE — 99204 OFFICE O/P NEW MOD 45 MIN: CPT | Performed by: FAMILY MEDICINE

## 2019-12-02 PROCEDURE — 73030 X-RAY EXAM OF SHOULDER: CPT | Mod: RT

## 2019-12-02 RX ORDER — DIAZEPAM 10 MG
10 TABLET ORAL ONCE
Qty: 1 TABLET | Refills: 0 | Status: SHIPPED | OUTPATIENT
Start: 2019-12-02 | End: 2020-01-08

## 2019-12-02 ASSESSMENT — MIFFLIN-ST. JEOR: SCORE: 2390.29

## 2019-12-02 NOTE — TELEPHONE ENCOUNTER
"Requested Prescriptions   Pending Prescriptions Disp Refills     carvedilol (COREG) 25 MG tablet        Last Written Prescription Date:  11.6.19  Last Fill Quantity: 60 tablet,  # refills: 0   Last office visit: 11/25/2019 with prescribing provider:  Laura Austin MD             Future Office Visit:       60 tablet 0     Sig: TAKE 1 TABLET BY MOUTH TWICE A DAY WITH MEALS       Beta-Blockers Protocol Failed - 12/2/2019  3:33 PM        Failed - Blood pressure under 140/90 in past 12 months     BP Readings from Last 3 Encounters:   12/02/19 (!) 146/96   11/25/19 110/70   11/15/18 130/88                 Passed - Patient is age 6 or older        Passed - Recent (12 mo) or future (30 days) visit within the authorizing provider's specialty     Patient has had an office visit with the authorizing provider or a provider within the authorizing providers department within the previous 12 mos or has a future within next 30 days. See \"Patient Info\" tab in inbasket, or \"Choose Columns\" in Meds & Orders section of the refill encounter.              Passed - Medication is active on med list        "

## 2019-12-02 NOTE — PROGRESS NOTES
"ASSESSMENT & PLAN    1. Acute pain of right shoulder    2. Hx of dislocation of shoulder    3. Anterior dislocation of right shoulder, initial encounter    4. Claustrophobia    5. Dysfunction of right rotator cuff      Seen in consultation for acute right shoulder pain, traumatic in nature after a fall resulted in a dislocation.  History of at least 2 previous dislocations.   Reviewed xray -AC joint arthritis that appears related to an old injury/fracture. No glenohumeral arthritis  Concern for rotator cuff tearing  MRI of your right shoulder has been ordered at [facility] -needs open/wide bore. Schedule with [facility] ([phone])  Rx for Valium    I will call you with results of the MRI.  It can take 3-5 days after your MRI before results are available.    -----    SUBJECTIVE  Michael Esteban is a/an 54 year old Right handed male who is seen in consultation at the request of  Tequila Woodard C.N.P. for evaluation of right shoulder pain. The patient is seen by themselves.    Onset: 2.5 month(s) ago. Patient describes injury as he tripped and landed on the right shoulder. He states that he dislocated his right shoulder and himself and a friend \"popped it back into place\".  Location of Pain: right lateral shoulder  Rating of Pain at worst: 8/10  Rating of Pain Currently: 4/10  Worsened by: shoulder abduction and shoulder flexion, reaching overhead and behind back, lifting  Better with: nothing  Treatments tried: rest/activity avoidance, ice, Tylenol, oxycodone  Associated symptoms: clicking, decreased ROM  Orthopedic history: YES - patient reports h/o chronic right shoulder dislocations (2 x times)  Relevant surgical history: NO  Patient Social History: works at     Patient's past medical, surgical, social, and family histories were reviewed today and no pertinent history related to patient's presenting problem.    REVIEW OF SYSTEMS:  10 point ROS is negative other than symptoms noted above in HPI, " "Past Medical History or as stated below  Constitutional: NEGATIVE for fever, chills, change in weight  Skin: NEGATIVE for worrisome rashes, moles or lesions  GI/: NEGATIVE for bowel or bladder changes  Neuro: NEGATIVE for weakness, dizziness or paresthesias    OBJECTIVE:  BP (!) 146/96   Ht 1.803 m (5' 11\")   Wt (!) 153.3 kg (338 lb)   BMI 47.14 kg/m     General: healthy, alert and in no distress  HEENT: no scleral icterus or conjunctival erythema  Skin: no suspicious lesions or rash. No jaundice.  CV: regular rhythm by palpation  Resp: normal respiratory effort without conversational dyspnea   Psych: normal mood and affect  Gait: normal steady gait with appropriate coordination and balance  Neuro: normal light touch sensory exam of the bilateral upper extremities.    MSK:  RIGHT SHOULDER  Inspection:  No anterior buldge deformity at glenohumeral joint  Palpation:    Tender about the AC joint and anterior capsule. Remainder of bony and tendinous landmarks are nontender.  Active Range of Motion:     Abduction 1050, FF 1050,   Strength:    Scapular plane abduction 4+/5, painful,  ER 4+/5, IR 5-/5  Special Tests:    Positive: drop arm/painful arc    Independent visualization of the below image:  X-ray Right Shoulder  Well-preserved glenohumeral joint.  AC joint hypertrophy/degenerative change.  [place] and old injury with remodeling.  Sternotomy wires.  No evidence of calcific tendinopathy.  No fracture or acute osseous findings.    Carin Macias, DO [facility]    "

## 2019-12-02 NOTE — LETTER
"    12/2/2019         RE: Michael Esteban  [address] [facility] [address]        Dear Colleague,    Thank you for referring your patient, Michael Esteban, to the [facility]. Please see a copy of my visit note below.    ASSESSMENT & PLAN    1. Acute pain of right shoulder    2. Hx of dislocation of shoulder    3. Anterior dislocation of right shoulder, initial encounter    4. Claustrophobia    5. Dysfunction of right rotator cuff      Seen in consultation for acute right shoulder pain, traumatic in nature after a fall resulted in a dislocation.  History of at least 2 previous dislocations.   Reviewed xray -AC joint arthritis that appears related to an old injury/fracture. No glenohumeral arthritis  Concern for rotator cuff tearing  MRI of your right shoulder has been ordered at [facility] -needs open/wide bore. Schedule with [facility] ([phone])  Rx for Valium    I will call you with results of the MRI.  It can take 3-5 days after your MRI before results are available.    -----    SUBJECTIVE  Michael Esteban is a/an 54 year old Right handed male who is seen in consultation at the request of  Tequila Woodard C.N.P. for evaluation of right shoulder pain. The patient is seen by themselves.    Onset: 2.5 month(s) ago. Patient describes injury as he tripped and landed on the right shoulder. He states that he dislocated his right shoulder and himself and a friend \"popped it back into place\".  Location of Pain: right lateral shoulder  Rating of Pain at worst: 8/10  Rating of Pain Currently: 4/10  Worsened by: shoulder abduction and shoulder flexion, reaching overhead and behind back, lifting  Better with: nothing  Treatments tried: rest/activity avoidance, ice, Tylenol, oxycodone  Associated symptoms: clicking, decreased ROM  Orthopedic history: YES - patient reports h/o chronic right shoulder dislocations (2 x times)  Relevant surgical history: NO  Patient Social History: " "works at     Patient's past medical, surgical, social, and family histories were reviewed today and no pertinent history related to patient's presenting problem.    REVIEW OF SYSTEMS:  10 point ROS is negative other than symptoms noted above in HPI, Past Medical History or as stated below  Constitutional: NEGATIVE for fever, chills, change in weight  Skin: NEGATIVE for worrisome rashes, moles or lesions  GI/: NEGATIVE for bowel or bladder changes  Neuro: NEGATIVE for weakness, dizziness or paresthesias    OBJECTIVE:  BP (!) 146/96   Ht 1.803 m (5' 11\")   Wt (!) 153.3 kg (338 lb)   BMI 47.14 kg/m      General: healthy, alert and in no distress  HEENT: no scleral icterus or conjunctival erythema  Skin: no suspicious lesions or rash. No jaundice.  CV: regular rhythm by palpation  Resp: normal respiratory effort without conversational dyspnea   Psych: normal mood and affect  Gait: normal steady gait with appropriate coordination and balance  Neuro: normal light touch sensory exam of the bilateral upper extremities.    MSK:  RIGHT SHOULDER  Inspection:  No anterior buldge deformity at glenohumeral joint  Palpation:    Tender about the AC joint and anterior capsule. Remainder of bony and tendinous landmarks are nontender.  Active Range of Motion:     Abduction 1050, FF 1050,   Strength:    Scapular plane abduction 4+/5, painful,  ER 4+/5, IR 5-/5  Special Tests:    Positive: drop arm/painful arc    Independent visualization of the below image:  X-ray Right Shoulder  Well-preserved glenohumeral joint.  AC joint hypertrophy/degenerative change.  [place] and old injury with remodeling.  Sternotomy wires.  No evidence of calcific tendinopathy.  No fracture or acute osseous findings.    Carin Macias DO [facility]      Again, thank you for allowing me to participate in the care of your patient.        Sincerely,        Carin Macias DO    "

## 2019-12-02 NOTE — PATIENT INSTRUCTIONS
1. Acute pain of right shoulder    2. Hx of dislocation of shoulder    3. Anterior dislocation of right shoulder, initial encounter    4. Claustrophobia    5. Dysfunction of right rotator cuff      Reviewed xray -AC joint arthritis that appears related to an old injury/fracture. No arthritis in the large joint  Concern for rotator cuff tearing  MRI of your right shoulder has been ordered at [facility]. Schedule with [facility] ([phone])  Rx for Valium    I will call you with results of the MRI.  It can take 3-5 days after your MRI before results are available.

## 2019-12-04 RX ORDER — CARVEDILOL 25 MG/1
TABLET ORAL
Qty: 90 TABLET | Refills: 0 | Status: SHIPPED | OUTPATIENT
Start: 2019-12-04 | End: 2019-12-12

## 2019-12-04 NOTE — TELEPHONE ENCOUNTER
Pt overdue for medication follow up and px with me.  Ok for 20 min med follow up in the next 1-1.5 months or so and px as a 40 min appt separately in the next 2-3 months.  ? Is pt following up with cardiology as well?   Pt with acute shoulder pain on 12/2/2019. 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  Have nursing or pharmacy send me their note.

## 2019-12-04 NOTE — TELEPHONE ENCOUNTER
Please advise on recent BP and refill request     Thank you     Sol Crane RN, BSN  [facility]

## 2019-12-06 DIAGNOSIS — I71.20 THORACIC AORTIC ANEURYSM WITHOUT RUPTURE (H): ICD-10-CM

## 2019-12-06 NOTE — TELEPHONE ENCOUNTER
Called patient @ [phone] -     Advised of notes below - Patient stated an understanding and agreed with plan.  OV scheduled:   Next 5 appointments (look out 90 days)    Jan 08, 2020 10:20 AM CST  PHYSICAL with Laura Austin MD  [facility] [address] [place] [id number] [phone]          Zahra Buck RN  [facility]

## 2019-12-06 NOTE — TELEPHONE ENCOUNTER
"Requested Prescriptions   Pending Prescriptions Disp Refills     atorvastatin (LIPITOR) 40 MG tablet        Last Written Prescription Date:  11.15.18  Last Fill Quantity: 90 tablet,  # refills: 1   Last office visit: 11/25/2019 with prescribing provider:  Laura Austin MD             Future Office Visit:   Next 5 appointments (look out 90 days)    Jan 08, 2020 10:20 AM CST  PHYSICAL with Laura Austin MD  [facility] [address] [place] [id number] [phone]            90 tablet 1     Sig: TAKE 1 TABLET (40 MG) BY MOUTH AT BEDTIME FOR CHOLESTEROL       Statins Protocol Failed - 12/6/2019 10:16 AM        Failed - LDL on file in past 12 months     Recent Labs   Lab Test 05/08/17  1136   LDL 31             Passed - No abnormal creatine kinase in past 12 months     No lab results found.             Passed - Recent (12 mo) or future (30 days) visit within the authorizing provider's specialty     Patient has had an office visit with the authorizing provider or a provider within the authorizing providers department within the previous 12 mos or has a future within next 30 days. See \"Patient Info\" tab in inbasket, or \"Choose Columns\" in Meds & Orders section of the refill encounter.              Passed - Medication is active on med list        Passed - Patient is age 18 or older        "

## 2019-12-06 NOTE — TELEPHONE ENCOUNTER
Reason for Call:  Medication or medication refill:    Do you use a [facility]?  Name of the pharmacy and phone number for the current request:  [facility] - [phone]    Name of the medication requested: atorvastatin (LIPITOR) 40 MG tablet    Other request: Advised of 72 business hour policy.    Can we leave a detailed message on this number? NO    Phone number patient can be reached at: Cell number on file:    Telephone Information:   Mobile [phone]       Best Time: anytime    Call taken on 12/6/2019 at 10:14 AM by Scooter BUCK

## 2019-12-09 RX ORDER — ATORVASTATIN CALCIUM 40 MG/1
TABLET, FILM COATED ORAL
Qty: 60 TABLET | Refills: 0 | Status: SHIPPED | OUTPATIENT
Start: 2019-12-09 | End: 2020-02-05

## 2019-12-12 ENCOUNTER — TRANSFERRED RECORDS (OUTPATIENT)
Dept: HEALTH INFORMATION MANAGEMENT | Facility: CLINIC | Age: 54
End: 2019-12-12

## 2019-12-12 RX ORDER — CARVEDILOL 25 MG/1
TABLET ORAL
Qty: 180 TABLET | Refills: 0 | Status: SHIPPED | OUTPATIENT
Start: 2019-12-12 | End: 2020-01-08

## 2019-12-12 NOTE — TELEPHONE ENCOUNTER
Patient takes the Carvedilol twice per day. Last order was only for #90.  Pharmacy is requesting new order for #180.      New order sent.      ADE Ramos, RN, N  [facility]  Office: [phone]  Fax: [phone]

## 2019-12-17 ENCOUNTER — TELEPHONE (OUTPATIENT)
Dept: ORTHOPEDICS | Facility: CLINIC | Age: 54
End: 2019-12-17

## 2019-12-17 NOTE — TELEPHONE ENCOUNTER
Received MRI results of right shoulder completed on 12/12/2019 from [facility].      Conclusion:  1.  Complete full-thickness tears of the supraspinatus and infraspinatus tendons with maximal proximal/medial tendon retraction to the level of the glenoid, superior subluxation of the humeral head, moderate atrophy of the supraspinatus muscle, and moderate to marked atrophy of the infraspinatus muscle.  2.  Articular and bursal sided fraying of the superior portion of the subscapularis tendon.  3.  Findings associated with adhesive capsulitis, but this is more of a clinical diagnosis.  Correlate with active and passive range of motion.  4.  Biceps tenosynovitis.  No tear or medial subluxation of the long head of the biceps tendon.  5.  Small glenohumeral joint effusion and substantial synovitis.  6.  Subacromial enthesophyte.  7.  Large and irregular clavicular enthesophyte at the coracoclavicular ligament attachment.  No acute tear of the coracoclavicular ligament.  8.  Moderate acromioclavicular joint osteoarthritis with moderate inferior osteophytosis/hypertrophy.  9.  Fraying/degenerative tearing of most of the labrum is suspected although it must be noted that the labrum is not well evaluated because of non-arthrogram technique.      Plan from last office visit on 12/2/2019 was to call patient with results.    Priscila Diaz ATC

## 2019-12-17 NOTE — TELEPHONE ENCOUNTER
Called to discuss MRI results.  Left message to return my phone call in the office.    Carin Macias DO, TALI  [facility]    Discussed results. Recommend IA cortisone and structured PT. Can also consider surgical referral to review his options - although recommend non-surgical management initially. Double booked for 12/23 at 12:20pm for an injection.    Carin Mcaias DO, TALI  [facility]

## 2019-12-23 ENCOUNTER — OFFICE VISIT (OUTPATIENT)
Dept: ORTHOPEDICS | Facility: CLINIC | Age: 54
End: 2019-12-23
Payer: COMMERCIAL

## 2019-12-23 DIAGNOSIS — S43.431A TEAR OF RIGHT GLENOID LABRUM, INITIAL ENCOUNTER: ICD-10-CM

## 2019-12-23 DIAGNOSIS — E03.9 HYPOTHYROIDISM, UNSPECIFIED TYPE: ICD-10-CM

## 2019-12-23 DIAGNOSIS — Z87.39 HX OF DISLOCATION OF SHOULDER: Primary | ICD-10-CM

## 2019-12-23 DIAGNOSIS — S46.011A TRAUMATIC COMPLETE TEAR OF RIGHT ROTATOR CUFF, INITIAL ENCOUNTER: ICD-10-CM

## 2019-12-23 PROCEDURE — 20611 DRAIN/INJ JOINT/BURSA W/US: CPT | Mod: RT | Performed by: FAMILY MEDICINE

## 2019-12-23 RX ORDER — METHYLPREDNISOLONE ACETATE 40 MG/ML
40 INJECTION, SUSPENSION INTRA-ARTICULAR; INTRALESIONAL; INTRAMUSCULAR; SOFT TISSUE
Status: SHIPPED | OUTPATIENT
Start: 2019-12-23

## 2019-12-23 RX ADMIN — METHYLPREDNISOLONE ACETATE 40 MG: 40 INJECTION, SUSPENSION INTRA-ARTICULAR; INTRALESIONAL; INTRAMUSCULAR; SOFT TISSUE at 12:12

## 2019-12-23 NOTE — TELEPHONE ENCOUNTER
"Requested Prescriptions   Pending Prescriptions Disp Refills     levothyroxine (SYNTHROID/LEVOTHROID) 75 MCG tablet [Pharmacy Med Name: LEVOTHYROXINE 75 MCG TABLET] 90 tablet 1     Sig: TAKE 1 TABLET BY MOUTH EVERY DAY       Last Written Prescription Date:  7/11/2019  Last Fill Quantity: 90,  # refills: 1   Last office visit: 11/25/2019 with prescribing provider:     Future Office Visit:   Next 5 appointments (look out 90 days)    Dec 23, 2019 12:20 PM CST  Return Visit with Carin Macias DO  [facility] ([facility]) [address]  [address]  [facility] [address]  [phone]   Jan 08, 2020 10:20 AM CST  [facility] with [facility] Austin [facility] [address] [place] [id number]  [phone]               Thyroid Protocol Failed - 12/23/2019  3:24 AM        Failed - Normal TSH on file in past 12 months     Recent Labs   Lab Test 01/15/18  1717   TSH 1.90              Passed - Patient is 12 years or older        Passed - Recent (12 mo) or future (30 days) visit within the authorizing provider's specialty     Patient has had an office visit with the authorizing provider or a provider within the authorizing providers department within the previous 12 mos or has a future within next 30 days. See \"Patient Info\" tab in inbasket, or \"Choose Columns\" in Meds & Orders section of the refill encounter.              Passed - Medication is active on med list        "

## 2019-12-23 NOTE — PROGRESS NOTES
ASSESSMENT & PLAN    1. Hx of dislocation of shoulder    2. Traumatic complete tear of right rotator cuff, initial encounter    3. Tear of right glenoid labrum, initial encounter      Steroid injection of the right shoulder: intra-articular  was performed today in clinic  Referral to physical therapy  Referral to Ortho Surgery    -----      SUBJECTIVE:  Michael Esteban is a 54 year old male who is seen in follow-up for right shoulder pain.They were last seen 12/02/2019 and had MRI of right shoulder completed at [facility] on 12/12/19.    They indicate that their current pain level is 4/10.  Pain is located over right lateral shoulder and is made worse with shoulder abduction and shoulder flexion, reaching overhead and behind back, and lifting. They have tried rest/activity avoidance, ice, Tylenol and other medications: Vicodin (prn, patient states he took one this morning which made him feel hot and sweaty) and Oxycodone/Acetaminophen (Percocet).      The patient is seen by themselves.    Independent visualization of the below image:  MRI Right Shoulder 12/12/19 - [facility]        Large Joint Injection/Arthocentesis: R glenohumeral joint  Date/Time: 12/23/2019 12:12 PM  Performed by: Carin Macias DO  Authorized by: Carin Macias DO     Indications:  Pain and osteoarthritis  Needle Size:  22 G  Guidance: ultrasound    Approach:  Posterior  Location:  Shoulder      Site:  R glenohumeral joint  Medications:  40 mg methylPREDNISolone 40 MG/ML  Outcome:  Tolerated well, no immediate complications  Procedure discussed: discussed risks, benefits, and alternatives    Consent Given by:  Patient  Timeout: timeout called immediately prior to procedure    Prep: patient was prepped and draped in usual sterile fashion       Pain noted to be a 4/10 before completion of the procedure and 4/10 after completion of the procedure.        Carin Macias DO [facility]

## 2019-12-23 NOTE — LETTER
12/23/2019         RE: Michael Esteban  [address] [id number]        Dear Colleague,    Thank you for referring your patient, Michael Esteban, to the [facility]. Please see a copy of my visit note below.    ASSESSMENT & PLAN    1. Hx of dislocation of shoulder    2. Traumatic complete tear of right rotator cuff, initial encounter    3. Tear of right glenoid labrum, initial encounter      Steroid injection of the right shoulder: intra-articular  was performed today in clinic  Referral to physical therapy  Referral to Ortho Surgery    -----      SUBJECTIVE:  Michael Esteban is a 54 year old male who is seen in follow-up for right shoulder pain.They were last seen 12/02/2019 and had MRI of right shoulder completed at [facility] on 12/12/19.    They indicate that their current pain level is 4/10.  Pain is located over right lateral shoulder and is made worse with shoulder abduction and shoulder flexion, reaching overhead and behind back, and lifting. They have tried rest/activity avoidance, ice, Tylenol and other medications: Vicodin (prn, patient states he took one this morning which made him feel hot and sweaty) and Oxycodone/Acetaminophen (Percocet).      The patient is seen by themselves.    Independent visualization of the below image:  MRI Right Shoulder 12/12/19 - [facility]        Large Joint Injection/Arthocentesis: R glenohumeral joint  Date/Time: 12/23/2019 12:12 PM  Performed by: Carin Macias DO  Authorized by: Carin Macias DO     Indications:  Pain and osteoarthritis  Needle Size:  22 G  Guidance: ultrasound    Approach:  Posterior  Location:  Shoulder      Site:  R glenohumeral joint  Medications:  40 mg methylPREDNISolone 40 MG/ML  Outcome:  Tolerated well, no immediate complications  Procedure discussed: discussed risks, benefits, and alternatives    Consent Given by:  Patient  Timeout: timeout called immediately prior to procedure     Prep: patient was prepped and draped in usual sterile fashion       Pain noted to be a 4/10 before completion of the procedure and 4/10 after completion of the procedure.        Carin Macias DO [facility]        Again, thank you for allowing me to participate in the care of your patient.        Sincerely,        Carin Macias, DO

## 2019-12-23 NOTE — PATIENT INSTRUCTIONS
1. Hx of dislocation of shoulder    2. Traumatic complete tear of right rotator cuff, initial encounter    3. Tear of right glenoid labrum, initial encounter      Steroid injection of the right shoulder: intra-articular  was performed today in clinic  Referral to physical therapy  Referral to Ortho Surgery    - Would not soak in a hot tub, bath or swimming pool for 48 hours  - Ok to shower  - Ice today and only do your normal amounts of activity  - The lidocaine (what is giving you pain relief right now) will likely stop working in 1-2 hours.  You will then have pain again, similar to before you received the injection. The corticosteroid will not start working until approximately 1-2 weeks from now.  In a small percentage of people, cortisone can cause flushing/redness in the face. This usually lasts for 1-3 days and resolves. Cool compress and Ibuprofen/Tylenol can help if this happens.

## 2019-12-24 RX ORDER — LEVOTHYROXINE SODIUM 75 UG/1
TABLET ORAL
Qty: 90 TABLET | Refills: 0 | Status: SHIPPED | OUTPATIENT
Start: 2019-12-24 | End: 2020-01-08

## 2019-12-27 ENCOUNTER — OFFICE VISIT (OUTPATIENT)
Dept: ORTHOPEDICS | Facility: CLINIC | Age: 54
End: 2019-12-27
Payer: COMMERCIAL

## 2019-12-27 VITALS
SYSTOLIC BLOOD PRESSURE: 130 MMHG | WEIGHT: 315 LBS | HEIGHT: 71 IN | DIASTOLIC BLOOD PRESSURE: 82 MMHG | BODY MASS INDEX: 44.1 KG/M2

## 2019-12-27 DIAGNOSIS — M12.811 RIGHT ROTATOR CUFF TEAR ARTHROPATHY: ICD-10-CM

## 2019-12-27 DIAGNOSIS — M75.121 NONTRAUMATIC COMPLETE TEAR OF RIGHT ROTATOR CUFF: ICD-10-CM

## 2019-12-27 DIAGNOSIS — M75.101 RIGHT ROTATOR CUFF TEAR ARTHROPATHY: ICD-10-CM

## 2019-12-27 DIAGNOSIS — M25.611 STIFFNESS OF RIGHT SHOULDER JOINT: Primary | ICD-10-CM

## 2019-12-27 PROCEDURE — 99203 OFFICE O/P NEW LOW 30 MIN: CPT | Performed by: ORTHOPAEDIC SURGERY

## 2019-12-27 ASSESSMENT — MIFFLIN-ST. JEOR: SCORE: 2390.29

## 2019-12-27 NOTE — PROGRESS NOTES
"CHIEF COMPLAINT: Right shoulder pain    DIAGNOSIS: Right shoulder full thickness rotator cuff tear of supraspinatus and infraspinatus tendons with Grade IV atrophy, Hamada Grade 1 rotator cuff tear arthropathy, post-traumatic right shoulder stiffness, history of recurrent traumatic shoulder instability.    OCCUPATION/SPORT: Currently unemployed, seasonal     HPI:   Michael Esteban is a 54 year old, right-hand dominant male who presents for evaluation of right shoulder pain.  Symptoms started only 3 months ago and he reports that his shoulder was normal before this. There was a precipitating event where the patient fell on and dislocated his right shoulder. He states that he had a friend \"pull\" on the arm to put it back in place. He did not seek evaluation at that time. No recurrent dislocation since injury; however he does note that he has had two previous right shoulder dislocations (30 and 15 years ago). At present, Michael's greatest complaint is that his right shoulder has markedly decreased mobility and associated pain. The pain is located to the right anterior shoulder. Worst pain is rated a 10 of 10, and current pain is rated at 3 of 10. Symptoms are worsened by active shoulder motion. Symptoms are improved with rest. Patient received  intra-articular corticosteroid injection on 12/23/2019 by Dr. Carin Macias with slight relief to date. Associated symptoms include burning anterior shoulder pain, clicking, mild tingling sensation of lateral elbow and forearm. Notably, the patient has had two previous right shoulder dislocations (30 and 15 years ago). No other concerns or complaints at this time.    PAST MEDICAL HISTORY:  Past Medical History:   Diagnosis Date     Gout      Hyperlipidemia      Hypertension      Hypothyroidism      Kidney donor 1992     donated Left kidney to mother in 1992     Morbid (severe) obesity due to excess calories (H)     comorbid w/: Htn, Erectile " dysfunction, Swelling of legs, Excessively sleep during the day, Awaken from sleep to catch breath, Shortness of Breath with activity, Loud snoring, Stop breathing while asleep.      Thoracic aortic aneurysm without rupture (H)     4.7 cm - s/p repair 2/2015     CURRENT MEDICATIONS:  Current Outpatient Medications   Medication     acetaminophen (TYLENOL) 500 MG tablet     allopurinol (ZYLOPRIM) 100 MG tablet     aspirin 81 MG chewable tablet     atorvastatin (LIPITOR) 40 MG tablet     carvedilol (COREG) 25 MG tablet     levothyroxine (SYNTHROID/LEVOTHROID) 75 MCG tablet     lisinopril (PRINIVIL/ZESTRIL) 20 MG tablet     order for DME     oxyCODONE (ROXICODONE) 5 MG immediate release tablet     sildenafil (VIAGRA) 50 MG tablet     Current Facility-Administered Medications   Medication     methylPREDNISolone (DEPO-MEDROL) injection 40 mg     ALLERGIES:      Allergies   Allergen Reactions     Thalidomide      Reaction during stress test       PAST SURGICAL HISTORY:  Past Surgical History:   Procedure Laterality Date     ARTHROSCOPY KNEE Right 2005      COLONOSCOPY N/A 1/24/2017    Procedure: COLONOSCOPY;  Surgeon: Maikol Faulkner MD;  Location: [facility]     REPAIR ANEURYSM ASCENDING AORTA N/A 2/17/2015    Procedure: REPAIR ANEURYSM ASCENDING AORTA;  Surgeon: Monae Lechuga MD;  Location: [facility]     REPAIR PATENT FORAMEN OVALE N/A 2/17/2015    Procedure: REPAIR PATENT FORAMEN OVALE;  Surgeon: Monae Lechuga MD;  Location: [facility] OR     s/p left nephrectomy - donor to mother  1992     TONSILLECTOMY  age 3      FAMILY HISTORY: No known family history of bleeding, clotting, or anesthesia related complications.    SOCIAL HISTORY: Patient is  and is here today with his wife Dacia. He does seasonal  work.  He does not exercise regularly.  He does smoke cigars and a pipe.    TOXIC HABITS:  I spoke with Michael today regarding tobacco use and they informed me that they currently are using tobacco  "products.    REVIEW OF SYSTEMS:  Constitutional: Normal  Otolaryngeal: Normal  Cardiovascular: Normal  Pulmonary: Normal  Gastrointestinal: Normal  Genitourinary: Normal  Musculoskeletal: As noted above in the HPI, otherwise normal  Skin: Normal  Neurological: Normal  Psychiatric: Normal  Endocrine: Normal  Hematologic: Normal    PHYSICAL EXAM:  Patient is 5' 11\" and weighs 338 lbs 0 oz. /82   Ht 1.803 m (5' 11\")   Wt (!) 153.3 kg (338 lb)   BMI 47.14 kg/m    Constitutional: Well-developed, well-nourished, healthy appearing male.  Skin: Warm, dry, and without rashes.   HEENT: Normal.  Cardiac: Well perfused extremities, strong 2+ peripheral pulses. No edema.   Pulmonary: Non-labored respirations on room air without audible wheezes.   Abdomen: Soft, nontender.  Musculoskeletal: Patient ambulates with a slow, symmetric, and steady gait. Active range of motion of the right shoulder is 90/20/10/BP compared to 165/80/45/T10 on the left. Supine passive right shoulder motion on the right is 125/20/10 today. Right shoulder has positive Neer, Fernandez, and Wilmer, negative on the left side. No significant weakness with external rotation strength testing at the side bilaterally. Mild bicipital groove tenderness and equivocal Speed's test. No AC, Sternoclavicular, cross-body adduction, scars, deformity, belly-press, lift-off, external rotation lag sign, internal rotation lag sign, hornblower's bilaterally. No generalized ligamentous laxity. Neurovascular exam and cervical spine exam are normal.    IMAGING:   All imaging was personally reviewed by me.    Right shoulder 3 view radiographic series dated 12/2/2019 includes a Grashey, scapular Y, and axillary radiograph view.  No evidence of fracture or dislocation on this series, however there is a small Hill-Sachs notable on the axillary radiograph.  There is also moderate AC joint arthrosis.    MRI of the right shoulder dated 12/12/2019 was also reviewed.  This " demonstrates a full-thickness tear of the supraspinatus and infraspinatus tendons with grade 4 atrophy of both muscle bellies and retraction of the tendons medial to the glenohumeral joint line.  Intact subscap and teres minor.  There is notable teres minor muscle hypertrophy.    IMPRESSION: 54 year old-year-old right hand dominant male, with the following issues:  1. Right shoulder full thickness rotator cuff tear of supraspinatus and infraspinatus tendons with Grade IV atrophy  2. Right shoulder Hamada Grade 1 rotator cuff tear arthropathy  3. Right shoulder post-traumatic shoulder stiffness  4. Right shoulder history of recurrent traumatic shoulder instability    PLAN:   I went through all of the imaging and diagnoses with the patient today.  I explained that he has an irreparable massive rotator cuff tear of the supraspinatus and infraspinatus tendons.  However despite this, his high-grade atrophy of both of these muscle bellies suggest that this is a longstanding issue and has been this way for several years.  Interestingly, Michael notes that his right shoulder was normal prior to this most recent dislocation approximately 3 months ago.  I feel that right now most of Juan's symptoms are coming from his decreased mobility and right shoulder posttraumatic stiffness.  He has already received a corticosteroid injection of the right glenohumeral joint courtesy of Dr. Macias on 12/23/2019.  He is starting to note some relief from this.  I explained that the other piece of this is to obtain full flexibility of the right shoulder.  For this I recommend a course of physiotherapy to work on progressive 4 quadrant stretching.  Once full flexibility has been achieved, he may progress to strengthening exercises.  The goal would be able to restore him to his preinjury state acknowledging that his rotator cuff tendons will never be able to be repaired.  I did explain that there are surgical procedures that can address  these issues such as arthroscopic debridement, partial cuff repair and possible superior capsular reconstruction.  However I do not feel that this type of surgery is in his best interest at the present time.  I recommend a course of physiotherapy and return to clinic in approximately 4 months with no imaging for repeat evaluation consideration of a repeat corticosteroid injection at that time.      At the conclusion of the office visit, Michael verbally acknowledged that I answered all of his questions satisfactorily.

## 2019-12-27 NOTE — LETTER
"    12/27/2019         RE: Michael Esteban  [address] [facility] [id number]        Dear Colleague,    Thank you for referring your patient, Michael Esteban, to the [facility] ORTH[facility] SURGERY. Please see a copy of my visit note below.    CHIEF COMPLAINT: Right shoulder pain    DIAGNOSIS: Right shoulder full thickness rotator cuff tear of supraspinatus and infraspinatus tendons with Grade IV atrophy, Hamada Grade 1 rotator cuff tear arthropathy, post-traumatic right shoulder stiffness, history of recurrent traumatic shoulder instability.    OCCUPATION/SPORT: Currently unemployed, seasonal     HPI:   Michael Esteban is a 54 year old, right-hand dominant male who presents for evaluation of right shoulder pain.  Symptoms started only 3 months ago and he reports that his shoulder was normal before this. There was a precipitating event where the patient fell on and dislocated his right shoulder. He states that he had a friend \"pull\" on the arm to put it back in place. He did not seek evaluation at that time. No recurrent dislocation since injury; however he does note that he has had two previous right shoulder dislocations (30 and 15 years ago). At present, Michael's greatest complaint is that his right shoulder has markedly decreased mobility and associated pain. The pain is located to the right anterior shoulder. Worst pain is rated a 10 of 10, and current pain is rated at 3 of 10. Symptoms are worsened by active shoulder motion. Symptoms are improved with rest. Patient received  intra-articular corticosteroid injection on 12/23/2019 by Dr. Carin Macias with slight relief to date. Associated symptoms include burning anterior shoulder pain, clicking, mild tingling sensation of lateral elbow and forearm. Notably, the patient has had two previous right shoulder dislocations (30 and 15 years ago). No other concerns or complaints at this time.    PAST MEDICAL " HISTORY:  Past Medical History:   Diagnosis Date     Gout      Hyperlipidemia      Hypertension      Hypothyroidism      Kidney donor 1992     donated Left kidney to mother in 1992     Morbid (severe) obesity due to excess calories (H)     comorbid w/: Htn, Erectile dysfunction, Swelling of legs, Excessively sleep during the day, Awaken from sleep to catch breath, Shortness of Breath with activity, Loud snoring, Stop breathing while asleep.      Thoracic aortic aneurysm without rupture (H)     4.7 cm - s/p repair 2/2015     CURRENT MEDICATIONS:  Current Outpatient Medications   Medication     acetaminophen (TYLENOL) 500 MG tablet     allopurinol (ZYLOPRIM) 100 MG tablet     aspirin 81 MG chewable tablet     atorvastatin (LIPITOR) 40 MG tablet     carvedilol (COREG) 25 MG tablet     levothyroxine (SYNTHROID/LEVOTHROID) 75 MCG tablet     lisinopril (PRINIVIL/ZESTRIL) 20 MG tablet     order for DME     oxyCODONE (ROXICODONE) 5 MG immediate release tablet     sildenafil (VIAGRA) 50 MG tablet     Current Facility-Administered Medications   Medication     methylPREDNISolone (DEPO-MEDROL) injection 40 mg     ALLERGIES:      Allergies   Allergen Reactions     Thalidomide      Reaction during stress test       PAST SURGICAL HISTORY:  Past Surgical History:   Procedure Laterality Date     ARTHROSCOPY KNEE Right 2005      COLONOSCOPY N/A 1/24/2017    Procedure: COLONOSCOPY;  Surgeon: Maikol Faulkner MD;  Location: [facility]     REPAIR ANEURYSM ASCENDING AORTA N/A 2/17/2015    Procedure: REPAIR ANEURYSM ASCENDING AORTA;  Surgeon: Monae Lechuga MD;  Location: [facility] OR     REPAIR PATENT FORAMEN OVALE N/A 2/17/2015    Procedure: REPAIR PATENT FORAMEN OVALE;  Surgeon: Monae Lechuga MD;  Location: [facility] OR     s/p left nephrectomy - donor to mother  1992     TONSILLECTOMY  age 3      FAMILY HISTORY: No known family history of bleeding, clotting, or anesthesia related complications.    SOCIAL HISTORY: Patient is  and is  "here today with his wife Dacia. He does seasonal  work.  He does not exercise regularly.  He does smoke cigars and a pipe.    TOXIC HABITS:  I spoke with Michael today regarding tobacco use and they informed me that they currently are using tobacco products.    REVIEW OF SYSTEMS:  Constitutional: Normal  Otolaryngeal: Normal  Cardiovascular: Normal  Pulmonary: Normal  Gastrointestinal: Normal  Genitourinary: Normal  Musculoskeletal: As noted above in the HPI, otherwise normal  Skin: Normal  Neurological: Normal  Psychiatric: Normal  Endocrine: Normal  Hematologic: Normal    PHYSICAL EXAM:  Patient is 5' 11\" and weighs 338 lbs 0 oz. /82   Ht 1.803 m (5' 11\")   Wt (!) 153.3 kg (338 lb)   BMI 47.14 kg/m     Constitutional: Well-developed, well-nourished, healthy appearing male.  Skin: Warm, dry, and without rashes.   HEENT: Normal.  Cardiac: Well perfused extremities, strong 2+ peripheral pulses. No edema.   Pulmonary: Non-labored respirations on room air without audible wheezes.   Abdomen: Soft, nontender.  Musculoskeletal: Patient ambulates with a slow, symmetric, and steady gait. Active range of motion of the right shoulder is 90/20/10/BP compared to 165/80/45/T10 on the left. Supine passive right shoulder motion on the right is 125/20/10 today. Right shoulder has positive Neer, Fernandez, and Wilmer, negative on the left side. No significant weakness with external rotation strength testing at the side bilaterally. Mild bicipital groove tenderness and equivocal Speed's test. No AC, Sternoclavicular, cross-body adduction, scars, deformity, belly-press, lift-off, external rotation lag sign, internal rotation lag sign, hornblower's bilaterally. No generalized ligamentous laxity. Neurovascular exam and cervical spine exam are normal.    IMAGING:   All imaging was personally reviewed by me.    Right shoulder 3 view radiographic series dated 12/2/2019 includes a Grashey, scapular Y, and axillary " radiograph view.  No evidence of fracture or dislocation on this series, however there is a small Hill-Sachs notable on the axillary radiograph.  There is also moderate AC joint arthrosis.    MRI of the right shoulder dated 12/12/2019 was also reviewed.  This demonstrates a full-thickness tear of the supraspinatus and infraspinatus tendons with grade 4 atrophy of both muscle bellies and retraction of the tendons medial to the glenohumeral joint line.  Intact subscap and teres minor.  There is notable teres minor muscle hypertrophy.    IMPRESSION: 54 year old-year-old right hand dominant male, with the following issues:  1. Right shoulder full thickness rotator cuff tear of supraspinatus and infraspinatus tendons with Grade IV atrophy  2. Right shoulder Hamada Grade 1 rotator cuff tear arthropathy  3. Right shoulder post-traumatic shoulder stiffness  4. Right shoulder history of recurrent traumatic shoulder instability    PLAN:   I went through all of the imaging and diagnoses with the patient today.  I explained that he has an irreparable massive rotator cuff tear of the supraspinatus and infraspinatus tendons.  However despite this, his high-grade atrophy of both of these muscle bellies suggest that this is a longstanding issue and has been this way for several years.  Interestingly, Michael notes that his right shoulder was normal prior to this most recent dislocation approximately 3 months ago.  I feel that right now most of Juan's symptoms are coming from his decreased mobility and right shoulder posttraumatic stiffness.  He has already received a corticosteroid injection of the right glenohumeral joint courtesy of Dr. Macias on 12/23/2019.  He is starting to note some relief from this.  I explained that the other piece of this is to obtain full flexibility of the right shoulder.  For this I recommend a course of physiotherapy to work on progressive 4 quadrant stretching.  Once full flexibility has been  achieved, he may progress to strengthening exercises.  The goal would be able to restore him to his preinjury state acknowledging that his rotator cuff tendons will never be able to be repaired.  I did explain that there are surgical procedures that can address these issues such as arthroscopic debridement, partial cuff repair and possible superior capsular reconstruction.  However I do not feel that this type of surgery is in his best interest at the present time.  I recommend a course of physiotherapy and return to clinic in approximately 4 months with no imaging for repeat evaluation consideration of a repeat corticosteroid injection at that time.      At the conclusion of the office visit, Michael verbally acknowledged that I answered all of his questions satisfactorily.    Again, thank you for allowing me to participate in the care of your patient.        Sincerely,        Kirill Valdez MD

## 2020-01-08 ENCOUNTER — OFFICE VISIT (OUTPATIENT)
Dept: FAMILY MEDICINE | Facility: CLINIC | Age: 55
End: 2020-01-08
Payer: COMMERCIAL

## 2020-01-08 VITALS
SYSTOLIC BLOOD PRESSURE: 130 MMHG | OXYGEN SATURATION: 90 % | HEIGHT: 71 IN | WEIGHT: 315 LBS | BODY MASS INDEX: 44.1 KG/M2 | TEMPERATURE: 98 F | DIASTOLIC BLOOD PRESSURE: 88 MMHG | HEART RATE: 72 BPM

## 2020-01-08 DIAGNOSIS — Z11.4 SCREENING FOR HIV (HUMAN IMMUNODEFICIENCY VIRUS): ICD-10-CM

## 2020-01-08 DIAGNOSIS — Z13.6 CARDIOVASCULAR SCREENING; LDL GOAL LESS THAN 100: ICD-10-CM

## 2020-01-08 DIAGNOSIS — F10.10 ALCOHOL ABUSE: ICD-10-CM

## 2020-01-08 DIAGNOSIS — I10 ESSENTIAL HYPERTENSION WITH GOAL BLOOD PRESSURE LESS THAN 140/90: ICD-10-CM

## 2020-01-08 DIAGNOSIS — S46.011D TRAUMATIC TEAR OF RIGHT ROTATOR CUFF, UNSPECIFIED TEAR EXTENT, SUBSEQUENT ENCOUNTER: ICD-10-CM

## 2020-01-08 DIAGNOSIS — I71.20 THORACIC AORTIC ANEURYSM WITHOUT RUPTURE (H): ICD-10-CM

## 2020-01-08 DIAGNOSIS — E66.01 MORBID (SEVERE) OBESITY DUE TO EXCESS CALORIES (H): ICD-10-CM

## 2020-01-08 DIAGNOSIS — R79.89 ELEVATED SERUM CREATININE: ICD-10-CM

## 2020-01-08 DIAGNOSIS — E03.9 HYPOTHYROIDISM, UNSPECIFIED TYPE: ICD-10-CM

## 2020-01-08 DIAGNOSIS — Z12.11 SCREEN FOR COLON CANCER: ICD-10-CM

## 2020-01-08 DIAGNOSIS — N52.9 VASCULOGENIC ERECTILE DYSFUNCTION, UNSPECIFIED VASCULOGENIC ERECTILE DYSFUNCTION TYPE: ICD-10-CM

## 2020-01-08 DIAGNOSIS — Z86.79 S/P ASCENDING AORTIC ANEURYSM REPAIR: ICD-10-CM

## 2020-01-08 DIAGNOSIS — M1A.09X0 CHRONIC GOUT OF MULTIPLE SITES, UNSPECIFIED CAUSE: ICD-10-CM

## 2020-01-08 DIAGNOSIS — Z23 NEEDS FLU SHOT: ICD-10-CM

## 2020-01-08 DIAGNOSIS — G47.33 OSA (OBSTRUCTIVE SLEEP APNEA): ICD-10-CM

## 2020-01-08 DIAGNOSIS — Z98.890 S/P ASCENDING AORTIC ANEURYSM REPAIR: ICD-10-CM

## 2020-01-08 DIAGNOSIS — Z23 NEED FOR SHINGLES VACCINE: ICD-10-CM

## 2020-01-08 DIAGNOSIS — Z52.4 KIDNEY DONOR: ICD-10-CM

## 2020-01-08 DIAGNOSIS — K42.9 UMBILICAL HERNIA WITHOUT OBSTRUCTION AND WITHOUT GANGRENE: ICD-10-CM

## 2020-01-08 DIAGNOSIS — Z12.5 SCREENING FOR PROSTATE CANCER: ICD-10-CM

## 2020-01-08 DIAGNOSIS — Z00.01 ENCOUNTER FOR ROUTINE ADULT MEDICAL EXAM WITH ABNORMAL FINDINGS: Primary | ICD-10-CM

## 2020-01-08 DIAGNOSIS — F17.200 TOBACCO USE DISORDER: ICD-10-CM

## 2020-01-08 LAB
BASOPHILS # BLD AUTO: 0 10E9/L (ref 0–0.2)
BASOPHILS NFR BLD AUTO: 0.5 %
CRP SERPL-MCNC: <2.9 MG/L (ref 0–8)
DIFFERENTIAL METHOD BLD: NORMAL
EOSINOPHIL # BLD AUTO: 0.4 10E9/L (ref 0–0.7)
EOSINOPHIL NFR BLD AUTO: 4.8 %
ERYTHROCYTE [DISTWIDTH] IN BLOOD BY AUTOMATED COUNT: 13.1 % (ref 10–15)
ERYTHROCYTE [SEDIMENTATION RATE] IN BLOOD BY WESTERGREN METHOD: 6 MM/H (ref 0–20)
HCT VFR BLD AUTO: 45.8 % (ref 40–53)
HGB BLD-MCNC: 14.6 G/DL (ref 13.3–17.7)
LYMPHOCYTES # BLD AUTO: 2.2 10E9/L (ref 0.8–5.3)
LYMPHOCYTES NFR BLD AUTO: 28.9 %
MCH RBC QN AUTO: 29 PG (ref 26.5–33)
MCHC RBC AUTO-ENTMCNC: 31.9 G/DL (ref 31.5–36.5)
MCV RBC AUTO: 91 FL (ref 78–100)
MONOCYTES # BLD AUTO: 1 10E9/L (ref 0–1.3)
MONOCYTES NFR BLD AUTO: 12.9 %
NEUTROPHILS # BLD AUTO: 4 10E9/L (ref 1.6–8.3)
NEUTROPHILS NFR BLD AUTO: 52.9 %
PLATELET # BLD AUTO: 206 10E9/L (ref 150–450)
RBC # BLD AUTO: 5.04 10E12/L (ref 4.4–5.9)
T3 SERPL-MCNC: 94 NG/DL (ref 60–181)
WBC # BLD AUTO: 7.5 10E9/L (ref 4–11)

## 2020-01-08 PROCEDURE — 82043 UR ALBUMIN QUANTITATIVE: CPT | Performed by: FAMILY MEDICINE

## 2020-01-08 PROCEDURE — 99396 PREV VISIT EST AGE 40-64: CPT | Mod: 25 | Performed by: FAMILY MEDICINE

## 2020-01-08 PROCEDURE — 80061 LIPID PANEL: CPT | Performed by: FAMILY MEDICINE

## 2020-01-08 PROCEDURE — 36415 COLL VENOUS BLD VENIPUNCTURE: CPT | Performed by: FAMILY MEDICINE

## 2020-01-08 PROCEDURE — G0103 PSA SCREENING: HCPCS | Performed by: FAMILY MEDICINE

## 2020-01-08 PROCEDURE — 84480 ASSAY TRIIODOTHYRONINE (T3): CPT | Performed by: FAMILY MEDICINE

## 2020-01-08 PROCEDURE — 90471 IMMUNIZATION ADMIN: CPT | Performed by: FAMILY MEDICINE

## 2020-01-08 PROCEDURE — 85652 RBC SED RATE AUTOMATED: CPT | Performed by: FAMILY MEDICINE

## 2020-01-08 PROCEDURE — 90682 RIV4 VACC RECOMBINANT DNA IM: CPT | Performed by: FAMILY MEDICINE

## 2020-01-08 PROCEDURE — 80050 GENERAL HEALTH PANEL: CPT | Performed by: FAMILY MEDICINE

## 2020-01-08 PROCEDURE — 86140 C-REACTIVE PROTEIN: CPT | Performed by: FAMILY MEDICINE

## 2020-01-08 PROCEDURE — 84439 ASSAY OF FREE THYROXINE: CPT | Performed by: FAMILY MEDICINE

## 2020-01-08 PROCEDURE — 87389 HIV-1 AG W/HIV-1&-2 AB AG IA: CPT | Performed by: FAMILY MEDICINE

## 2020-01-08 PROCEDURE — 99214 OFFICE O/P EST MOD 30 MIN: CPT | Mod: 25 | Performed by: FAMILY MEDICINE

## 2020-01-08 RX ORDER — CARVEDILOL 25 MG/1
TABLET ORAL
Qty: 180 TABLET | Refills: 3 | Status: SHIPPED | OUTPATIENT
Start: 2020-01-08

## 2020-01-08 RX ORDER — SILDENAFIL 50 MG/1
50 TABLET, FILM COATED ORAL DAILY PRN
Qty: 12 TABLET | Refills: 3 | Status: SHIPPED | OUTPATIENT
Start: 2020-01-08

## 2020-01-08 RX ORDER — LISINOPRIL 20 MG/1
TABLET ORAL
Qty: 90 TABLET | Refills: 1 | OUTPATIENT
Start: 2020-01-08

## 2020-01-08 RX ORDER — LEVOTHYROXINE SODIUM 75 UG/1
75 TABLET ORAL DAILY
Qty: 90 TABLET | Refills: 3 | Status: SHIPPED | OUTPATIENT
Start: 2020-01-08

## 2020-01-08 RX ORDER — LISINOPRIL 20 MG/1
TABLET ORAL
Qty: 90 TABLET | Refills: 1 | Status: SHIPPED | OUTPATIENT
Start: 2020-01-08 | End: 2020-09-21

## 2020-01-08 ASSESSMENT — MIFFLIN-ST. JEOR: SCORE: 2418.64

## 2020-01-08 NOTE — PROGRESS NOTES
SUBJECTIVE:   CC: Michael Esteban is an 54 year old male who presents for preventative health visit.     HPI  {Add if <65 person on Medicare  - Required Questions (Optional):195140}  {Outside tests to abstract? :767645}    {additional problems to add (Optional):917804}    Today's PHQ-2 Score:   PHQ-2 ( 1999 [employer]) 11/15/2018   Q1: Little interest or pleasure in doing things 1   Q2: Feeling down, depressed or hopeless 1   PHQ-2 Score 2   Q1: Little interest or pleasure in doing things -   Q2: Feeling down, depressed or hopeless -   PHQ-2 Score -       Abuse: Current or Past(Physical, Sexual or Emotional)- { :510130}  Do you feel safe in your environment? { :051193}    Have you ever done Advance Care Planning? (For example, a Health Directive, POLST, or a discussion with a medical provider or your loved ones about your wishes): { :841012}    Social History     Tobacco Use     Smoking status: Current Some Day Smoker     Types: Pipe, Cigars     Smokeless tobacco: Never Used     Tobacco comment: strongly encourage full cessation secondary to TAA. Occ. cigar    Substance Use Topics     Alcohol use: Yes     Alcohol/week: 0.0 standard drinks     Comment: couple of beers each week     {Rooming Staff- Complete this question if Prescreen response is not shown below for today's visit. If you drink alcohol do you typically have >3 drinks per day or >7 drinks per week? (Optional):296684}    No flowsheet data found.{add AUDIT responses (Optional) (A score of 7 for adult men is an indication of hazardous drinking; a score of 8 or more is an indication of an alcohol use disorder.  A score of 7 or more for adult women is an indication of hazardous drinking or an alchohol use disorder):219365}    Last PSA:   PSA   Date Value Ref Range Status   01/15/2018 1.43 0 - 4 ug/L Final     Comment:     Assay Method:  Chemiluminescence using Siemens Vista analyzer       Reviewed orders with patient. Reviewed health maintenance and updated  "orders accordingly - { :804658::\"Yes\"}  {Chronicprobdata (optional):810438}    Reviewed and updated as needed this visit by clinical staff         Reviewed and updated as needed this visit by Provider        {HISTORY OPTIONS (Optional):564841}    Review of Systems  {MALE ROS (Optional):891862::\"CONSTITUTIONAL: NEGATIVE for fever, chills, change in weight\",\"INTEGUMENTARY/SKIN: NEGATIVE for worrisome rashes, moles or lesions\",\"EYES: NEGATIVE for vision changes or irritation\",\"ENT: NEGATIVE for ear, mouth and throat problems\",\"RESP: NEGATIVE for significant cough or SOB\",\"CV: NEGATIVE for chest pain, palpitations or peripheral edema\",\"GI: NEGATIVE for nausea, abdominal pain, heartburn, or change in bowel habits\",\" male: negative for dysuria, hematuria, decreased urinary stream, erectile dysfunction, urethral discharge\",\"MUSCULOSKELETAL: NEGATIVE for significant arthralgias or myalgia\",\"NEURO: NEGATIVE for weakness, dizziness or paresthesias\",\"PSYCHIATRIC: NEGATIVE for changes in mood or affect\"}    OBJECTIVE:   There were no vitals taken for this visit.    Physical Exam  {Exam Choices (Optional):641720}    {Diagnostic Test Results (Optional):784633::\"Diagnostic Test Results:\",\"Labs reviewed in Epic\"}    ASSESSMENT/PLAN:   {Diag Picklist:890326}    COUNSELING:   {MALE COUNSELING MESSAGES:482932::\"Reviewed preventive health counseling, as reflected in patient instructions\"}    Estimated body mass index is 47.14 kg/m  as calculated from the following:    Height as of 12/27/19: 1.803 m (5' 11\").    Weight as of 12/27/19: 153.3 kg (338 lb).     {Weight Management Plan (ACO) Complete if BMI is abnormal-  Ages 18-64  BMI >24.9.  Age 65+ with BMI <23 or >30 (Optional):974133}     reports that he has been smoking pipe and cigars. He has never used smokeless tobacco.  {Tobacco Cessation -- Complete if patient is a smoker (Optional):403749}    Counseling Resources:  ATP IV Guidelines  Pooled Cohorts Equation Calculator  FRAX " Risk Assessment  ICSI Preventive Guidelines  Dietary Guidelines for Americans, 2010  USDA's MyPlate  ASA Prophylaxis  Lung CA Screening    Laura Austin MD  [facility]

## 2020-01-08 NOTE — TELEPHONE ENCOUNTER
"Requested Prescriptions   Pending Prescriptions Disp Refills     lisinopril (PRINIVIL/ZESTRIL) 20 MG tablet [Pharmacy Med Name: LISINOPRIL 20 MG TABLET]      Last Written Prescription Date:  11.15.18  Last Fill Quantity: 90 tablet,  # refills: 1   Last office visit: 11/25/2019 with prescribing provider:  Laura Austin MD           Future Office Visit:   Next 5 appointments (look out 90 days)    Jan 08, 2020 10:20 AM CST  PHYSICAL with Laura Austin MD  [facility] [address] [place] [id number] [phone]            90 tablet 1     Sig: TAKE ONE TABLET BY MOUTH DAILY.       ACE Inhibitors (Including Combos) Protocol Failed - 1/8/2020  2:38 AM        Failed - Normal serum creatinine on file in past 12 months     Recent Labs   Lab Test 05/07/18 04/21/17  1445   CR 1.050   < >  --    CREAT  --   --  1.1    < > = values in this interval not displayed.             Failed - Normal serum potassium on file in past 12 months     Recent Labs   Lab Test 01/15/18  1717   POTASSIUM 5.0             Passed - Blood pressure under 140/90 in past 12 months     BP Readings from Last 3 Encounters:   12/27/19 130/82   12/02/19 (!) 146/96   11/25/19 110/70                 Passed - Recent (12 mo) or future (30 days) visit within the authorizing provider's specialty     Patient has had an office visit with the authorizing provider or a provider within the authorizing providers department within the previous 12 mos or has a future within next 30 days. See \"Patient Info\" tab in inbasket, or \"Choose Columns\" in Meds & Orders section of the refill encounter.              Passed - Medication is active on med list        Passed - Patient is age 18 or older        "

## 2020-01-08 NOTE — PROGRESS NOTES
3  SUBJECTIVE:   CC: Michael Esteban is an 54 year old male who presents for preventive health visit.     Healthy Habits:    Do you get at least three servings of calcium containing foods daily (dairy, green leafy vegetables, etc.)? yes    Amount of exercise or daily activities, outside of work: no, work is active    Problems taking medications regularly No    Medication side effects: No    Have you had an eye exam in the past two years? no    Do you see a dentist twice per year? no    Do you have sleep apnea, excessive snoring or daytime drowsiness?yes cpap      Hyperlipidemia Follow-Up      Are you regularly taking any medication or supplement to lower your cholesterol?   Yes- lipitor    Are you having muscle aches or other side effects that you think could be caused by your cholesterol lowering medication?  No    Recent Labs   Lab Test 05/08/17  1136 03/18/16  0811 01/08/15  1217 09/09/14  0731   CHOL 76 98 156 137   HDL 30* 30* 39* 41   LDL 31 43 95 75   TRIG 73 127 110 107   CHOLHDLRATIO  --   --  4.0 3.3       Hypertension Follow-up      Do you check your blood pressure regularly outside of the clinic? No     Are you following a low salt diet? No    Are your blood pressures ever more than 140 on the top number (systolic) OR more   than 90 on the bottom number (diastolic), for example 140/90?  Does mot check bp    BP Readings from Last 3 Encounters:   01/08/20 130/88   12/27/19 130/82   12/02/19 (!) 146/96     Creatinine   Date Value Ref Range Status   05/07/2018 1.050 0.500 - 1.300 mg/dL Final   01/15/2018 1.35 (H) 0.66 - 1.25 mg/dL Final   05/08/2017 1.15 0.66 - 1.25 mg/dL Final   08/22/2016 1.27 (H) 0.66 - 1.25 mg/dL Final   08/18/2016 1.61 (H) 0.66 - 1.25 mg/dL Final     Wt Readings from Last 5 Encounters:   01/08/20 (!) 156.9 kg (346 lb)   12/27/19 (!) 153.3 kg (338 lb)   12/02/19 (!) 153.3 kg (338 lb)   11/25/19 (!) 153.3 kg (338 lb)   11/15/18 (!) 155.8 kg (343 lb 6.4 oz)       Today's PHQ-2  "Score:   PHQ-2 ( 1999 Pf[employer]) 11/15/2018 1/6/2016   Q1: Little interest or pleasure in doing things 1 0   Q2: Feeling down, depressed or hopeless 1 0   PHQ-2 Score 2 0   Q1: Little interest or pleasure in doing things - -   Q2: Feeling down, depressed or hopeless - -   PHQ-2 Score - -       Hypothyroidism  TSH   Date Value Ref Range Status   01/15/2018 1.90 0.40 - 4.00 mU/L Final     T4 Free   Date Value Ref Range Status   01/15/2018 0.93 0.76 - 1.46 ng/dL Final     Teared Rotator Cuff:  Patient reports he was in a accident and dislocated his right shoulder.  MRI demonstrated tear of the rotator cuff. Managed by Dr. Valdez for his symptoms. Reports he started PT on 1/7/19.     Thoracic Aortic Aneurysm  Last time seen with Cardiology Dr Tipton in 2017. Performed an angiogram showed only mild plaque in the proximal RCA without any evidence of obstructive coronary artery disease. Recommended patient follow up with cardiology as PRN.     Arthralgia of lower leg:  Follows with Dr. [facility] at [facility] for his symptoms.    Umbilical Hernia:  Leblanc still \"pop\" out at time but is not painful. No acute concerns.    Alcohol Abuse:  Not currently drinking     Smoking:  Cut down on smoking weed and cigar.       Abuse: Current or Past(Physical, Sexual or Emotional)- No  Do you feel safe in your environment? Yes    Have you ever done Advance Care Planning? (For example, a Health Directive, POLST, or a discussion with a medical provider or your loved ones about your wishes): patient declined    Social History     Tobacco Use     Smoking status: Current Some Day Smoker     Types: Pipe, Cigars     Smokeless tobacco: Never Used     Tobacco comment: strongly encourage full cessation secondary to TAA. Occ. cigar    Substance Use Topics     Alcohol use: Yes     Alcohol/week: 0.0 standard drinks     Comment: couple of beers each week     If you drink alcohol do you typically have >3 drinks per day or >7 " "drinks per week? No                      Last PSA:   PSA   Date Value Ref Range Status   01/15/2018 1.43 0 - 4 ug/L Final     Comment:     Assay Method:  Chemiluminescence using Siemens Vista analyzer       Reviewed orders with patient. Reviewed health maintenance and updated orders accordingly - Yes  Lab work is in process    Reviewed and updated as needed this visit by clinical staff  Tobacco  Allergies  Meds  Med Hx  Surg Hx  Fam Hx  Soc Hx        Reviewed and updated as needed this visit by Provider      ROS:  CONSTITUTIONAL: NEGATIVE for fever, chills, change in weight  INTEGUMENTARY/SKIN: NEGATIVE for worrisome rashes, moles or lesions  EYES: NEGATIVE for vision changes or irritation  ENT: NEGATIVE for ear, mouth and throat problems  RESP: NEGATIVE for significant cough or SOB  CV: NEGATIVE for chest pain, palpitations or peripheral edema  GI: NEGATIVE for nausea, abdominal pain, heartburn, or change in bowel habits   male: negative for dysuria, hematuria, decreased urinary stream, erectile dysfunction, urethral discharge  MUSCULOSKELETAL: NEGATIVE for significant arthralgias or myalgia  NEURO: NEGATIVE for weakness, dizziness or paresthesias  PSYCHIATRIC: NEGATIVE for changes in mood or affect  This document serves as a record of the services and decisions personally performed and made by Laura Austin MD. It was created on her behalf by Milagros Lewis, a trained medical scribe. The creation of this document is based on the provider's statements to the medical scribe.  Milagros Lewis January 8, 2020  OBJECTIVE:   /88   Pulse 72   Temp 98  F (36.7  C)   Ht 1.791 m (5' 10.5\")   Wt (!) 156.9 kg (346 lb)   SpO2 90%   BMI 48.94 kg/m    EXAM:  GENERAL: healthy, alert and no distress  EYES: Eyes grossly normal to inspection, PERRL and conjunctivae and sclerae normal  HENT: ear canals and TM's normal, nose and mouth without ulcers or lesions  NECK: no adenopathy, no asymmetry, masses, or scars " and thyroid normal to palpation  RESP: lungs clear to auscultation - no rales, rhonchi or wheezes  CV: regular rate and rhythm, normal S1 S2, no S3 or S4, no murmur, click or rub, no peripheral edema and peripheral pulses strong  ABDOMEN: Abdominal muscle diastasis along with fingertip umbilical hernia easily reducible. Otherwise, soft, nontender, no hepatosplenomegaly, no masses and bowel sounds normal  MS: no gross musculoskeletal defects noted, no edema  SKIN: no suspicious lesions or rashes  NEURO: Normal strength and tone, mentation intact and speech normal  PSYCH: mentation appears normal, affect normal/bright  Patient declines genital and rectal exam today  Diagnostic Test Results:  Labs reviewed in Epic  No results found for this or any previous visit (from the past 24 hour(s)).    ASSESSMENT/PLAN:   Michael was seen today for physical.    Diagnoses and all orders for this visit:    ICD-10-CM    1. Encounter for routine adult medical exam with abnormal findings Z00.01    2. CARDIOVASCULAR SCREENING; LDL GOAL LESS THAN 100- secondary to hx of TAA repair- needs labs - ordered  Z13.6 Lipid panel reflex to direct LDL Fasting   3. Essential hypertension with goal blood pressure less than 140/90- needs labs and med refills  I10 Lipid panel reflex to direct LDL Fasting     Albumin Random Urine Quantitative with Creat Ratio     Comprehensive metabolic panel     CBC with platelets differential     OFFICE/OUTPT VISIT,EST,LEVL IV   4. history of Thoracic aortic aneurysm without rupture - 4.7 cm on 9/8/2014 increased to 5.2 cm 1/2015 - repaired with gortex graft 2/17/2015 - need to ensure bp and lipids well controlled  I71.2 Lipid panel reflex to direct LDL Fasting     carvedilol (COREG) 25 MG tablet     lisinopril (PRINIVIL/ZESTRIL) 20 MG tablet   5. Alcohol abuse - cut down  s/p TAA repair 2/27/2015 - down to 2-3 beers/week F10.10 OFFICE/OUTPT VISIT,EST,LEVL IV   6. Chronic gout of multiple sites, unspecified  cause - sees Dr. Hope - [facility]  [id number].09X0 CRP inflammation     Erythrocyte sedimentation rate auto     OFFICE/OUTPT VISIT,EST,LEVL IV   7. Hypothyroidism, unspecified type- needs labs and med refills  E03.9 T3 total     TSH     T4 FREE     levothyroxine (SYNTHROID/LEVOTHROID) 75 MCG tablet     OFFICE/OUTPT VISIT,EST,LEVL IV   8. Elevated serum creatinine- needs labs rechecked  R79.89 OFFICE/OUTPT VISIT,EST,LEVL IV   9. Morbid (severe) obesity due to excess calories -- (H)once again had a beth discussion with pt re: need for weight loss and  E66.01 OFFICE/OUTPT VISIT,EST,LEVL IV   10. Traumatic tear of right rotator cuff, unspecified tear extent, subsequent encounter- Dr. Kirill Valdez - [facility]  S46.011D OFFICE/OUTPT VISIT,EST,LEVL IV   11. BERNADETTE (obstructive sleep apnea) G47.33 OFFICE/OUTPT VISIT,EST,LEVL IV   12. Tobacco use disorder- pipe/cigar -started smoking cigars/pipe again 6/2015 F17.200 OFFICE/OUTPT VISIT,EST,LEVL IV   13. Need for shingles vaccine Z23 zoster vaccine recombinant adjuvanted (SHINGRIX) injection   14. Needs flu shot Z23 C RIV4 (FLUBLOK) VACCINE RECOMBINANT DNA PRSRV ANTIBIO FREE, IM [[id number]]     VACCINE ADMINISTRATION, INITIAL   15. Umbilical hernia without obstruction and without gangrene K42.9 OFFICE/OUTPT VISIT,EST,LEVL IV   16. Screen for colon cancer Z12.11 Fecal colorectal cancer screen (FIT)   17. Screening for prostate cancer Z12.5 PROSTATE SPEC ANTIGEN SCREEN   18. S/P ascending aortic aneurysm repair Z98.890     Z86.79    19. Kidney donor Z52.4 OFFICE/OUTPT VISIT,EST,LEVL IV   20. Screening for HIV (human immunodeficiency virus) Z11.4 HIV Screening   21. Essential hypertension with goal blood pressure less than 140/90- not well controlled today  I10 carvedilol (COREG) 25 MG tablet     lisinopril (PRINIVIL/ZESTRIL) 20 MG tablet   22. Vasculogenic erectile dysfunction, unspecified vasculogenic erectile dysfunction type N52.9 sildenafil  (VIAGRA) 50 MG tablet     OFFICE/OUTPT VISIT,SAMANTHA VALVERDE IV        Encounter for routine adult medical exam with abnormal findings   54 year old male. Discussed a well-balanced diet and regular exercise.       history of Thoracic aortic aneurysm without rupture - 4.7 cm on 9/8/2014 increased to 5.2 cm 1/2015 - repaired with gortex graft 2/17/2015   CARDIOVASCULAR SCREENING; LDL GOAL LESS THAN 100- secondary to hx of TAA repair  S/P ascending aortic aneurysm repair  Was managed with Dr. Tipton. Recommend he follow up as PRN for his Aortic Aneurysm.  -     Lipid panel reflex to direct LDL Fasting  -     carvedilol (COREG) 25 MG tablet; TAKE 1 TABLET BY MOUTH TWICE A DAY WITH MEALS  -     lisinopril (PRINIVIL/ZESTRIL) 20 MG tablet; Take 1 tablet (20 mg) by mouth daily    Alcohol abuse - cut down  s/p TAA repair 2/27/2015 - down to 2-3 beers/week  Not consuming alcohol.    Chronic gout of multiple sites, unspecified cause - sees Dr. [facility] - [facility]   Managed by Dr. Hope.  -     CRP inflammation  -     Erythrocyte sedimentation rate auto    Hypothyroidism, unspecified type  Will check labs. Continue 75 mch of Levothyroxine.  -     T3 total  -     TSH  -     T4 FREE  -     levothyroxine (SYNTHROID/LEVOTHROID) 75 MCG tablet; Take 1 tablet (75 mcg) by mouth daily    Elevated serum creatinine    Morbid (severe) obesity due to excess calories (H)  Diet/Exercise efforts encouraged. Strongly recommended help with weight loss - medifast, weight watchers, nutrisystem, ideal protein, or  overeaters anonymous - HOW program.     Traumatic tear of right rotator cuff, unspecified tear extent, subsequent encounter- Dr. Kirill Valdez - [facility]   Managed by Dr. Valdez. Started PT on 01/7/19.    BERNADETTE (obstructive sleep apnea)  No acute concerns.    Tobacco use disorder- pipe/cigar -started smoking cigars/pipe again 6/2015  Reduced his pipe/cigar consumption.    Need for shingles  "vaccine  Patient to consider.  -     zoster vaccine recombinant adjuvanted (SHINGRIX) injection; Inject 0.5 mLs into the muscle once for 1 dose    Needs flu shot  Administered today in clinic by MA.  -     BRYCE RIV4 (FLUBLOK) VACCINE RECOMBINANT DNA PRSRV ANTIBIO FREE, IM [[id number]]  -     VACCINE ADMINISTRATION, INITIAL    Umbilical hernia without obstruction and without gangrene  Will have some occurrence of hernia but no pain associated. No acute concerns.    Screen for colon cancer  Routine screening.   -     Fecal colorectal cancer screen (FIT); Future    Screening for prostate cancer  Routine screening.   -     PROSTATE SPEC ANTIGEN SCREEN    Kidney donor    Screening for HIV (human immunodeficiency virus)  Routine screening.   -     HIV Screening    Essential hypertension with goal blood pressure less than 140/90- not well controlled today  Suboptimally controlled. Recommend he continue regimen.   -     Lipid panel reflex to direct LDL Fasting  -     Albumin Random Urine Quantitative with Creat Ratio  -     Comprehensive metabolic panel  -     CBC with platelets differential  -     carvedilol (COREG) 25 MG tablet; TAKE 1 TABLET BY MOUTH TWICE A DAY WITH MEALS  -     lisinopril (PRINIVIL/ZESTRIL) 20 MG tablet; Take 1 tablet (20 mg) by mouth daily    Vasculogenic erectile dysfunction, unspecified vasculogenic erectile dysfunction type  Not helpful. Patient not taking.  -     sildenafil (VIAGRA) 50 MG tablet; Take 1 tablet (50 mg) by mouth daily as needed (for erectile dysfunction) 30 min to 4 hrs before sex. Do not use with nitroglycerin, terazosin or doxazosin.      COUNSELING:  Reviewed preventive health counseling, as reflected in patient instructions       Regular exercise       Healthy diet/nutrition       Vision screening       Hearing screening    Estimated body mass index is 48.94 kg/m  as calculated from the following:    Height as of this encounter: 1.791 m (5' 10.5\").    Weight as of this encounter: 156.9 " kg (346 lb).     reports that he has been smoking pipe and cigars. He has never used smokeless tobacco.  Tobacco Cessation Action Plan: Information offered: Patient not interested at this time   Had very beth discussion with pt that he is increasing his risk of recurrence of aortic dilation/vascular disease, occlusion MI and aneurysm rupture with smoking.     Counseling Resources:  ATP IV Guidelines  Pooled Cohorts Equation Calculator  FRAX Risk Assessment  ICSI Preventive Guidelines  Dietary Guidelines for Americans, 2010  USDA's MyPlate  ASA Prophylaxis  Lung CA Screening    Laura Austin MD  [facility]

## 2020-01-09 LAB
ALBUMIN SERPL-MCNC: 3.5 G/DL (ref 3.4–5)
ALP SERPL-CCNC: 63 U/L (ref 40–150)
ALT SERPL W P-5'-P-CCNC: 39 U/L (ref 0–70)
ANION GAP SERPL CALCULATED.3IONS-SCNC: 8 MMOL/L (ref 3–14)
AST SERPL W P-5'-P-CCNC: 17 U/L (ref 0–45)
BILIRUB SERPL-MCNC: 0.5 MG/DL (ref 0.2–1.3)
BUN SERPL-MCNC: 17 MG/DL (ref 7–30)
CALCIUM SERPL-MCNC: 8.7 MG/DL (ref 8.5–10.1)
CHLORIDE SERPL-SCNC: 106 MMOL/L (ref 94–109)
CHOLEST SERPL-MCNC: 144 MG/DL
CO2 SERPL-SCNC: 26 MMOL/L (ref 20–32)
CREAT SERPL-MCNC: 1.13 MG/DL (ref 0.66–1.25)
CREAT UR-MCNC: 96 MG/DL
GFR SERPL CREATININE-BSD FRML MDRD: 72 ML/MIN/{1.73_M2}
GLUCOSE SERPL-MCNC: 101 MG/DL (ref 70–99)
HDLC SERPL-MCNC: 35 MG/DL
HIV 1+2 AB+HIV1 P24 AG SERPL QL IA: NONREACTIVE
LDLC SERPL CALC-MCNC: 80 MG/DL
MICROALBUMIN UR-MCNC: 49 MG/L
MICROALBUMIN/CREAT UR: 50.83 MG/G CR (ref 0–17)
NONHDLC SERPL-MCNC: 109 MG/DL
POTASSIUM SERPL-SCNC: 4.5 MMOL/L (ref 3.4–5.3)
PROT SERPL-MCNC: 6.9 G/DL (ref 6.8–8.8)
PSA SERPL-ACNC: 1.62 UG/L (ref 0–4)
SODIUM SERPL-SCNC: 140 MMOL/L (ref 133–144)
T4 FREE SERPL-MCNC: 0.94 NG/DL (ref 0.76–1.46)
TRIGL SERPL-MCNC: 147 MG/DL
TSH SERPL DL<=0.005 MIU/L-ACNC: 2.4 MU/L (ref 0.4–4)

## 2020-01-15 ENCOUNTER — TELEPHONE (OUTPATIENT)
Dept: ORTHOPEDICS | Facility: CLINIC | Age: 55
End: 2020-01-15

## 2020-01-15 NOTE — TELEPHONE ENCOUNTER
Call to patient to reschedule follow-up appointment with Dr. Valdez on 04/21/2020. Patient states that he is feeling mild improvement of right shoulder symptoms following cortisone injection with Dr. Macias on 12/23/2019. He notes decreased glenohumeral remains but PT has slightly helped. Patient also states that he is experiencing slight left shoulder discomfort, he believes to be caused by compensation.    Explained to patient that Dr. Valdez will be out of office the week of 04/20/2020. Patient was understanding and rescheduled follow-up appointment for 04/28/2020 at 9:00AM. Patient was appreciative for the call.    Closing encounter.      Candelario May ATC

## 2020-02-05 DIAGNOSIS — I71.20 THORACIC AORTIC ANEURYSM WITHOUT RUPTURE (H): ICD-10-CM

## 2020-02-05 RX ORDER — ATORVASTATIN CALCIUM 40 MG/1
TABLET, FILM COATED ORAL
Qty: 90 TABLET | Refills: 3 | Status: SHIPPED | OUTPATIENT
Start: 2020-02-05

## 2020-02-05 NOTE — TELEPHONE ENCOUNTER
"Requested Prescriptions   Pending Prescriptions Disp Refills     atorvastatin (LIPITOR) 40 MG tablet [Pharmacy Med Name: ATORVASTATIN 40 MG TABLET]          Last Written Prescription Date:  12.9.19  Last Fill Quantity: 60 tablet,  # refills: 0   Last office visit: 1/8/2020 with prescribing provider:  Laura Austin MD         Future Office Visit:       30 tablet 1     Sig: TAKE 1 TABLET (40 MG) BY MOUTH AT BEDTIME FOR CHOLESTEROL       Statins Protocol Passed - 2/5/2020  9:31 AM        Passed - LDL on file in past 12 months     Recent Labs   Lab Test 01/08/20  1148   LDL 80             Passed - No abnormal creatine kinase in past 12 months     No lab results found.             Passed - Recent (12 mo) or future (30 days) visit within the authorizing provider's specialty     Patient has had an office visit with the authorizing provider or a provider within the authorizing providers department within the previous 12 mos or has a future within next 30 days. See \"Patient Info\" tab in inbasket, or \"Choose Columns\" in Meds & Orders section of the refill encounter.              Passed - Medication is active on med list        Passed - Patient is age 18 or older        "

## 2020-03-04 ENCOUNTER — OFFICE VISIT (OUTPATIENT)
Dept: SLEEP MEDICINE | Facility: CLINIC | Age: 55
End: 2020-03-04
Payer: COMMERCIAL

## 2020-03-04 VITALS
SYSTOLIC BLOOD PRESSURE: 137 MMHG | RESPIRATION RATE: 16 BRPM | DIASTOLIC BLOOD PRESSURE: 96 MMHG | OXYGEN SATURATION: 94 % | BODY MASS INDEX: 44.1 KG/M2 | HEART RATE: 69 BPM | WEIGHT: 315 LBS | HEIGHT: 71 IN

## 2020-03-04 DIAGNOSIS — G47.33 OSA (OBSTRUCTIVE SLEEP APNEA): Primary | ICD-10-CM

## 2020-03-04 PROCEDURE — 99204 OFFICE O/P NEW MOD 45 MIN: CPT | Performed by: PHYSICIAN ASSISTANT

## 2020-03-04 ASSESSMENT — MIFFLIN-ST. JEOR: SCORE: 2509.36

## 2020-03-04 NOTE — PROGRESS NOTES
Sleep Consultation:    Date on this visit: 3/4/2020    Michael Esteban is sent by No ref. provider found for a sleep consultation regarding BERNADETTE.    Primary Physician: Laura Austinmarcelo Esteban presents for management of severe BERNADETTE initially diagnosed in 2008. He was initially seen for  always feeling tired for decades. His medical history is significant for HTN, obesity, gout, kidney donor, thoracic aortic aneurysm, hypothyroidism.     Most recent PSG 10/15/2015: AHI was 99.8/hr, with significant desaturations down to 76%. He spent 42.5 minutes below 90% SpO2 and 35.4 minutes below 89%. RDI 99.8/hr. REM RDI N/A. Supine RDI 65/hr. PLMI 0/hour. His TCM ranged from 44 to 52 mmHg, not indicative of hypoventilation.    He is on auto CPAP 13-16 cm. He says he loves CPAP. He feels well rested with 6-8 hours of sleep whereas he used to sleep 14+ hours on weekends. He had a sinus infection so did not use it for all of January. He has an AirFit P10 mask which he loves. He denies mask leak. He does not snore with CPAP. He denies dry nose or mouth. He is comfortable with the pressures.      The compliance data shows that the patient used the CPAP for 28/90 nights, 28% of nights for >4 hours.  The patient used the CPAP for 255/365 nights, 62% of nights for >4 hours. The 95th% pressure is 14.9 cm.  The 95th% leak is 16.2 lpm.  The average nightly usage is 6:53.  The average AHI is 0.8/hr.      Michael goes to sleep at 10:00 PM during the week. He wakes up at 4:00-8:00 AM with an alarm. He sometimes works overnights or evenings, so his sleep schedule varies when working. He falls asleep in 5 minutes.  Michael denies difficulty falling asleep.  He usually does not wake in the night. On weekends, his sleep schedule is variable.  Patient gets an average of 6-10 hours of sleep per night.     Patient does not use electronics in bed, watch TV in bed, worry in bed about anything and read in bed. He  does sometimes fall asleep in the recliner before going to bed.    Michael does do shift work.  He works rotating shifts.  He works a seasonal job in construction. He often works about 6 AM to 6 PM.  He is off of work November through April.  He lives with his wife and child.     Michael does have a regular bed partner. They never sleep separately.  Patient sleeps on his back and side. He denies morning headaches, morning confusion and restless legs. Ivaner denies any bruxism, sleep walking, sleep talking, dream enactment, sleep paralysis, cataplexy and hypnogogic/hypnopompic hallucinations.    Michael denies difficulty breathing through his nose, claustrophobia, reflux at night, heartburn and depression.      Michael has gained 25 pounds in the last 5 years. When off of work, he often stays up late watching TV and snacks and drinks more. Patient describes themself as neither a morning or night person.  He would prefer to go to sleep at 10:00 PM and wake up at 6:30 AM.  Patient's [place] Sleepiness score 6/24 inconsistent with daytime sleepiness.      Michael naps occasionally in the winter for  minutes (usually if he stays up too late watching TV), feels refreshed after naps. He takes no inadvertant naps.  He denies dozing while driving. Patient was counseled on the importance of driving while alert, to pull over if drowsy, or nap before getting into the vehicle if sleepy.  He uses 2-3 cups/day of coffee. Last caffeine intake is usually before noon.    Allergies:    Allergies   Allergen Reactions     Thalidomide      Reaction during stress test         Medications:    Current Outpatient Medications   Medication Sig Dispense Refill     acetaminophen (TYLENOL) 500 MG tablet Take 500 mg by mouth every 6 hours as needed for mild pain       allopurinol (ZYLOPRIM) 100 MG tablet TAKE 1 TABLET BY MOUTH EVERY DAY. DUE FOR AN OFFICE VISIT FOR DAVIDER REFILLS. 30 tablet 1     aspirin 81 MG  chewable tablet Take 1 tablet (81 mg) by mouth daily 90 tablet 3     atorvastatin (LIPITOR) 40 MG tablet TAKE 1 TABLET (40 MG) BY MOUTH AT BEDTIME FOR CHOLESTEROL 90 tablet 3     carvedilol (COREG) 25 MG tablet TAKE 1 TABLET BY MOUTH TWICE A DAY WITH MEALS 180 tablet 3     levothyroxine (SYNTHROID/LEVOTHROID) 75 MCG tablet Take 1 tablet (75 mcg) by mouth daily 90 tablet 3     lisinopril (PRINIVIL/ZESTRIL) 20 MG tablet Take 1 tablet (20 mg) by mouth daily 90 tablet 1     order for DME Equipment being ordered: RESMED AIRSENSE 10 WITH HH AND RESMED AIRFIT P10 PILLOW MASK.       sildenafil (VIAGRA) 50 MG tablet Take 1 tablet (50 mg) by mouth daily as needed (for erectile dysfunction) 30 min to 4 hrs before sex. Do not use with nitroglycerin, terazosin or doxazosin. 12 tablet 3       Problem List:  Patient Active Problem List    Diagnosis Date Noted     Umbilical hernia without obstruction and without gangrene 03/29/2016     Priority: Medium     Morbid (severe) obesity due to excess calories (H)      Priority: Medium     comorbid w/: Htn, Erectile dysfunction, Swelling of legs, Excessively sleep during the day, Awaken from sleep to catch breath, Shortness of Breath with activity, Loud snoring, Stop breathing while asleep.        Erectile dysfunction due to diseases classified elsewhere 01/13/2016     Priority: Medium     BERNADETTE (obstructive sleep apnea) 11/05/2015     Priority: Medium     Severe, AHI 99.8/hr. CPAP 13 cm effective laterally.       SOB (shortness of breath) 09/22/2015     Priority: Medium     CARDIOVASCULAR SCREENING; LDL GOAL LESS THAN 100- secondary to hx of TAA repair 04/24/2015     Priority: Medium     Fever 02/25/2015     Priority: Medium     S/P ascending aortic aneurysm repair 02/17/2015     Priority: Medium     Atypical chest pain 01/08/2015     Priority: Medium     Alcohol abuse - cut down  s/p TAA repair 2/27/2015 - down to 2-3 beers/week 01/08/2015     Priority: Medium     Vertigo 09/17/2014      Priority: Medium     Tobacco use disorder- pipe/cigar -started smoking cigars/pipe again 6/2015 09/17/2014     Priority: Medium     history of Thoracic aortic aneurysm without rupture - 4.7 cm on 9/8/2014 increased to 5.2 cm 1/2015 - repaired with gortex graft 2/17/2015       Priority: Medium     4.7 cm        Kidney donor      Priority: Medium     Overview:   LW Onset:  [id number]  donated Left kidney to mother in 1992       Essential hypertension with goal blood pressure less than 140/90      Priority: Medium     Chronic gout of multiple sites, unspecified cause - sees Dr. [facility]       Priority: Medium     Hypothyroidism, unspecified type      Priority: Medium     Pain in joint, ankle and foot 09/16/2004     Priority: Medium     Overview:   LW Onset:  [id number]       Arthralgia of lower leg 09/16/2004     Priority: Medium     Overview:   LW Onset:  [id number]          Past Medical/Surgical History:  Past Medical History:   Diagnosis Date     Gout      Hyperlipidemia      Hypertension      Hypothyroidism      Kidney donor 1992     donated Left kidney to mother in 1992     Morbid (severe) obesity due to excess calories (H)     comorbid w/: Htn, Erectile dysfunction, Swelling of legs, Excessively sleep during the day, Awaken from sleep to catch breath, Shortness of Breath with activity, Loud snoring, Stop breathing while asleep.      Thoracic aortic aneurysm without rupture (H)     4.7 cm - s/p repair 2/2015     Past Surgical History:   Procedure Laterality Date     ARTHROSCOPY KNEE Right 2005      COLONOSCOPY N/A 1/24/2017    Procedure: COLONOSCOPY;  Surgeon: Maikol Faulkner MD;  Location:  GI     REPAIR ANEURYSM ASCENDING AORTA N/A 2/17/2015    Procedure: REPAIR ANEURYSM ASCENDING AORTA;  Surgeon: Monae Lechuga MD;  Location: [facility] OR     REPAIR PATENT FORAMEN OVALE N/A 2/17/2015    Procedure: REPAIR PATENT FORAMEN OVALE;  Surgeon: Monae Lechuga MD;  Location: [facility] OR      s/p left nephrectomy - donor to mother  1992     TONSILLECTOMY  age 3        Social History:  Social History     Socioeconomic History     Marital status:      Spouse name: Dacia Marquis     Number of children: 1     Years of education: 12     Highest education level: Not on file   Occupational History     Occupation: local seasonal [employer]      Comment: [employer]    Social Needs     Financial resource strain: Not on file     Food insecurity:     Worry: Not on file     Inability: Not on file     Transportation needs:     Medical: Not on file     Non-medical: Not on file   Tobacco Use     Smoking status: Current Some Day Smoker     Types: Pipe, Cigars     Smokeless tobacco: Never Used     Tobacco comment: strongly encourage full cessation secondary to TAA. Occ. cigar    Substance and Sexual Activity     Alcohol use: Yes     Alcohol/week: 0.0 standard drinks     Comment: couple of beers each week     Drug use: No     Comment: used to smoke pot in the [place]      Sexual activity: Yes     Partners: Female     Comment:     Lifestyle     Physical activity:     Days per week: Not on file     Minutes per session: Not on file     Stress: Not on file   Relationships     Social connections:     Talks on phone: Not on file     Gets together: Not on file     Attends Pentec[employer]al service: Not on file     Active member of club or organization: Not on file     Attends meetings of clubs or organizations: Not on file     Relationship status: Not on file     Intimate partner violence:     Fear of current or ex partner: Not on file     Emotionally abused: Not on file     Physically abused: Not on file     Forced sexual activity: Not on file   Other Topics Concern     Parent/sibling w/ CABG, MI or angioplasty before 65F 55M? No      Service Not Asked     Blood Transfusions Not Asked     Caffeine Concern No     Comment: 0-1 a day     Occupational Exposure Not Asked     Hobby Hazards Not Asked      Sleep Concern Not Asked     Stress Concern Not Asked     Weight Concern Not Asked     Special Diet Not Asked     Back Care Not Asked     Exercise No     Bike Helmet Not Asked     Seat Belt Yes     Self-Exams Not Asked   Social History Narrative    SonPrudencio - isabela: 10/25/2003.                Family History:  Family History   Problem Relation Age of Onset     Hypertension Mother      Genitourinary Problems Mother         renal failure - lived for 17 years after transplant from pt      Cardiovascular Father         MI at age 59 - heavy smoker      Sleep Apnea Father      Cardiovascular Paternal Grandfather 62        TAA - rupture/      Hypertension Sister        Review of Systems:  A complete review of systems reviewed by me is negative with the exeption of what has been mentioned in the history of present illness.  CONSTITUTIONAL: NEGATIVE for weight loss, fever, chills, sweats or night sweats, drug allergies.  CONSTITUTIONAL:  POSITIVE for  weight gain  EYES: NEGATIVE for changes in vision, blind spots, double vision.  ENT: NEGATIVE for ear pain, sore throat, sinus pain, post-nasal drip, runny nose, bloody nose  CARDIAC: NEGATIVE for fast heartbeats or fluttering in chest, chest pain or pressure, breathlessness when lying flat.  CARDIAC:  POSITIVE for  swollen legs, swollen feet and HTN  NEUROLOGIC: NEGATIVE headaches, weakness or numbness in the arms or legs.  DERMATOLOGIC: NEGATIVE for rashes, new moles or change in mole(s)  PULMONARY: NEGATIVE SOB at rest, SOB with activity, dry cough, coughing up blood, wheezing or whistling when breathing.    PULMONARY:  POSITIVE for  productive cough  GASTROINTESTINAL: NEGATIVE for nausea or vomitting, loose or watery stools, fat or grease in stools, constipation, abdominal pain, bowel movements black in color or blood noted.  GENITOURINARY: NEGATIVE for pain during urination, blood in urine, urinating more frequently than usual, irregular menstrual  "periods.  MUSCULOSKELETAL: NEGATIVE for muscle pain, bone or joint pain, swollen joints.  ENDOCRINE: NEGATIVE for increased thirst or urination, diabetes.  LYMPHATIC: NEGATIVE for swollen lymph nodes, lumps or bumps in the breasts or nipple discharge.    Physical Examination:  Vitals: BP (!) 137/96   Pulse 69   Resp 16   Ht 1.791 m (5' 10.5\")   Wt (!) 166 kg (366 lb)   SpO2 94%   BMI 51.77 kg/m      Neck Cir (cm): 52 cm    [place] Total Score 3/4/2020   Total score - Salley 6       JAYLA Total Score: 0 (03/04/20 1052)    GENERAL APPEARANCE: healthy, alert, no distress and cooperative  EYES: Eyes grossly normal to inspection, PERRL, conjunctivae and sclerae normal and lids and lashes normal  HENT: nose and mouth without ulcers or lesions and oropharynx crowded and small  NECK: no adenopathy, no asymmetry, masses, or scars, thyroid normal to palpation and trachea midline and normal to palpation  RESP: lungs clear to auscultation - no rales, rhonchi or wheezes  CV: regular rates and rhythm, normal S1 S2, no S3 or S4, no murmur, click or rub and no irregular beats  LYMPHATICS: no cervical adenopathy  MS: extremities normal- no gross deformities noted and pitting 1+ lower extremity edema bilaterally  NEURO: Normal strength and tone, mentation intact, speech normal and cranial nerves 2-12 intact  Mallampati Class: IV.  Tonsillar Stage: 0  surgically removed.    Impression/Plan:    (G47.33) BERNADETTE (obstructive sleep apnea)  (primary encounter diagnosis)  Comment: Mr. 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 He was initially diagnosed in 2008. His most recent PSG in 2015 showed an AHI of about 100/hr with hypoxemia related to the apnea. Both responded well to CPAP. He has been on auto CPAP 13-16 cm. He says he loves it but missed over a month of use in January due to a sinus infection. His compliance over the last year was 62%, largely because " of that missed month. He has no other complaints about his sleep or the CPAP. He has gained 25 pounds since 2015 and his BMI is now 51. He denies morning headache or confusion. His CO2 on metabolic panel has been normal (26). He denies daytime sleepiness unless he stays up too late watching TV. He is off of work over the winter and his sleep schedule and eating/drinking habits are very poor during that time. When working, he often works 12-14 hour shifts and then only has time to eat and sleep when home.   Plan: Comprehensive DME        Continue auto CPAP 13-16 cm. A prescription was written for new supplies. We reviewed compliance goals. He will return just prior to his DOT visit to get a new download, hopefully showing improved compliance. We talked about the importance of consistent sleep scheduling. He will work on trying to go to bed at 10 PM and getting up by 6:30 AM nightly.     He will follow up with me in approximately 1-2 years.       Polysomnography reviewed.  Obstructive sleep apnea reviewed.  Complications of untreated sleep apnea were reviewed.  45 minutes was spent during this visit, over 50% in counseling and coordination of care.   Bennett Goltz, PA-C    CC: Laura Austin MD

## 2020-03-04 NOTE — PATIENT INSTRUCTIONS
Your BMI is Body mass index is 51.77 kg/m .  Weight management is a personal decision.  If you are interested in exploring weight loss strategies, the following discussion covers the approaches that may be successful. Body mass index (BMI) is one way to tell whether you are at a healthy weight, overweight, or obese. It measures your weight in relation to your height.  A BMI of 18.5 to 24.9 is in the healthy range. A person with a BMI of 25 to 29.9 is considered overweight, and someone with a BMI of 30 or greater is considered obese. More than two-thirds of American adults are considered overweight or obese.  Being overweight or obese increases the risk for further weight gain. Excess weight may lead to heart disease and diabetes.  Creating and following plans for healthy eating and physical activity may help you improve your health.  Weight control is part of healthy lifestyle and includes exercise, emotional health, and healthy eating habits. Careful eating habits lifelong are the mainstay of weight control. Though there are significant health benefits from weight loss, long-term weight loss with diet alone may be very difficult to achieve- studies show long-term success with dietary management in less than 10% of people. Attaining a healthy weight may be especially difficult to achieve in those with severe obesity. In some cases, medications, devices and surgical management might be considered.  What can you do?  If you are overweight or obese and are interested in methods for weight loss, you should discuss this with your provider.     Consider reducing daily calorie intake by 500 calories.     Keep a food journal.     Avoiding skipping meals, consider cutting portions instead.    Diet combined with exercise helps maintain muscle while optimizing fat loss. Strength training is particularly important for building and maintaining muscle mass. Exercise helps reduce stress, increase energy, and improves fitness.  Increasing exercise without diet control, however, may not burn enough calories to loose weight.       Start walking three days a week 10-20 minutes at a time    Work towards walking thirty minutes five days a week     Eventually, increase the speed of your walking for 1-2 minutes at time    In addition, we recommend that you review healthy lifestyles and methods for weight loss available through the National Institutes of Health patient information sites:  http://win.niddk.nih.gov/publications/index.htm    And look into health and wellness programs that may be available through your health insurance provider, employer, local community center, or [employer].    Weight management plan: Patient was referred to their PCP to discuss a diet and exercise plan.

## 2020-03-04 NOTE — NURSING NOTE
"Chief Complaint   Patient presents with     Sleep Problem     DOT necessary       Initial BP (!) 165/117   Pulse 69   Resp 16   Ht 1.791 m (5' 10.5\")   Wt (!) 166 kg (366 lb)   SpO2 94%   BMI 51.77 kg/m   Estimated body mass index is 51.77 kg/m  as calculated from the following:    Height as of this encounter: 1.791 m (5' 10.5\").    Weight as of this encounter: 166 kg (366 lb).    Medication Reconciliation: complete     ESS 6  Neck 52cm  Judy Gardner MA        "

## 2020-04-01 ENCOUNTER — TRANSFERRED RECORDS (OUTPATIENT)
Dept: HEALTH INFORMATION MANAGEMENT | Facility: CLINIC | Age: 55
End: 2020-04-01

## 2020-04-01 LAB
ALT SERPL-CCNC: 33 IU/L (ref 5–40)
AST SERPL-CCNC: 19 U/L (ref 5–34)
CREAT SERPL-MCNC: 1.21 MG/DL (ref 0.5–1.3)

## 2020-04-14 DIAGNOSIS — M10.9 ACUTE GOUT OF LEFT FOOT, UNSPECIFIED CAUSE: ICD-10-CM

## 2020-04-14 RX ORDER — ALLOPURINOL 100 MG/1
TABLET ORAL
Qty: 90 TABLET | Refills: 1 | Status: SHIPPED | OUTPATIENT
Start: 2020-04-14 | End: 2020-09-21

## 2020-04-20 NOTE — TELEPHONE ENCOUNTER
Patient notified, he will call us back to schedule a med check once he knows his work schedule.       Ashley Flannery

## 2020-09-19 DIAGNOSIS — F10.10 ALCOHOL ABUSE: ICD-10-CM

## 2020-09-19 DIAGNOSIS — I10 ESSENTIAL HYPERTENSION WITH GOAL BLOOD PRESSURE LESS THAN 140/90: ICD-10-CM

## 2020-09-19 DIAGNOSIS — Z87.898 HISTORY OF PREDIABETES: Primary | ICD-10-CM

## 2020-09-19 DIAGNOSIS — Z52.4 KIDNEY DONOR: ICD-10-CM

## 2020-09-19 DIAGNOSIS — M10.9 ACUTE GOUT OF LEFT FOOT, UNSPECIFIED CAUSE: ICD-10-CM

## 2020-09-19 DIAGNOSIS — Z13.6 CARDIOVASCULAR SCREENING; LDL GOAL LESS THAN 100: ICD-10-CM

## 2020-09-19 DIAGNOSIS — E03.9 HYPOTHYROIDISM, UNSPECIFIED TYPE: ICD-10-CM

## 2020-09-19 DIAGNOSIS — R79.89 ELEVATED SERUM CREATININE: ICD-10-CM

## 2020-09-19 DIAGNOSIS — I71.20 THORACIC AORTIC ANEURYSM WITHOUT RUPTURE (H): ICD-10-CM

## 2020-09-19 NOTE — LETTER
[address] [id number] [phone]       October 8, 2020    Michael Esteban  [facility] [facility] [address]    Michael:    We have been calling you regarding a recent refill request we received for lisinopril and allopurinol.  Unfortunately, we were unable to reach you.  We are notifying you that you are due for a blood pressure check, fasting labs and an office visit  prior to your next refill.  You can schedule this appointment via [employer] or by calling the clinic at [phone].          Sincerely,       Laura Austin M.D./jerardo

## 2020-09-21 RX ORDER — LISINOPRIL 20 MG/1
TABLET ORAL
Qty: 90 TABLET | Refills: 0 | Status: SHIPPED | OUTPATIENT
Start: 2020-09-21 | End: 2020-12-23

## 2020-09-21 RX ORDER — ALLOPURINOL 100 MG/1
TABLET ORAL
Qty: 90 TABLET | Refills: 0 | Status: SHIPPED | OUTPATIENT
Start: 2020-09-21 | End: 2020-12-18

## 2020-09-21 NOTE — TELEPHONE ENCOUNTER
BP Readings from Last 3 Encounters:   03/04/20 (!) 137/96   01/08/20 130/88   12/27/19 130/82     done x 1 month only. 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 Please inform pt and  Please assist pt in making appt to be seen for the above and virtual visit with me in the next 2 months, please. Needs recheck px with me in 1/2021 as well.     Creatinine   Date Value Ref Range Status   04/01/2020 1.210 0.500 - 1.300 mg/dL Final      Creatinine   Date Value Ref Range Status   04/01/2020 1.210 0.500 - 1.300 mg/dL Final   01/08/2020 1.13 0.66 - 1.25 mg/dL Final   05/07/2018 1.050 0.500 - 1.300 mg/dL Final   01/15/2018 1.35 (H) 0.66 - 1.25 mg/dL Final   05/08/2017 1.15 0.66 - 1.25 mg/dL Final     Recent Labs   Lab Test 01/08/20  1148 05/08/17  1136  01/08/15  1217 09/09/14  0731   CHOL 144 76   < > 156 137   HDL 35* 30*   < > 39* 41   LDL 80 31   < > 95 75   TRIG 147 73   < > 110 107   CHOLHDLRATIO  --   --   --  4.0 3.3    < > = values in this interval not displayed.      Future orders placed in [facility] for labs.   TSH   Date Value Ref Range Status   01/08/2020 2.40 0.40 - 4.00 mU/L Final      Lab Results   Component Value Date    A1C 5.5 03/18/2016    A1C 5.9 02/18/2015    A1C 5.8 02/03/2015

## 2020-09-21 NOTE — TELEPHONE ENCOUNTER
Routing refill request to provider for review/approval because:    ACE Inhibitors (Including Combos) Protocol [id number]/19/2020 09:33 AM   Blood pressure under 140/90 in past 12 months

## 2020-09-23 NOTE — TELEPHONE ENCOUNTER
Routing to [facility] - please assist in scheduling visits below      Zahra Buck RN  [facility]

## 2020-10-08 NOTE — TELEPHONE ENCOUNTER
Left non-detailed message for patient to call back.  Please schedule BP check, fasting labs and virtual visit when patient calls back.  (see previous notes for details)    I have been unable to reach this patient by phone.  A letter is being sent to the last known home address.    Thanks Ashley

## 2020-12-16 DIAGNOSIS — M10.9 ACUTE GOUT OF LEFT FOOT, UNSPECIFIED CAUSE: ICD-10-CM

## 2020-12-18 RX ORDER — ALLOPURINOL 100 MG/1
TABLET ORAL
Qty: 90 TABLET | Refills: 0 | Status: SHIPPED | OUTPATIENT
Start: 2020-12-18 | End: 2021-03-30

## 2020-12-18 NOTE — TELEPHONE ENCOUNTER
Routing refill request to provider for review/approval because:  Labs not current:  Uric acid  Lloyd Pedraza RN, BSN

## 2020-12-23 DIAGNOSIS — I71.20 THORACIC AORTIC ANEURYSM WITHOUT RUPTURE (H): ICD-10-CM

## 2020-12-23 DIAGNOSIS — I10 ESSENTIAL HYPERTENSION WITH GOAL BLOOD PRESSURE LESS THAN 140/90: ICD-10-CM

## 2020-12-23 RX ORDER — LISINOPRIL 20 MG/1
TABLET ORAL
Qty: 90 TABLET | Refills: 0 | Status: SHIPPED | OUTPATIENT
Start: 2020-12-23

## 2020-12-23 NOTE — TELEPHONE ENCOUNTER
Medication is being filled for 1 time refill only due to:  Patient needs to be seen because due for PX.    Zahra Buck RN  [facility]

## 2021-01-15 ENCOUNTER — HEALTH MAINTENANCE LETTER (OUTPATIENT)
Age: 56
End: 2021-01-15

## 2021-03-18 ENCOUNTER — TELEPHONE (OUTPATIENT)
Dept: SLEEP MEDICINE | Facility: CLINIC | Age: 56
End: 2021-03-18

## 2021-03-18 NOTE — TELEPHONE ENCOUNTER
Patient called today.    [facility]    Needs by April 1, 2021    Please contact patient.    Thank you.    Central Scheduling  Maya NUNEZ.

## 2021-03-20 ENCOUNTER — HEALTH MAINTENANCE LETTER (OUTPATIENT)
Age: 56
End: 2021-03-20

## 2021-03-21 DIAGNOSIS — M10.9 ACUTE GOUT OF LEFT FOOT, UNSPECIFIED CAUSE: ICD-10-CM

## 2021-03-22 NOTE — TELEPHONE ENCOUNTER
Spoke with patient. He was looking for his cpap data download which per patient he has already spoken with someone and this has been taken care of.

## 2021-03-24 ENCOUNTER — MYC MEDICAL ADVICE (OUTPATIENT)
Dept: FAMILY MEDICINE | Facility: CLINIC | Age: 56
End: 2021-03-24

## 2021-03-24 NOTE — TELEPHONE ENCOUNTER
Please see my chart message below     Please review and advise     Thank you     Sol Crane RN, BSN  [facility]

## 2021-03-24 NOTE — TELEPHONE ENCOUNTER
LOV: 1/8/2020  Patient due for physical  No future appt scheduled    Routing to [facility] to assist in scheduling      Zahra Buck RN  [facility]

## 2021-03-30 RX ORDER — ALLOPURINOL 100 MG/1
TABLET ORAL
Qty: 30 TABLET | Refills: 0 | Status: SHIPPED | OUTPATIENT
Start: 2021-03-30

## 2021-03-30 NOTE — TELEPHONE ENCOUNTER
Please see my chart message below     Please review and advise     Thank you     Sol Crane RN, BSN  [facility]

## 2021-03-30 NOTE — TELEPHONE ENCOUNTER
2nd attempt.  Pt has not read [employer] message.  Called [phone] and spoke with pt.  He had a change in insurance and has to use [facility] now.  Can we send in a alex refill to give him time to find a new PCP please?  Trinity Thomas

## 2021-04-21 DIAGNOSIS — M10.9 ACUTE GOUT OF LEFT FOOT, UNSPECIFIED CAUSE: ICD-10-CM

## 2021-04-21 NOTE — LETTER
[facility] - [address] [address]  [phone]            April 29, 2021    Michael Esteban                                                                                                                                                       [facility] [facility] [address]              Dear Michael,

## 2021-04-21 NOTE — LETTER
[facility]          [address] [place] [id number]         [phone]  May 7, 2021    Michael Esteban  [facility] [id number]    Dear Michael,  We have attempted to contact you regarding a prescription refill request we have on file.  At this time we are not able to fill this medication until we see you in the office for an annual physical and medication review.  Once an appointment is scheduled we can refill your medication for a 30 day supply. Please call us at [phone] to schedule an appointment or with any questions or concerns.      Here is a list of Health Maintenance topics that are due now or due soon:  Health Maintenance Due   Topic Date Due     ANNUAL REVIEW OF HM ORDERS  Never done     Pneumococcal Vaccine: Pediatrics (0 to 5 Years) and At-Risk Patients (6 to 64 Years) (1 of 2 - PPSV23) Never done     COVID-19 Vaccine (1) Never done     ZOSTER IMMUNIZATION (1 of 2) Never done     PHQ-2  01/01/2021     PREVENTIVE CARE VISIT  01/08/2021     BMP  01/08/2021     LIPID  01/08/2021     MICROALBUMIN  01/08/2021     PSA  01/08/2021        Thank you for trusting [facility] and we appreciate the opportunity to serve you.  We look forward to supporting your healthcare needs in the future.    Daniela Regards,    [facility]

## 2021-04-21 NOTE — TELEPHONE ENCOUNTER
LOV: 1/8/2020  Patient due for physical  No future appt scheduled    Routing to [facility] to assist in scheduling      Zahra Buck RN  [facility]

## 2021-05-02 RX ORDER — ALLOPURINOL 100 MG/1
TABLET ORAL
Qty: 30 TABLET | Refills: 0 | OUTPATIENT
Start: 2021-05-02

## 2021-10-24 ENCOUNTER — HEALTH MAINTENANCE LETTER (OUTPATIENT)
Age: 56
End: 2021-10-24

## 2022-03-14 DIAGNOSIS — G47.33 OBSTRUCTIVE SLEEP APNEA: Primary | ICD-10-CM

## 2022-04-10 ENCOUNTER — HEALTH MAINTENANCE LETTER (OUTPATIENT)
Age: 57
End: 2022-04-10

## 2022-05-19 NOTE — PATIENT INSTRUCTIONS
"Strongly recommend help with weight loss with an organized program such as -  Medifast (now called  Daily - faby Harman- as of 8/1/2019) ,  Weight watchers, Nutrisystem, Ideal Protein - available through Dr. Costa's office at [facility];  Slim-Genics, Cheryl Garcia, or  Overeaters Anonymous - HOW program or other organized independent nutritionally balanced weight loss program.  We all need help with something and one of these programs may help you in your lifestyle change journey with weight loss.        Establish an exercise regimen. Activity goal: 45 minutes 5 days a week. New exercise routine: cardiovascular workout on exercise equipment, walking and weightlifting. Diet regimen was discussed and plan is self-directed dieting: reduce calories, reduce portions, reduce processed  carbs, increase fruits/vegetables and avoid sweets and supervised diet program. Avoid artificial sweeteners and \"diet\" drinks and sodas.          Preventive Health Recommendations  Male Ages 50 - 64    Yearly exam:             See your health care provider every year in order to  o   Review health changes.   o   Discuss preventive care.    o   Review your medicines if your doctor has prescribed any.     Have a cholesterol test every 5 years, or more frequently if you are at risk for high cholesterol/heart disease.     Have a diabetes test (fasting glucose) every three years. If you are at risk for diabetes, you should have this test more often.     Have a colonoscopy at age 50, or have a yearly FIT test (stool test). These exams will check for colon cancer.      Talk with your health care provider about whether or not a prostate cancer screening test (PSA) is right for you.    You should be tested each year for STDs (sexually transmitted diseases), if you re at risk.     Shots: Get a flu shot each year. Get a tetanus shot every 10 years.     Nutrition:    Eat at least 5 servings of fruits and vegetables " daily.     Eat whole-grain bread, whole-wheat pasta and brown rice instead of white grains and rice.     Get adequate Calcium and Vitamin D.     Lifestyle    Exercise for at least 150 minutes a week (30 minutes a day, 5 days a week). This will help you control your weight and prevent disease.     Limit alcohol to one drink per day.     No smoking.     Wear sunscreen to prevent skin cancer.     See your dentist every six months for an exam and cleaning.     See your eye doctor every 1 to 2 years.    Thank you so much or choosing [facility]  for your Health Care. It was a pleasure seeing you at your visit today! Please contact us with any questions or concerns you may have.                   Laura Austin MD                              To reach your [facility] care team after hours call:   [phone]    Our clinic hours are:     Monday- 7:30 am - 7:00 pm                             Tuesday through Friday- 7:30 am - 5:00 pm                                        Saturday- 8:00 am - 12:00 pm                  Phone:  [phone]    Our pharmacy hours are:     Monday  8:00 am to 7:00 pm      Tuesday through Friday 8:00am to 6:00pm                        Saturday - 9:00 am to 1:00 pm      Sunday : Closed.              Phone:  [phone]      There is also information available at our web site:  [email or web address]    If your provider ordered any lab tests and you do not receive the results within 10 business days, please call the clinic.    If you need a medication refill please contact your pharmacy.  Please allow 2 business days for your refill to be completed.    Our clinic offers telephone visits and e visits.  Please ask one of your team members to explain more.      Use [employer] (secure email communication and access to your chart) to send your primary care provider a message or make an appointment. Ask someone on your Team how to sign up for [employer].   no

## 2022-10-16 ENCOUNTER — HEALTH MAINTENANCE LETTER (OUTPATIENT)
Age: 57
End: 2022-10-16

## 2023-03-26 ENCOUNTER — HEALTH MAINTENANCE LETTER (OUTPATIENT)
Age: 58
End: 2023-03-26

## 2024-03-01 NOTE — PATIENT INSTRUCTIONS
We addressed the following today:    No diagnosis found.    Order has been placed for physical therapy at [facility] - [phone]    Follow up in 4 months (sooner if needed) . Call clinic triage number [[phone],pick option 2, then option 3] at any time with questions or concerns.           PAST SURGICAL HISTORY:  No significant past surgical history

## 2024-06-01 ENCOUNTER — HEALTH MAINTENANCE LETTER (OUTPATIENT)
Age: 59
End: 2024-06-01

## 2025-02-28 NOTE — ADDENDUM NOTE
Addended by: JOSEPHINE EDWARDS on: 1/22/2019 10:52 AM     Modules accepted: Orders     [Follow-Up: _____] : a [unfilled] follow-up visit

## 2025-04-29 ENCOUNTER — HOSPITAL ENCOUNTER (INPATIENT)
Facility: CLINIC | Age: 60
DRG: 309 | End: 2025-04-29
Attending: EMERGENCY MEDICINE | Admitting: STUDENT IN AN ORGANIZED HEALTH CARE EDUCATION/TRAINING PROGRAM
Payer: COMMERCIAL

## 2025-04-29 ENCOUNTER — APPOINTMENT (OUTPATIENT)
Dept: CT IMAGING | Facility: CLINIC | Age: 60
DRG: 309 | End: 2025-04-29
Attending: EMERGENCY MEDICINE
Payer: COMMERCIAL

## 2025-04-29 ENCOUNTER — APPOINTMENT (OUTPATIENT)
Dept: GENERAL RADIOLOGY | Facility: CLINIC | Age: 60
DRG: 309 | End: 2025-04-29
Attending: EMERGENCY MEDICINE
Payer: COMMERCIAL

## 2025-04-29 DIAGNOSIS — R55 SYNCOPE, UNSPECIFIED SYNCOPE TYPE: ICD-10-CM

## 2025-04-29 DIAGNOSIS — N17.9 AKI (ACUTE KIDNEY INJURY): ICD-10-CM

## 2025-04-29 DIAGNOSIS — R00.2 PALPITATIONS: ICD-10-CM

## 2025-04-29 DIAGNOSIS — R41.0 CONFUSION: ICD-10-CM

## 2025-04-29 DIAGNOSIS — I63.9 CEREBROVASCULAR ACCIDENT (CVA), UNSPECIFIED MECHANISM (H): Primary | ICD-10-CM

## 2025-04-29 DIAGNOSIS — I71.20 THORACIC AORTIC ANEURYSM WITHOUT RUPTURE: ICD-10-CM

## 2025-04-29 DIAGNOSIS — I48.91 ATRIAL FIBRILLATION WITH RVR (H): ICD-10-CM

## 2025-04-29 DIAGNOSIS — R07.89 CHEST DISCOMFORT: ICD-10-CM

## 2025-04-29 LAB
ALBUMIN SERPL BCG-MCNC: 3.9 G/DL (ref 3.5–5.2)
ALP SERPL-CCNC: 76 U/L (ref 40–150)
ALT SERPL W P-5'-P-CCNC: 48 U/L (ref 0–70)
ANION GAP SERPL CALCULATED.3IONS-SCNC: 10 MMOL/L (ref 7–15)
AST SERPL W P-5'-P-CCNC: 25 U/L (ref 0–45)
ATRIAL RATE - MUSE: 178 BPM
BASOPHILS # BLD AUTO: 0 10E3/UL (ref 0–0.2)
BASOPHILS NFR BLD AUTO: 0 %
BILIRUB SERPL-MCNC: 0.5 MG/DL
BUN SERPL-MCNC: 26.5 MG/DL (ref 8–23)
CALCIUM SERPL-MCNC: 9.2 MG/DL (ref 8.8–10.4)
CHLORIDE SERPL-SCNC: 103 MMOL/L (ref 98–107)
CREAT SERPL-MCNC: 2.13 MG/DL (ref 0.67–1.17)
DIASTOLIC BLOOD PRESSURE - MUSE: NORMAL MMHG
EGFRCR SERPLBLD CKD-EPI 2021: 35 ML/MIN/1.73M2
EOSINOPHIL # BLD AUTO: 0.1 10E3/UL (ref 0–0.7)
EOSINOPHIL NFR BLD AUTO: 1 %
ERYTHROCYTE [DISTWIDTH] IN BLOOD BY AUTOMATED COUNT: 13.9 % (ref 10–15)
EST. AVERAGE GLUCOSE BLD GHB EST-MCNC: 128 MG/DL
GLUCOSE SERPL-MCNC: 110 MG/DL (ref 70–99)
HBA1C MFR BLD: 6.1 %
HCO3 SERPL-SCNC: 27 MMOL/L (ref 22–29)
HCT VFR BLD AUTO: 42.9 % (ref 40–53)
HGB BLD-MCNC: 13.7 G/DL (ref 13.3–17.7)
IMM GRANULOCYTES # BLD: 0 10E3/UL
IMM GRANULOCYTES NFR BLD: 0 %
INTERPRETATION ECG - MUSE: NORMAL
LYMPHOCYTES # BLD AUTO: 2.1 10E3/UL (ref 0.8–5.3)
LYMPHOCYTES NFR BLD AUTO: 22 %
MCH RBC QN AUTO: 29.6 PG (ref 26.5–33)
MCHC RBC AUTO-ENTMCNC: 31.9 G/DL (ref 31.5–36.5)
MCV RBC AUTO: 93 FL (ref 78–100)
MONOCYTES # BLD AUTO: 0.9 10E3/UL (ref 0–1.3)
MONOCYTES NFR BLD AUTO: 10 %
NEUTROPHILS # BLD AUTO: 6.2 10E3/UL (ref 1.6–8.3)
NEUTROPHILS NFR BLD AUTO: 67 %
NRBC # BLD AUTO: 0 10E3/UL
NRBC BLD AUTO-RTO: 0 /100
P AXIS - MUSE: -67 DEGREES
PLATELET # BLD AUTO: 209 10E3/UL (ref 150–450)
POTASSIUM SERPL-SCNC: 5 MMOL/L (ref 3.4–5.3)
PR INTERVAL - MUSE: NORMAL MS
PROT SERPL-MCNC: 6.3 G/DL (ref 6.4–8.3)
QRS DURATION - MUSE: 88 MS
QT - MUSE: 348 MS
QTC - MUSE: 423 MS
R AXIS - MUSE: 95 DEGREES
RBC # BLD AUTO: 4.63 10E6/UL (ref 4.4–5.9)
SODIUM SERPL-SCNC: 140 MMOL/L (ref 135–145)
SYSTOLIC BLOOD PRESSURE - MUSE: NORMAL MMHG
T AXIS - MUSE: 70 DEGREES
TROPONIN T SERPL HS-MCNC: 11 NG/L
TROPONIN T SERPL HS-MCNC: 13 NG/L
TSH SERPL DL<=0.005 MIU/L-ACNC: 2.14 UIU/ML (ref 0.3–4.2)
VENTRICULAR RATE- MUSE: 89 BPM
WBC # BLD AUTO: 9.3 10E3/UL (ref 4–11)

## 2025-04-29 PROCEDURE — 99285 EMERGENCY DEPT VISIT HI MDM: CPT | Mod: 25

## 2025-04-29 PROCEDURE — 70450 CT HEAD/BRAIN W/O DYE: CPT

## 2025-04-29 PROCEDURE — 93005 ELECTROCARDIOGRAM TRACING: CPT | Mod: 76

## 2025-04-29 PROCEDURE — 70496 CT ANGIOGRAPHY HEAD: CPT

## 2025-04-29 PROCEDURE — 250N000013 HC RX MED GY IP 250 OP 250 PS 637: Performed by: STUDENT IN AN ORGANIZED HEALTH CARE EDUCATION/TRAINING PROGRAM

## 2025-04-29 PROCEDURE — 80061 LIPID PANEL: CPT | Performed by: STUDENT IN AN ORGANIZED HEALTH CARE EDUCATION/TRAINING PROGRAM

## 2025-04-29 PROCEDURE — G0378 HOSPITAL OBSERVATION PER HR: HCPCS

## 2025-04-29 PROCEDURE — 85004 AUTOMATED DIFF WBC COUNT: CPT | Performed by: EMERGENCY MEDICINE

## 2025-04-29 PROCEDURE — 250N000009 HC RX 250: Performed by: EMERGENCY MEDICINE

## 2025-04-29 PROCEDURE — 84443 ASSAY THYROID STIM HORMONE: CPT | Performed by: EMERGENCY MEDICINE

## 2025-04-29 PROCEDURE — 99223 1ST HOSP IP/OBS HIGH 75: CPT | Performed by: STUDENT IN AN ORGANIZED HEALTH CARE EDUCATION/TRAINING PROGRAM

## 2025-04-29 PROCEDURE — 36415 COLL VENOUS BLD VENIPUNCTURE: CPT | Performed by: EMERGENCY MEDICINE

## 2025-04-29 PROCEDURE — 96376 TX/PRO/DX INJ SAME DRUG ADON: CPT

## 2025-04-29 PROCEDURE — 96365 THER/PROPH/DIAG IV INF INIT: CPT

## 2025-04-29 PROCEDURE — 250N000011 HC RX IP 250 OP 636: Performed by: EMERGENCY MEDICINE

## 2025-04-29 PROCEDURE — 71045 X-RAY EXAM CHEST 1 VIEW: CPT

## 2025-04-29 PROCEDURE — 84484 ASSAY OF TROPONIN QUANT: CPT | Performed by: EMERGENCY MEDICINE

## 2025-04-29 PROCEDURE — 83036 HEMOGLOBIN GLYCOSYLATED A1C: CPT | Performed by: EMERGENCY MEDICINE

## 2025-04-29 PROCEDURE — 210N000002 HC R&B HEART CARE

## 2025-04-29 PROCEDURE — 82247 BILIRUBIN TOTAL: CPT | Performed by: EMERGENCY MEDICINE

## 2025-04-29 PROCEDURE — 258N000003 HC RX IP 258 OP 636: Performed by: EMERGENCY MEDICINE

## 2025-04-29 PROCEDURE — 93005 ELECTROCARDIOGRAM TRACING: CPT

## 2025-04-29 PROCEDURE — 258N000003 HC RX IP 258 OP 636: Performed by: STUDENT IN AN ORGANIZED HEALTH CARE EDUCATION/TRAINING PROGRAM

## 2025-04-29 RX ORDER — ONDANSETRON 2 MG/ML
4 INJECTION INTRAMUSCULAR; INTRAVENOUS EVERY 6 HOURS PRN
Status: DISCONTINUED | OUTPATIENT
Start: 2025-04-29 | End: 2025-05-02 | Stop reason: HOSPADM

## 2025-04-29 RX ORDER — LEVOTHYROXINE SODIUM 75 UG/1
75 TABLET ORAL DAILY
Status: DISCONTINUED | OUTPATIENT
Start: 2025-04-30 | End: 2025-05-02 | Stop reason: HOSPADM

## 2025-04-29 RX ORDER — DILTIAZEM HYDROCHLORIDE 5 MG/ML
10 INJECTION INTRAVENOUS ONCE
Status: COMPLETED | OUTPATIENT
Start: 2025-04-29 | End: 2025-04-29

## 2025-04-29 RX ORDER — AMOXICILLIN 250 MG
2 CAPSULE ORAL 2 TIMES DAILY PRN
Status: DISCONTINUED | OUTPATIENT
Start: 2025-04-29 | End: 2025-05-02 | Stop reason: HOSPADM

## 2025-04-29 RX ORDER — LIDOCAINE 40 MG/G
CREAM TOPICAL
Status: DISCONTINUED | OUTPATIENT
Start: 2025-04-29 | End: 2025-05-02 | Stop reason: HOSPADM

## 2025-04-29 RX ORDER — ONDANSETRON 4 MG/1
4 TABLET, ORALLY DISINTEGRATING ORAL EVERY 6 HOURS PRN
Status: DISCONTINUED | OUTPATIENT
Start: 2025-04-29 | End: 2025-05-02 | Stop reason: HOSPADM

## 2025-04-29 RX ORDER — IOPAMIDOL 755 MG/ML
69 INJECTION, SOLUTION INTRAVASCULAR ONCE
Status: COMPLETED | OUTPATIENT
Start: 2025-04-29 | End: 2025-04-29

## 2025-04-29 RX ORDER — AMOXICILLIN 250 MG
1 CAPSULE ORAL 2 TIMES DAILY PRN
Status: DISCONTINUED | OUTPATIENT
Start: 2025-04-29 | End: 2025-05-02 | Stop reason: HOSPADM

## 2025-04-29 RX ORDER — PROCHLORPERAZINE MALEATE 10 MG
10 TABLET ORAL EVERY 6 HOURS PRN
Status: DISCONTINUED | OUTPATIENT
Start: 2025-04-29 | End: 2025-05-02 | Stop reason: HOSPADM

## 2025-04-29 RX ORDER — ACETAMINOPHEN 325 MG/1
650 TABLET ORAL EVERY 4 HOURS PRN
Status: DISCONTINUED | OUTPATIENT
Start: 2025-04-29 | End: 2025-05-02 | Stop reason: HOSPADM

## 2025-04-29 RX ORDER — ALLOPURINOL 300 MG/1
300 TABLET ORAL DAILY
Status: DISCONTINUED | OUTPATIENT
Start: 2025-04-30 | End: 2025-05-02 | Stop reason: HOSPADM

## 2025-04-29 RX ORDER — ALLOPURINOL 300 MG/1
300 TABLET ORAL DAILY
COMMUNITY

## 2025-04-29 RX ORDER — ASPIRIN 81 MG/1
81 TABLET, CHEWABLE ORAL DAILY
Status: DISCONTINUED | OUTPATIENT
Start: 2025-04-30 | End: 2025-05-02 | Stop reason: HOSPADM

## 2025-04-29 RX ORDER — ACETAMINOPHEN 650 MG/1
650 SUPPOSITORY RECTAL EVERY 4 HOURS PRN
Status: DISCONTINUED | OUTPATIENT
Start: 2025-04-29 | End: 2025-05-02 | Stop reason: HOSPADM

## 2025-04-29 RX ORDER — ATORVASTATIN CALCIUM 40 MG/1
40 TABLET, FILM COATED ORAL DAILY
Status: DISCONTINUED | OUTPATIENT
Start: 2025-04-30 | End: 2025-04-30

## 2025-04-29 RX ORDER — CARVEDILOL 25 MG/1
25 TABLET ORAL 2 TIMES DAILY WITH MEALS
Status: DISCONTINUED | OUTPATIENT
Start: 2025-04-29 | End: 2025-04-30

## 2025-04-29 RX ADMIN — IOPAMIDOL 69 ML: 755 INJECTION, SOLUTION INTRAVENOUS at 18:17

## 2025-04-29 RX ADMIN — DILTIAZEM HYDROCHLORIDE 5 MG/HR: 5 INJECTION, SOLUTION INTRAVENOUS at 17:36

## 2025-04-29 RX ADMIN — SODIUM CHLORIDE 500 ML: 9 INJECTION, SOLUTION INTRAVENOUS at 21:00

## 2025-04-29 RX ADMIN — SODIUM CHLORIDE 90 ML: 9 INJECTION, SOLUTION INTRAVENOUS at 18:18

## 2025-04-29 RX ADMIN — DILTIAZEM HYDROCHLORIDE 10 MG: 5 INJECTION, SOLUTION INTRAVENOUS at 17:28

## 2025-04-29 RX ADMIN — CARVEDILOL 25 MG: 25 TABLET, FILM COATED ORAL at 21:38

## 2025-04-29 ASSESSMENT — COLUMBIA-SUICIDE SEVERITY RATING SCALE - C-SSRS
6. HAVE YOU EVER DONE ANYTHING, STARTED TO DO ANYTHING, OR PREPARED TO DO ANYTHING TO END YOUR LIFE?: NO
2. HAVE YOU ACTUALLY HAD ANY THOUGHTS OF KILLING YOURSELF IN THE PAST MONTH?: NO
1. IN THE PAST MONTH, HAVE YOU WISHED YOU WERE DEAD OR WISHED YOU COULD GO TO SLEEP AND NOT WAKE UP?: NO

## 2025-04-29 ASSESSMENT — ACTIVITIES OF DAILY LIVING (ADL)
ADLS_ACUITY_SCORE: 42

## 2025-04-29 NOTE — ED PROVIDER NOTES
"  Emergency Department Note      History of Present Illness     Chief Complaint   Tachycardia      HPI   Michael Esteban is a 59 year old male with a history of thoracic aortic aneurysm without rupture status post repair, hypertension, hyperlipidemia, hypothyroidism, obesity, and gout who presents to the ED via EMS for evaluation of tachycardia. EMS reports that the patient has not been feeling well for a few days with chest pain, \"pressure\" behind his eyes, and an accompanied syncopal episode at home, prompting a visit to Urgent Care where they recommended he be transferred to the ED via EMS for abnormal ECG findings consistent with SVT versus atrial flutter/fib.  He was administered 6 mg adenosine and then 12 mg adenosine x 2 with flutter waves on EKG.  They state that the patient had a heart rate between 176-180 during transit with variation between 150-180 after being given 400 ML fluids, further noting a manual blood pressure of 110/62. EMS adds that they administered 324 MG ASA in transit.    The patient reports feeling unwell for the last few days accompanied by an abnormal sensation of his heart intermittently \"fluttering\" with no previous history of similar symptoms. He states that he previously had an aortic repair procedure done and has not been to the doctor in years.  He notes recent occasional episodes of chest discomfort.  Sister reports that the patient was just recently fired from his job as a  for new onset difficulty using the required phone stefanie, adding that he has been unusually confused with difficulty doing previously normal activities. Patient endorses taking 81 MG ASA daily. He denies any fever, vomiting, diarrhea, abdominal pain, sick contact, smoking, new lower extremity swelling, or current anticoagulation.    Independent Historian   EMS as detailed above.    Review of External Notes   N/A    Past Medical History     Medical History and Problem List "   Gout  Hyperlipidemia  Nicotine dependence  Thoracic aortic aneurysm without rupture  Hypothyroidism  Hypertension  Kidney donor  Obesity  BERNADETTE  Alcohol abuse  Arthralgia of lower leg  Tobacco use disorder  Umbilical hernia without obstruction and without gangrene  Vertigo    Medications   Zyloprim  ASA 81 MG  Lipitor  Coreg  Synthroid/Levothroid  Zestril    Surgical History   Arthroscopy knee, right  Colonoscopy  Repair aneurysm ascending aorta  Repair patent foramen ovale  Nephrectomy, left, donor  Tonsillectomy    Physical Exam     Patient Vitals for the past 24 hrs:   BP Temp Temp src Pulse Resp SpO2   04/29/25 1748 -- -- -- 89 17 95 %   04/29/25 1744 -- 98.3  F (36.8  C) Temporal -- -- --   04/29/25 1743 -- -- -- 89 15 95 %   04/29/25 1730 -- -- -- 112 21 --   04/29/25 1729 110/75 -- -- 91 30 --   04/29/25 1716 119/69 -- -- -- -- --   04/29/25 1715 -- -- -- (!) 181 24 96 %     Physical Exam  General: Alert and cooperative with exam. Patient in mild distress. Normal mentation. Morbidly obese.  Nontoxic appearance  Head:  Scalp is NC/AT  Eyes:  No scleral icterus, PERRL  ENT:  The external nose and ears are normal. The oropharynx is normal and without erythema; mucus membranes are moist. Uvula midline, no evidence of deep space infection.  Neck:  Normal range of motion without rigidity.  CV:  Tachycardic and irregular rhythm.    No pathologic murmur   Resp:  Breath sounds are clear bilaterally    Non-labored, no retractions or accessory muscle use  GI:  Abdomen is soft, no distension, no tenderness. No peritoneal signs  MS:  No lower extremity edema   Skin:  Warm and dry, No rash or lesions noted.  Neuro: Oriented x 3. No gross motor deficits. CN II-XII intact.    Diagnostics     Lab Results   Labs Ordered and Resulted from Time of ED Arrival to Time of ED Departure   COMPREHENSIVE METABOLIC PANEL - Abnormal       Result Value    Sodium 140      Potassium 5.0      Carbon Dioxide (CO2) 27      Anion Gap 10       Urea Nitrogen 26.5 (*)     Creatinine 2.13 (*)     GFR Estimate 35 (*)     Calcium 9.2      Chloride 103      Glucose 110 (*)     Alkaline Phosphatase 76      AST 25      ALT 48      Protein Total 6.3 (*)     Albumin 3.9      Bilirubin Total 0.5     HEMOGLOBIN A1C - Abnormal    Estimated Average Glucose 128 (*)     Hemoglobin A1C 6.1 (*)    TROPONIN T, HIGH SENSITIVITY - Normal    Troponin T, High Sensitivity 13     TSH WITH FREE T4 REFLEX - Normal    TSH 2.14     CBC WITH PLATELETS AND DIFFERENTIAL    WBC Count 9.3      RBC Count 4.63      Hemoglobin 13.7      Hematocrit 42.9      MCV 93      MCH 29.6      MCHC 31.9      RDW 13.9      Platelet Count 209      % Neutrophils 67      % Lymphocytes 22      % Monocytes 10      % Eosinophils 1      % Basophils 0      % Immature Granulocytes 0      NRBCs per 100 WBC 0      Absolute Neutrophils 6.2      Absolute Lymphocytes 2.1      Absolute Monocytes 0.9      Absolute Eosinophils 0.1      Absolute Basophils 0.0      Absolute Immature Granulocytes 0.0      Absolute NRBCs 0.0     TROPONIN T, HIGH SENSITIVITY       Imaging   Chest XR,  PA & LAT    (Results Pending)   Head CT w/o contrast    (Results Pending)   CTA Head Neck with Contrast    (Results Pending)       EKG #1   ECG taken at 1717, ECG read at 1725  Atrial flutter with variable AV block  Rightward axis  Nonspecific T wave abnormality  Abnormal ECG   Rate 122 bpm. CA interval * ms. QRS duration 80 ms. QT/QTc 314/447 ms. P-R-T axes -72 108 66.    EKG #2  ECG taken at 1743, ECG read at 1818  Sinus rhythm  Rightward axis  Low voltage QRS  Abnormal ECG   No atrial flutter as compared to prior, dated 4/29/25.  Rate 89 bpm. CA interval * ms. QRS duration 88 ms. QT/QTc 348/423 ms. P-R-T axes -67 95 70.    Independent Interpretation   none    ED Course      Medications Administered   Medications   diltiazem (CARDIZEM) 125 mg in sodium chloride 0.9 % 125 mL infusion (5 mg/hr Intravenous $New Bag 4/29/25 8006)   diltiazem  (CARDIZEM) injection 10 mg (10 mg Intravenous $Given 4/29/25 1728)   sodium chloride 0.9 % bag for CT scan flush (90 mLs Intravenous $Given 4/29/25 1818)   iopamidol (ISOVUE-370) solution 69 mL (69 mLs Intravenous $Given 4/29/25 1817)       Procedures   Procedures     Discussion of Management   Admitting Hospitalist, Dr. Crump    ED Course   ED Course as of 04/29/25 1836 Tue Apr 29, 2025   1709 I obtained history and examined the patient as noted above     1748 I rechecked the patient and explained findings. Patient's sister arrived, discussed recent changes in cognition.     1800 I updated the patient on plan of care.   1822 I spoke with Dr. Crump of the hospitalist team, who accepted the patient for admission.         Additional Documentation  None    Medical Decision Making / Diagnosis     CMS Diagnoses: None    MIPS       None    [facility]   Michael Esteban is a 59 year old male presents with syncopal episode, episodes of palpitations/chest discomfort, increased confusion over the last several weeks, and was noted to be in new onset A-fib RVR in clinic just prior to ED evaluation.  Patient presents by EMS was seen initially in the stabilization room.  He was noted to be in atrial fibrillation with RVR though there is no evidence of acute ischemia or infarction on EKG.  Patient is not a candidate for ED cardioversion and is unaware of his current tachycardia.  He was provided 10 mg diltiazem bolus and placed on infusion with later conversion to normal sinus rhythm; confirmed on repeat EKG.  Chest x-ray pending.  Labs notable for mildly elevated hemoglobin A1c (6.1), AMAYA (creatinine 2.13), normal troponin (13), normal thyroid testing.  See complete results above.  Patient's neurologic exam was nonfocal though he notes increased difficulty doing routine tasks over the last several weeks.  As stroke is not excluded, CT/CTA of the head neck was obtained and is pending.  Patient will be admitted to the  hospitalist service for further evaluation and care.  He did receive aspirin prior to ED arrival.  Will defer anticoagulation to inpatient team.  Asymptomatic and stable at time of admission.    Disposition   The patient was admitted to the hospital.     Diagnosis     ICD-10-CM    1. Atrial fibrillation with RVR (H)  I48.91       2. AMAYA (acute kidney injury)  N17.9       3. Palpitations  R00.2       4. Confusion  R41.0       5. Chest discomfort  R07.89       6. Syncope, unspecified syncope type  R55              Scribe Disclosure:  I, Miguelito Garcia, am serving as a scribe at 5:24 PM on 4/29/2025 to document services personally performed by Michael Lewis DO, based on my observations and the provider's statements to me.        Michael Lewis DO  04/29/25 1933       Michael Lewis DO  04/29/25 221

## 2025-04-29 NOTE — H&P
[facility]    History and Physical - Hospitalist Service       Date of Admission:  4/29/2025    Assessment & Plan      Michael Esteban is a 59 year old male admitted on 4/29/2025 from clinic for palpitation and fainting. He was found to be in Afib with RVR while in clinic and sent to ER via EMS.    New onset Aflutter with RVR  Syncope  Presented to clinic for intermittent symptoms of palpitations, chest tightness and dizziness the last few days. He did have episode of syncope just before his clinic visit. HR found to be in to the 180s which noted SVT vs Afib. He was given adenosine x2 which slowed his rate but he did not convert. Presented to the ER via EMS where HR remained elevated he was started on dilt gtt and convert to NSR. Troponin returned at 14. TSH normal    - continue dilt gtt for now  - chadsvasc is low 0-1, will hold off on heparin for now  - follow head CT  - resume home asa  - continue home coreg    AMAYA on CKD  Live kidney donor s/p nephrectomy  Cr baseline ~1.5  - give 500 bolus after CT contrast  - with follow AM labs after cardioversion and encourage PO intake    New onset confusion  Rule out stroke   While in the ER he off handily reported he was fired form his [employer] job due to having new on set confusion, memory issues and inability to perform previously normal tasks. Wife confirms new confusion  - no focal deficits noted  - CTA head and neck ordered in the ED, follow up    Essential HTN  - resume home coreg but hold lisinopril for elevated Cr    Hypothyroidism  - resume PTA levothyroxine    AAA with repair in 2015  - noted continue PTA statin and asa    Gout  - continue PTA allopurinol    BERNADETTE  - home cpap settings        Diet:  NPO for now  DVT Prophylaxis: Pneumatic Compression Devices  Valdez Catheter: Not present  Lines: None     Cardiac Monitoring: None  Code Status:  FULL    Clinically Significant Risk Factors Present on Admission                 # Drug  Induced Platelet Defect: home medication list includes an antiplatelet medication   # Hypertension: Noted on problem list                      Disposition Plan     Medically Ready for Discharge: Anticipated Tomorrow           Jacqueline Crump DO  Hospitalist Service  [facility]  Securely message with [employer] (more info)  Text page via [employer] Paging/Directory     ______________________________________________________________________    Chief Complaint   dizziness    History is obtained from the patient    History of Present Illness   Michael Esteban is a 59 year old male admitted on 4/29/2025 from clinic for palpitation and fainting. He was found to be in Afib with RVR while in clinic and sent to ER via EMS.    Upon my visit with the patient he is comfortable in the ER bed with family bedside. He reports he doesn't feel the palpitations anymore but he hasn't really gotten up to know how he feels. He reports over the last few weeks even he has noticed dizziness and more recently his heart racing.He was standing today when he did briefly pass out. This prompted clinic visit. He denies chest pain or pressure with his symptoms. He denies any history of this before. He denies smoking. No major alcohol consumption. He recently lost his job and has been under more stress lately. No illness. He donated his kidney to his mother in his 20s. He is compliant with his cpap.    Upon further questioning he does report having some memory issues recently with normal tasks like how to use an stefanie. He couldn't remember how to swipe on his phone. His wife also mentions memory issues. No weakness,no headaches,no blurry vision. No slurred speech.    Tele reviewed and multiple EKG reviewed    Past Medical History    Past Medical History:   Diagnosis Date    Gout     Hyperlipidemia     Hypertension     Hypothyroidism     Kidney donor 1992     donated Left kidney to mother in 1992    Morbid (severe) obesity due to  excess calories (H)     comorbid w/: Htn, Erectile dysfunction, Swelling of legs, Excessively sleep during the day, Awaken from sleep to catch breath, Shortness of Breath with activity, Loud snoring, Stop breathing while asleep.     Thoracic aortic aneurysm without rupture     4.7 cm - s/p repair 2/2015       Past Surgical History   Past Surgical History:   Procedure Laterality Date    ARTHROSCOPY KNEE Right 2005     COLONOSCOPY N/A 1/24/2017    Procedure: COLONOSCOPY;  Surgeon: Maikol Faulkner MD;  Location: [facility]    REPAIR ANEURYSM ASCENDING AORTA N/A 2/17/2015    Procedure: REPAIR ANEURYSM ASCENDING AORTA;  Surgeon: Monae Lechuga MD;  Location: [facility] OR    REPAIR PATENT FORAMEN OVALE N/A 2/17/2015    Procedure: REPAIR PATENT FORAMEN OVALE;  Surgeon: Monae Lechuga MD;  Location: [facility] OR    s/p left nephrectomy - donor to mother  1992    TONSILLECTOMY  age 3        Prior to Admission Medications   Prior to Admission Medications   Prescriptions Last Dose Informant Patient Reported? Taking?   acetaminophen (TYLENOL) 500 MG tablet   Yes No   Sig: Take 500 mg by mouth every 6 hours as needed for mild pain   allopurinol (ZYLOPRIM) 100 MG tablet   No No   Sig: TAKE 1 TABLET BY MOUTH EVERY DAY   aspirin 81 MG chewable tablet   No No   Sig: Take 1 tablet (81 mg) by mouth daily   atorvastatin (LIPITOR) 40 MG tablet   No No   Sig: TAKE 1 TABLET (40 MG) BY MOUTH AT BEDTIME FOR CHOLESTEROL   carvedilol (COREG) 25 MG tablet   No No   Sig: TAKE 1 TABLET BY MOUTH TWICE A DAY WITH MEALS   levothyroxine (SYNTHROID/LEVOTHROID) 75 MCG tablet   No No   Sig: Take 1 tablet (75 mcg) by mouth daily   lisinopril (ZESTRIL) 20 MG tablet   No No   Sig: TAKE 1 TABLET BY MOUTH EVERY DAY   order for DME   Yes No   Sig: Equipment being ordered: RESMED AIRSENSE 10 WITH HH AND RESMED AIRFIT P10 PILLOW MASK.   sildenafil (VIAGRA) 50 MG tablet   No No   Sig: Take 1 tablet (50 mg) by mouth daily as needed (for erectile dysfunction) 30 min to  4 hrs before sex. Do not use with nitroglycerin, terazosin or doxazosin.      Facility-Administered Medications: None        Review of Systems    The 10 point Review of Systems is negative other than noted in the HPI or here.      Physical Exam   Vital Signs: Temp: 98.3  F (36.8  C) Temp src: Temporal BP: 110/75 Pulse: 89   Resp: 17 SpO2: 95 %      Weight: 0 lbs 0 oz    Constitutional: Awake, alert, cooperative, no apparent distress, very obese  Eyes: Conjunctiva and pupils examined and normal.  HEENT: Moist mucous membranes, normal dentition.  Respiratory: Clear to auscultation bilaterally, no crackles or wheezing.  Cardiovascular: Regular rate and rhythm, normal S1 and S2, and no murmur noted.  GI: Soft, non-distended, non-tender, normal bowel sounds.  Lymph/Hematologic: No anterior cervical or supraclavicular adenopathy.  Skin: No rashes, no cyanosis, no edema.  Musculoskeletal: No joint swelling, erythema or tenderness.  Neurologic: Cranial nerves 2-12 intact, normal strength and sensation.  Psychiatric: Alert, oriented to person, place and time, no obvious anxiety or depression.     Medical Decision Making       77 MINUTES SPENT BY ME on the date of service doing chart review, history, exam, documentation & further activities per the note.      Data     I have personally reviewed the following data over the past 24 hrs:    9.3  \   13.7   / 209     140 103 26.5 (H) /  110 (H)   5.0 27 2.13 (H) \     ALT: 48 AST: 25 AP: 76 TBILI: 0.5   ALB: 3.9 TOT PROTEIN: 6.3 (L) LIPASE: N/A     Trop: 13 BNP: N/A     TSH: 2.14 T4: N/A A1C: 6.1 (H)       Imaging results reviewed over the past 24 hrs:   No results found for this or any previous visit (from the past 24 hours).

## 2025-04-29 NOTE — ED TRIAGE NOTES
Pt presents to the ED via EMS from clinic.  Pt presented to clinic after felling unwell for several days.  Pt found to be in SVT in clinic.  EMS called.  Pt was given 324 ASA, adenosine 6 mg x1 and 12 mg x2.  Pt denies pain upon arrival to ED.

## 2025-04-30 ENCOUNTER — APPOINTMENT (OUTPATIENT)
Dept: CARDIOLOGY | Facility: CLINIC | Age: 60
DRG: 309 | End: 2025-04-30
Attending: STUDENT IN AN ORGANIZED HEALTH CARE EDUCATION/TRAINING PROGRAM
Payer: COMMERCIAL

## 2025-04-30 LAB
ANION GAP SERPL CALCULATED.3IONS-SCNC: 9 MMOL/L (ref 7–15)
ATRIAL RATE - MUSE: 87 BPM
BUN SERPL-MCNC: 21 MG/DL (ref 8–23)
CALCIUM SERPL-MCNC: 9 MG/DL (ref 8.8–10.4)
CHLORIDE SERPL-SCNC: 103 MMOL/L (ref 98–107)
CHOLEST SERPL-MCNC: 77 MG/DL
CREAT SERPL-MCNC: 1.71 MG/DL (ref 0.67–1.17)
DIASTOLIC BLOOD PRESSURE - MUSE: NORMAL MMHG
EGFRCR SERPLBLD CKD-EPI 2021: 45 ML/MIN/1.73M2
ERYTHROCYTE [DISTWIDTH] IN BLOOD BY AUTOMATED COUNT: 14 % (ref 10–15)
GLUCOSE SERPL-MCNC: 116 MG/DL (ref 70–99)
HCO3 SERPL-SCNC: 27 MMOL/L (ref 22–29)
HCT VFR BLD AUTO: 43.1 % (ref 40–53)
HDLC SERPL-MCNC: 27 MG/DL
HGB BLD-MCNC: 13.7 G/DL (ref 13.3–17.7)
INTERPRETATION ECG - MUSE: NORMAL
LDLC SERPL CALC-MCNC: 22 MG/DL
LVEF ECHO: NORMAL
MCH RBC QN AUTO: 30.1 PG (ref 26.5–33)
MCHC RBC AUTO-ENTMCNC: 31.8 G/DL (ref 31.5–36.5)
MCV RBC AUTO: 95 FL (ref 78–100)
NONHDLC SERPL-MCNC: 50 MG/DL
P AXIS - MUSE: -72 DEGREES
PLATELET # BLD AUTO: 187 10E3/UL (ref 150–450)
POTASSIUM SERPL-SCNC: 4.9 MMOL/L (ref 3.4–5.3)
PR INTERVAL - MUSE: 234 MS
QRS DURATION - MUSE: 92 MS
QT - MUSE: 346 MS
QTC - MUSE: 416 MS
R AXIS - MUSE: 102 DEGREES
RBC # BLD AUTO: 4.55 10E6/UL (ref 4.4–5.9)
SODIUM SERPL-SCNC: 139 MMOL/L (ref 135–145)
SYSTOLIC BLOOD PRESSURE - MUSE: NORMAL MMHG
T AXIS - MUSE: 69 DEGREES
TRIGL SERPL-MCNC: 140 MG/DL
VENTRICULAR RATE- MUSE: 87 BPM
WBC # BLD AUTO: 8.2 10E3/UL (ref 4–11)

## 2025-04-30 PROCEDURE — G0378 HOSPITAL OBSERVATION PER HR: HCPCS

## 2025-04-30 PROCEDURE — 999N000208 ECHOCARDIOGRAM COMPLETE

## 2025-04-30 PROCEDURE — 210N000002 HC R&B HEART CARE

## 2025-04-30 PROCEDURE — 99233 SBSQ HOSP IP/OBS HIGH 50: CPT | Performed by: STUDENT IN AN ORGANIZED HEALTH CARE EDUCATION/TRAINING PROGRAM

## 2025-04-30 PROCEDURE — 255N000002 HC RX 255 OP 636: Performed by: STUDENT IN AN ORGANIZED HEALTH CARE EDUCATION/TRAINING PROGRAM

## 2025-04-30 PROCEDURE — 93010 ELECTROCARDIOGRAM REPORT: CPT | Performed by: INTERNAL MEDICINE

## 2025-04-30 PROCEDURE — 93005 ELECTROCARDIOGRAM TRACING: CPT

## 2025-04-30 PROCEDURE — 36415 COLL VENOUS BLD VENIPUNCTURE: CPT | Performed by: STUDENT IN AN ORGANIZED HEALTH CARE EDUCATION/TRAINING PROGRAM

## 2025-04-30 PROCEDURE — 250N000013 HC RX MED GY IP 250 OP 250 PS 637: Performed by: STUDENT IN AN ORGANIZED HEALTH CARE EDUCATION/TRAINING PROGRAM

## 2025-04-30 PROCEDURE — 85027 COMPLETE CBC AUTOMATED: CPT | Performed by: STUDENT IN AN ORGANIZED HEALTH CARE EDUCATION/TRAINING PROGRAM

## 2025-04-30 PROCEDURE — 93306 TTE W/DOPPLER COMPLETE: CPT | Mod: 26 | Performed by: INTERNAL MEDICINE

## 2025-04-30 PROCEDURE — 99223 1ST HOSP IP/OBS HIGH 75: CPT | Mod: 25 | Performed by: NURSE PRACTITIONER

## 2025-04-30 PROCEDURE — 80048 BASIC METABOLIC PNL TOTAL CA: CPT | Performed by: STUDENT IN AN ORGANIZED HEALTH CARE EDUCATION/TRAINING PROGRAM

## 2025-04-30 PROCEDURE — 99223 1ST HOSP IP/OBS HIGH 75: CPT | Mod: FS | Performed by: PHYSICIAN ASSISTANT

## 2025-04-30 PROCEDURE — 99418 PROLNG IP/OBS E/M EA 15 MIN: CPT | Mod: FS | Performed by: PHYSICIAN ASSISTANT

## 2025-04-30 RX ORDER — CARVEDILOL 25 MG/1
25 TABLET ORAL ONCE
Status: COMPLETED | OUTPATIENT
Start: 2025-04-30 | End: 2025-04-30

## 2025-04-30 RX ORDER — CARVEDILOL 25 MG/1
50 TABLET ORAL 2 TIMES DAILY WITH MEALS
Status: DISCONTINUED | OUTPATIENT
Start: 2025-04-30 | End: 2025-05-02 | Stop reason: HOSPADM

## 2025-04-30 RX ORDER — LISINOPRIL 20 MG/1
20 TABLET ORAL DAILY
Status: DISCONTINUED | OUTPATIENT
Start: 2025-04-30 | End: 2025-05-02 | Stop reason: HOSPADM

## 2025-04-30 RX ORDER — DIAZEPAM 5 MG/1
5 TABLET ORAL
Status: COMPLETED | OUTPATIENT
Start: 2025-04-30 | End: 2025-04-30

## 2025-04-30 RX ADMIN — ASPIRIN 81 MG CHEWABLE TABLET 81 MG: 81 TABLET CHEWABLE at 07:49

## 2025-04-30 RX ADMIN — PERFLUTREN 10 ML: 6.52 INJECTION, SUSPENSION INTRAVENOUS at 11:21

## 2025-04-30 RX ADMIN — LEVOTHYROXINE SODIUM 75 MCG: 75 TABLET ORAL at 07:50

## 2025-04-30 RX ADMIN — CARVEDILOL 25 MG: 25 TABLET, FILM COATED ORAL at 13:46

## 2025-04-30 RX ADMIN — CARVEDILOL 25 MG: 25 TABLET, FILM COATED ORAL at 07:50

## 2025-04-30 RX ADMIN — CARVEDILOL 50 MG: 25 TABLET, FILM COATED ORAL at 22:07

## 2025-04-30 RX ADMIN — DIAZEPAM 5 MG: 5 TABLET ORAL at 23:09

## 2025-04-30 RX ADMIN — ALLOPURINOL 300 MG: 300 TABLET ORAL at 07:50

## 2025-04-30 RX ADMIN — LISINOPRIL 20 MG: 20 TABLET ORAL at 15:48

## 2025-04-30 RX ADMIN — ATORVASTATIN CALCIUM 40 MG: 40 TABLET, FILM COATED ORAL at 07:50

## 2025-04-30 ASSESSMENT — ACTIVITIES OF DAILY LIVING (ADL)
ADLS_ACUITY_SCORE: 41
ADLS_ACUITY_SCORE: 36
ADLS_ACUITY_SCORE: 42
ADLS_ACUITY_SCORE: 41
ADLS_ACUITY_SCORE: 42
ADLS_ACUITY_SCORE: 36
ADLS_ACUITY_SCORE: 42
ADLS_ACUITY_SCORE: 42
ADLS_ACUITY_SCORE: 36
ADLS_ACUITY_SCORE: 42
ADLS_ACUITY_SCORE: 36
ADLS_ACUITY_SCORE: 42
ADLS_ACUITY_SCORE: 42
ADLS_ACUITY_SCORE: 36
ADLS_ACUITY_SCORE: 36
ADLS_ACUITY_SCORE: 42
ADLS_ACUITY_SCORE: 36
ADLS_ACUITY_SCORE: 42
ADLS_ACUITY_SCORE: 36
ADLS_ACUITY_SCORE: 42

## 2025-04-30 NOTE — CONSULTS
Patient has [employer] through an employer.    Xarelto/Eliquis:  $60/mo. Upon receipt of RX Discharge Pharmacy can provide copay savings card from [employer] or [employer], respectively, to reduce this to $10/mo.    Kirti Galvez  Pharmacy Technician/Liaison, Discharge Pharmacy   [phone] (voice or text)  [email or web address]  Pharmacy test claims are estimates and may not reflect final costs.  Suggested alternatives aim to be cost-effective and may not be therapeutically equivalent as this consult is informational and does not constitute medical advice.  Clinical decisions should be made by qualified healthcare providers.

## 2025-04-30 NOTE — ED NOTES
"RN spoke with pt about finding him a hospital bed for the night for more comfort, pt responds with \"I've slept on garage floors before\"  "

## 2025-04-30 NOTE — CONSULTS
[facility]    Cardiology Consultation     Date of Admission:  4/29/2025    Assessment & Plan   Michael Esteban is a 59 year old male who was admitted on 4/29/2025 with new dizziness, palpitations, blurry vision, new atrial flutter, new cardiomyopathy and findings of possible chronic infarct on head CT.  See HPI below.    Assessment:    Atrial flutter / Afib - rapid initially, slowed with dilitaizem in ER. On coreg, rates 80-90 currently  New cardiomyopathy, LVEF 40-45 with with apical hypokinesis including the distal anterior, anterolateral, and inferolateral wall  Ischemic stroke on CT, suggestive of chronic, awaiting MRI to help determine etiology and chronicity and confirmation of no acute concerns   Dizziness, memory issues  HTN  History of ascending aortic aneurysm s/p repair with Gelweave 30 mm interposition graft and PFO closure in 2015  AMAYA   Tobacco abuse     Plan:   Awaiting brain MRI and neurology input for anticoagulation okay  Consider IV heparin protocol first in case ischemia evaluation pursued this admission if stroke felt to be out of subacute window   Consider Nuc stress test to risk stratify tomorrow. Will make NPO at midnight.  Continue rate control for atrial flutter for now, can consider rhythm control pending above once able to safely be on anticoagulation   Continue aspirin, coreg  Lisinopril on hold currently with AMAYA     High complexity     Discussed with Dr. Garcia who will see patient and update orders as needed.     I spent 25 minutes on the date of the encounter which included face to face time, review of cardiac testing, pertinent imaging, lab work and coordination of care.       NICK Miller CNP     Primary Care Physician   Pricila Coburn    Reason for Consult   Reason for consult: I was asked by Dr. Crump to evaluate this patient for atrial fibrillation and wall motion abnormalities.    History of Present Illness   Michael Esteban  is a 59 year old male with past medical history of ascending aortic aneurysm s/p repair with Gelweave 30 mm interposition graft and PFO closure in 2015 (normal aortic valve), mild prox RCA plaque at time per aortic repair, solitary kidney as he was kidney donor in 1980's, gout, hypothyroidism, HTN, HLD, BERNADETTE on CPAP, tobacco abuse who presented 4/29/25 for several days of palpitations and dizziness with a brief near syncopal episode without LOC. He was seen in urgent care initially and and sent to the ER via EMS. Per ER notes EMS administered 6 mg adenosine and then 12 mg adenosine x 2 with flutter waves on EKG.  They stated that the patient had a heart rate between 176-180 during transit with variation between 150-180 after being given 400 ML fluids, further noting a manual blood pressure of 110/62. Patient had recently been leg go from his job as a  for new onset difficulty with tasks. Upon arrival in AFl with RVR. He was given 10mg diltiazem and infusion and later converted to sinus rhythm. AMAYA with creatinine 2.1, HS troponin 13. Head CT showed  chronic appearing right parietal infarct, no definite acute infarct or hemorrhage, CTA neck with mild carotid and vertebral artery atherosclerosis. Brain MRI pending. Neurology was consulted. Echocardiogram with LVEF 40-45% with apical hypokinesis including the distal anterior, anterolateral, and  inferolateral walls.. Cardiology consulted.     Resting in room. Some notable palpitations over the past month along with dizziness, last evening with near syncopal episode. No chest pain. Some increased shortness of breath with exertion. Still smokes some cigars in his pipe.  His memory has been poor during this time, forgetting things. Unfortunately lost his job. His sister confirms at bedside. Lives with his wife, not present for visit.       Past Medical History   Past Medical History:   Diagnosis Date    Gout     Hyperlipidemia     Hypertension     Hypothyroidism      Kidney donor 1992     donated Left kidney to mother in 1992    Morbid (severe) obesity due to excess calories (H)     comorbid w/: Htn, Erectile dysfunction, Swelling of legs, Excessively sleep during the day, Awaken from sleep to catch breath, Shortness of Breath with activity, Loud snoring, Stop breathing while asleep.     Thoracic aortic aneurysm without rupture     4.7 cm - s/p repair 2/2015       Past Surgical History   Past Surgical History:   Procedure Laterality Date    ARTHROSCOPY KNEE Right 2005     COLONOSCOPY N/A 1/24/2017    Procedure: COLONOSCOPY;  Surgeon: Maikol Faulkner MD;  Location: [facility]    REPAIR ANEURYSM ASCENDING AORTA N/A 2/17/2015    Procedure: REPAIR ANEURYSM ASCENDING AORTA;  Surgeon: Monae Lechuga MD;  Location: [facility]    REPAIR PATENT FORAMEN OVALE N/A 2/17/2015    Procedure: REPAIR PATENT FORAMEN OVALE;  Surgeon: Monae Lechuga MD;  Location: [facility] OR    s/p left nephrectomy - donor to mother  1992    TONSILLECTOMY  age 3        Prior to Admission Medications   Prior to Admission Medications   Prescriptions Last Dose Informant Patient Reported? Taking?   acetaminophen (TYLENOL) 500 MG tablet  Self Yes Yes   Sig: Take 500 mg by mouth every 6 hours as needed for mild pain   allopurinol (ZYLOPRIM) 300 MG tablet 4/29/2025 Morning Self Yes Yes   Sig: Take 300 mg by mouth daily.   aspirin 81 MG chewable tablet 4/29/2025 Morning Self No Yes   Sig: Take 1 tablet (81 mg) by mouth daily   atorvastatin (LIPITOR) 40 MG tablet 4/29/2025 Morning Self No Yes   Sig: TAKE 1 TABLET (40 MG) BY MOUTH AT BEDTIME FOR CHOLESTEROL   carvedilol (COREG) 25 MG tablet 4/29/2025 Morning Self No Yes   Sig: TAKE 1 TABLET BY MOUTH TWICE A DAY WITH MEALS   levothyroxine (SYNTHROID/LEVOTHROID) 75 MCG tablet 4/29/2025 Morning Self No Yes   Sig: Take 1 tablet (75 mcg) by mouth daily   lisinopril (ZESTRIL) 20 MG tablet 4/29/2025 Morning Self No Yes   Sig: TAKE 1 TABLET BY MOUTH EVERY DAY   order for DME  Self Yes  No   Sig: Equipment being ordered: RESMED AIRSENSE 10 WITH HH AND RESMED AIRFIT P10 PILLOW MASK.   sildenafil (VIAGRA) 50 MG tablet  Self No No   Sig: Take 1 tablet (50 mg) by mouth daily as needed (for erectile dysfunction) 30 min to 4 hrs before sex. Do not use with nitroglycerin, terazosin or doxazosin.      Facility-Administered Medications: None     Current Facility-Administered Medications   Medication Dose Route Frequency Provider Last Rate Last Admin    acetaminophen (TYLENOL) tablet 650 mg  650 mg Oral Q4H PRN Jacqueline Crump,         Or    acetaminophen (TYLENOL) Suppository 650 mg  650 mg Rectal Q4H PRN Jacqueline Crump, DO        allopurinol (ZYLOPRIM) tablet 300 mg  300 mg Oral Daily Jacqueline Crump, DO   300 mg at 04/30/25 0750    aspirin (ASA) chewable tablet 81 mg  81 mg Oral Daily Jacqueline Crump, DO   81 mg at 04/30/25 0749    atorvastatin (LIPITOR) tablet 40 mg  40 mg Oral Daily Jacqueline Crump, DO   40 mg at 04/30/25 0750    carvedilol (COREG) tablet 50 mg  50 mg Oral BID w/meals Jacqueline Crump,         diazepam (VALIUM) tablet 5 mg  5 mg Oral Once PRN Jacqueline Crump,         levothyroxine (SYNTHROID/LEVOTHROID) tablet 75 mcg  75 mcg Oral Daily Jacqueline Crump, DO   75 mcg at 04/30/25 0750    lidocaine (LMX4) cream   Topical Q1H PRN Jacqueline Crump,         lidocaine 1 % 0.1-1 mL  0.1-1 mL Other Q1H PRN Jacqueline Crump,         lisinopril (ZESTRIL) tablet 20 mg  20 mg Oral Daily Jacqueline Crump,         ondansetron (ZOFRAN ODT) ODT tab 4 mg  4 mg Oral Q6H PRN Jacqueline Crump,         Or    ondansetron (ZOFRAN) injection 4 mg  4 mg Intravenous Q6H PRN Jacqueline Crump,         prochlorperazine (COMPAZINE) injection 10 mg  10 mg Intravenous Q6H PRN Jacqueline Crump,         Or    prochlorperazine (COMPAZINE) tablet 10 mg  10 mg Oral Q6H PRN Jacqueline Crump DO        senna-docusate (SENOKOT-S/PERICOLACE) 8.6-50 MG per tablet 1 tablet  1  tablet Oral BID PRN Jacqueline Crump,         Or    senna-docusate (SENOKOT-S/PERICOLACE) 8.6-50 MG per tablet 2 tablet  2 tablet Oral BID PRN Jacqueline Crump,         sodium chloride (PF) 0.9% PF flush 3 mL  3 mL Intracatheter q1 min prn Jacqueline Crump,          Current Facility-Administered Medications   Medication Dose Route Frequency Provider Last Rate Last Admin    acetaminophen (TYLENOL) tablet 650 mg  650 mg Oral Q4H PRN Jacqueline Crump DO        Or    acetaminophen (TYLENOL) Suppository 650 mg  650 mg Rectal Q4H PRN Jacqueline Crump,         allopurinol (ZYLOPRIM) tablet 300 mg  300 mg Oral Daily Jacqueline Crump, DO   300 mg at 04/30/25 0750    aspirin (ASA) chewable tablet 81 mg  81 mg Oral Daily Jacqueline Crump, DO   81 mg at 04/30/25 0749    atorvastatin (LIPITOR) tablet 40 mg  40 mg Oral Daily Jacqueline Crump, DO   40 mg at 04/30/25 0750    carvedilol (COREG) tablet 50 mg  50 mg Oral BID w/meals Jacqueline Crump,         diazepam (VALIUM) tablet 5 mg  5 mg Oral Once PRN Jacqueline Crump DO        levothyroxine (SYNTHROID/LEVOTHROID) tablet 75 mcg  75 mcg Oral Daily Jacqueline Crump, DO   75 mcg at 04/30/25 0750    lidocaine (LMX4) cream   Topical Q1H PRN Jacqueline Crump DO        lidocaine 1 % 0.1-1 mL  0.1-1 mL Other Q1H PRN Jacqueline Crump,         lisinopril (ZESTRIL) tablet 20 mg  20 mg Oral Daily Jacqueline Crump,         ondansetron (ZOFRAN ODT) ODT tab 4 mg  4 mg Oral Q6H PRN Jacqueline Crump,         Or    ondansetron (ZOFRAN) injection 4 mg  4 mg Intravenous Q6H PRN Jacqueline Crump,         prochlorperazine (COMPAZINE) injection 10 mg  10 mg Intravenous Q6H PRN Jacqueline Crump,         Or    prochlorperazine (COMPAZINE) tablet 10 mg  10 mg Oral Q6H PRN Jacqueline Crump DO        senna-docusate (SENOKOT-S/PERICOLACE) 8.6-50 MG per tablet 1 tablet  1 tablet Oral BID PRN Jacqueline Crump DO        Or    brittney-geminite  (SENOKOT-S/PERICOLACE) 8.6-50 MG per tablet 2 tablet  2 tablet Oral BID PRN Alisia Jacqueline E, DO        sodium chloride (PF) 0.9% PF flush 3 mL  3 mL Intracatheter q1 min prn Jacqueline Crump E, DO         Allergies   Allergies   Allergen Reactions    Thalidomide      Reaction during stress test         Social History    reports that he has been smoking pipe and cigars. He has never used smokeless tobacco. He reports current alcohol use. He reports that he does not use drugs.      Family History   I have reviewed this patient's family history and updated it with pertinent information if needed.  Family History   Problem Relation Age of Onset    Hypertension Mother     Genitourinary Problems Mother         renal failure - lived for 17 years after transplant from pt     Cardiovascular Father         MI at age 59 - heavy smoker     Sleep Apnea Father     Cardiovascular Paternal Grandfather 62        TAA - rupture/     Hypertension Sister        Review of Systems   A comprehensive review of system was performed and is negative other than that noted in the HPI or here.     Physical Exam   Vital Signs with Ranges  Temp:  [97.8  F (36.6  C)-98.3  F (36.8  C)] 97.8  F (36.6  C)  Pulse:  [] 87  Resp:  [15-30] 24  BP: (109-162)/() 162/122  SpO2:  [95 %-98 %] 96 %  Wt Readings from Last 4 Encounters:   20 (!) 166 kg (366 lb)   20 (!) 156.9 kg (346 lb)   19 (!) 153.3 kg (338 lb)   19 (!) 153.3 kg (338 lb)     No intake/output data recorded.      Vitals: BP (!) 162/122   Pulse 87   Temp 97.8  F (36.6  C) (Oral)   Resp 24   SpO2 96%     Physical Exam:   General - Alert and oriented to  place and person in no acute distress; forgetful on how long he has been in the hospital  Cardiovascular - Regular, S1, S2  Extremities - There is trace LE edema at sockline   Respiratory - Regular, non labored, LS clear   Skin - No pallor or cyanosis  Gastrointestinal - Obese   Psych - Appropriate  "affect     No lab results found in last 7 days.    Invalid input(s): \"TROPONINIES\"    Recent Labs   Lab 04/30/25  0827 04/29/25  1723   WBC 8.2 9.3   HGB 13.7 13.7   MCV 95 93    209    140   POTASSIUM 4.9 5.0   CHLORIDE 103 103   CO2 27 27   BUN 21.0 26.5*   CR 1.71* 2.13*   GFRESTIMATED 45* 35*   ANIONGAP 9 10   CATY 9.0 9.2   * 110*   ALBUMIN  --  3.9   PROTTOTAL  --  6.3*   BILITOTAL  --  0.5   ALKPHOS  --  76   ALT  --  48   AST  --  25     Recent Labs   Lab Test 04/29/25  1942 01/08/20  1148   CHOL 77 144   HDL 27* 35*   LDL 22 80   TRIG 140 147     Recent Labs   Lab 04/30/25  0827 04/29/25  1723   WBC 8.2 9.3   HGB 13.7 13.7   HCT 43.1 42.9   MCV 95 93    209     No results for input(s): \"PH\", \"PHV\", \"PO2\", \"PO2V\", \"SAT\", \"PCO2\", \"PCO2V\", \"HCO3\", \"HCO3V\" in the last 168 hours.  No results for input(s): \"NTBNPI\", \"NTBNP\" in the last 168 hours.  No results for input(s): \"DD\" in the last 168 hours.  No results for input(s): \"SED\", \"CRP\" in the last 168 hours.  Recent Labs   Lab 04/30/25  0827 04/29/25  1723    209     Recent Labs   Lab 04/29/25  1723   TSH 2.14     No results for input(s): \"COLOR\", \"APPEARANCE\", \"URINEGLC\", \"URINEBILI\", \"URINEKETONE\", \"SG\", \"UBLD\", \"URINEPH\", \"PROTEIN\", \"UROBILINOGEN\", \"NITRITE\", \"LEUKEST\", \"RBCU\", \"WBCU\" in the last 168 hours.    Imaging:  Recent Results (from the past 48 hours)   Head CT w/o contrast    Narrative    HEAD AND NECK CT ANGIOGRAM WITH IV CONTRAST  [facility]  4/29/2025 6:29 PM CDT    INDICATION: Increased confusion for the last several weeks, new diagnosis A fib, evaluate for evidence of stroke  TECHNIQUE: Head and neck CT angiogram with IV contrast. Noncontrast head CT followed by axial helical CT images of the head and neck vessels obtained during the arterial phase of intravenous contrast administration. Axial helical 2D reconstructed images   and multiplanar 3D MIP reconstructed images of the head and neck " vessels were performed by the technologist. Dose reduction techniques were used. Vessel stenoses reported according to NASCET criteria.  CONTRAST: 69 mL Isovue 370  COMPARISON: None.    FINDINGS:  NONCONTRAST HEAD CT: Chronic appearing right parietal infarct. No definite acute infarct. No hemorrhage. The ventricles and sulci are normal for age. Osseous structures are intact. Mild inflammatory mucosal thickening in the sinuses. The mastoid air   cells are free of significant disease. The orbits are unremarkable.    HEAD CTA: The intracranial circulation is patent without evidence for aneurysm, proximal vessel occlusion, high-grade intracranial stenosis or arteriovenous malformation.  Patent dural venous sinuses.    NECK CTA: Three vessel arch.  Carotid arteries are patent with mild atherosclerotic change.  No hemodynamically significant stenosis by NASCET criteria in either carotid system.  Mild vertebral artery atherosclerosis. No significant narrowing or   dissection.      Impression    CONCLUSION:  HEAD CT:  1.  Chronic appearing right parietal infarct. No definite acute infarct.  2.  No hemorrhage.    HEAD CTA:  1.  Negative.  2.  No vessel stenosis, occlusion or aneurysm.    NECK CTA:  1.  Mild carotid and vertebral artery atherosclerosis.  2.  No dissection or hemodynamically significant narrowing in the neck by NASCET criteria.     CTA Head Neck with Contrast    Narrative    HEAD AND NECK CT ANGIOGRAM WITH IV CONTRAST  [facility]  4/29/2025 6:29 PM CDT    INDICATION: Increased confusion for the last several weeks, new diagnosis A fib, evaluate for evidence of stroke  TECHNIQUE: Head and neck CT angiogram with IV contrast. Noncontrast head CT followed by axial helical CT images of the head and neck vessels obtained during the arterial phase of intravenous contrast administration. Axial helical 2D reconstructed images   and multiplanar 3D MIP reconstructed images of the head and neck vessels  were performed by the technologist. Dose reduction techniques were used. Vessel stenoses reported according to NASCET criteria.  CONTRAST: 69 mL Isovue 370  COMPARISON: None.    FINDINGS:  NONCONTRAST HEAD CT: Chronic appearing right parietal infarct. No definite acute infarct. No hemorrhage. The ventricles and sulci are normal for age. Osseous structures are intact. Mild inflammatory mucosal thickening in the sinuses. The mastoid air   cells are free of significant disease. The orbits are unremarkable.    HEAD CTA: The intracranial circulation is patent without evidence for aneurysm, proximal vessel occlusion, high-grade intracranial stenosis or arteriovenous malformation.  Patent dural venous sinuses.    NECK CTA: Three vessel arch.  Carotid arteries are patent with mild atherosclerotic change.  No hemodynamically significant stenosis by NASCET criteria in either carotid system.  Mild vertebral artery atherosclerosis. No significant narrowing or   dissection.      Impression    CONCLUSION:  HEAD CT:  1.  Chronic appearing right parietal infarct. No definite acute infarct.  2.  No hemorrhage.    HEAD CTA:  1.  Negative.  2.  No vessel stenosis, occlusion or aneurysm.    NECK CTA:  1.  Mild carotid and vertebral artery atherosclerosis.  2.  No dissection or hemodynamically significant narrowing in the neck by NASCET criteria.     XR Chest Port 1 View    Narrative    EXAM: XR CHEST PORT 1 VIEW  LOCATION: [facility]  DATE: 4/29/2025    INDICATION: SVT  COMPARISON: None.      Impression    IMPRESSION:  1.  Clear lungs. No beth pulmonary edema.  2.  No pleural effusion or pneumothorax.  3.  Mild cardiomegaly.  4.  Widened vascular pedicle and pulmonary venous congestion suggesting fluid overload.      Echocardiogram Complete   Result Value    LVEF  40-45%    Narrative    [id number]  [id number]  [id number]  [id number]^ASHLEY^LEA^E     [facility]  Echocardiography Laboratory  [id number]  [place] [address]     Name: JOSEFINA LEDEZMA  MRN: [id number]  : 1965  Study Date: 2025 10:55 AM  Age: 59 yrs  Gender: Male  Patient Location: [facility]  Reason For Study: Aflutter  Ordering Physician: LEA RAMIREZ  Referring Physician: Pricila Coburn MD  Performed By: Stephanie Friedman     BSA: 2.7 m2  Height: 70 in  Weight: 366 lb  HR: 89  BP: 120/86 mmHg  ______________________________________________________________________________  Procedure  Echocardiogram with two-dimensional, color and spectral Doppler. Definity (NDC  #[id number]) given intravenously.  ______________________________________________________________________________  Interpretation Summary     Left ventricular systolic function is mild to moderately reduced.  The visual ejection fraction is 40-45%.  Apical hypokinesis, including the distal anterior, anterolateral, and  inferolateral walls.  Aortic root dilatation is present, 4.3 cm  Ascending aorta dilatation is present, 4.0 cm  No significant valve disease.  The study was technically difficult. Compared to the previous echo from 2017,  the LV function has decreased, WMA are new, and the aorta is unchanged.  ______________________________________________________________________________  Left Ventricle  The left ventricle is normal in size. There is normal left ventricular wall  thickness. Diastolic Doppler findings (E/E' ratio and/or other parameters)  suggest left ventricular filling pressures are indeterminate. Left ventricular  systolic function is mild to moderately reduced. The visual ejection fraction  is 40-45%. Apical hypokinesis, including the distal anterior, anterolateral,  and inferolateral walls.     Right Ventricle  The right ventricle is normal in structure, function and size.     Atria  Normal left atrial size. Right atrial size is normal. There is no atrial shunt  seen.     Mitral Valve  The mitral valve is normal in structure and  function. There is trace mitral  regurgitation.     Tricuspid Valve  The tricuspid valve is normal in structure and function. There is trace  tricuspid regurgitation. Right ventricular systolic pressure could not be  approximated due to inadequate tricuspid regurgitation. IVC diameter and  respiratory changes fall into an intermediate range suggesting an RA pressure  of 8 mmHg.     Aortic Valve  The aortic valve is normal in structure and function. No aortic regurgitation  is present. No aortic stenosis is present.     Pulmonic Valve  The pulmonic valve is not well seen, but is grossly normal. There is trace  pulmonic valvular regurgitation.     Vessels  Aortic root dilatation is present. 4.3 cm. Ascending aorta dilatation is  present. 4.0 cm.     Pericardium  There is no pericardial effusion.     ______________________________________________________________________________  MMode/2D Measurements & Calculations  IVSd: 1.2 cm  LVIDd: 5.4 cm  LVIDs: 4.1 cm  LVPWd: 1.1 cm  FS: 24.1 %  LV mass(C)d: 249.4 grams  LV mass(C)dI: 92.4 grams/m2     Ao root diam: 4.3 cm  asc Aorta Diam: 4.0 cm  Ao root diam index Ht(cm/m): 2.4  Ao root diam index BSA (cm/m2): 1.6  Asc Ao diam index BSA (cm/m2): 1.5  Asc Ao diam index Ht(cm/m): 2.2  EF Biplane: 45.0 %  RWT: 0.41  TAPSE: 2.6 cm     Doppler Measurements & Calculations  MV E max derik: 104.5 cm/sec  MV dec slope: 562.7 cm/sec2  MV dec time: 0.19 sec  PA acc time: 0.12 sec  E/E' avg: 10.8  Lateral E/e': 10.1     Medial E/e': 11.4  RV S Derik: 7.7 cm/sec     ______________________________________________________________________________  Report approved by: Nahum Nichols MD on 04/30/2025 12:31 PM             Echo:  No results found for this or any previous visit (from the past 4320 hours).    Clinically Significant Risk Factors Present on Admission                 # Drug Induced Platelet Defect: home medication list includes an antiplatelet medication   # Hypertension: Noted on problem  list                 Cardiac Arrhythmia: Atrial fibrillation: Persistent  Atrial flutter: Unspecified  Cardiomyopathy  Acute kidney failure, unspecified  Chronic Fatigue and Other Debilities: Other reduced mobility

## 2025-04-30 NOTE — CONSULTS
[facility]    Stroke Consult Note    Reason for Consult:  chronic infarct on head CT with new atrial fibrillation    Chief Complaint: Tachycardia     HPI  Michael Esteban is a 59 year old male with PMH of AAA without rupture s/p repair (2015), live kidney donor s/p nephrectomy, gout, hypothyroidism, HTN, HLD, BERNADETTE on CPAP, tobacco abuse who presented 4/29/25 for several days of palpitations and dizziness with syncopal episode x1. He was found to be in Afib with RVR while in clinic and sent to [facility] ED via EMS. On PTA ASA 81mg.    He reports dizziness, heart racing, and blurry vision for about a week. The dizziness feels like he's going to pass out, and has resulted in one brief syncopal episode without LOC 4/29/25. He thinks the blurry vision might have started last week when he was working in his garage. During the past week, he also mentions having memory issues/confusion figuring out how to perform tasks at work, which resulted in him losing his job (). Denies headache, slurred speech, extremity weakness or numbness, nausea/vomiting. He has not had similar symptoms before. No history of stroke.    Stroke Evaluation Summarized    MRI/Head CT MRI Brain: pending    CT Head 4/29/25: chronic appearing right parietal infarct, no definite acute infarct or hemorrhage   Intracranial Vasculature CTA 4/29/25: no significant stenosis   Cervical Vasculature CTA 4/29/25: mild carotid and vertebral artery atherosclerosis     Echocardiogram 4/30/2025: Decreased LVEF to 40-45%, new apical hypokinesis, stable aortic root and ascending aorta dilation (compared to 2017)   EKG/Telemetry 4/29/2025: Atrial flutter   Other Testing Not Applicable     LDL  4/29/2025: 22 mg/dL   A1C  4/29/2025: 6.1 %   Troponin 4/29/2025: 11 ng/L       Impression  Ischemic stroke of right parietal lobe - appearance on CT suggests chronic, MRI is pending - etiology undetermined.   New diagnosis of atrial  "fibrillation/flutter    Regardless of stroke chronicity, CHADS VASC2 is 3 at a minimum indicating that anticoagulation is indicated for secondary stroke prevention.    Recommendations   - Long term outpatient BP goal <130/80 with tighter control associated with decreased overall CV risk, if tolerated. No clear indication for permissive HTN at this time.  - PTA on carvedilol 25mg, lisinopril 20mg  - Antithrombolytics: on PTA aspirin 81mg for now, MRI results pending. CHADS VASC2 is 3 at a minimum indicating that anticoagulation is indicated for secondary stroke prevention. Pharmacy Liaison consult ordered.   - Statin: PTA atorvastatin 40mg, if no acute stroke consider de-escalating statin dose given LDL 22  - PT/OT/SLP. OT cognitive eval.  - Stroke Education  - Euthermia, Euglycemia  - Bedside glucose monitoring  - Hgb A1c goal <7%  - Cardiology consulted, appreciate recommendations    Diagnostics:  - Telemetry  - MRI brain with and without contrast      Patient Follow-up    - final recommendation pending work-up    Thank you for this consult.  Will continue to follow for MRI results. If no acute stroke then will sign off.    Discussed with Stroke Staff, Dr. Hernandez.    Mercedez Matthew  MS3, [employer]    Patient seen during team rounds. I agree with the information in this note.     Megan Mayfield PA-C  Vascular Neurology    To page a member of the stroke/neurocritical care service, click here:  AMCOM   Choose \"On Call\" tab at top, then search dropdown box for \"Neurology Adult\", select location, press Enter, then look for stroke/neuro ICU/telestroke.  _____________________________________________________    Clinically Significant Risk Factors Present on Admission                 # Drug Induced Platelet Defect: home medication list includes an antiplatelet medication   # Hypertension: Noted on problem list                      Past Medical History    Past Medical History:   Diagnosis " Date    Gout     Hyperlipidemia     Hypertension     Hypothyroidism     Kidney donor 1992     donated Left kidney to mother in 1992    Morbid (severe) obesity due to excess calories (H)     comorbid w/: Htn, Erectile dysfunction, Swelling of legs, Excessively sleep during the day, Awaken from sleep to catch breath, Shortness of Breath with activity, Loud snoring, Stop breathing while asleep.     Thoracic aortic aneurysm without rupture     4.7 cm - s/p repair 2/2015     Medications   Home Meds  Prior to Admission medications    Medication Sig Start Date End Date Taking? Authorizing Provider   acetaminophen (TYLENOL) 500 MG tablet Take 500 mg by mouth every 6 hours as needed for mild pain   Yes Reported, Patient   allopurinol (ZYLOPRIM) 300 MG tablet Take 300 mg by mouth daily.   Yes Unknown, Entered By History   aspirin 81 MG chewable tablet Take 1 tablet (81 mg) by mouth daily 8/18/15  Yes Laura Austin MD   atorvastatin (LIPITOR) 40 MG tablet TAKE 1 TABLET (40 MG) BY MOUTH AT BEDTIME FOR CHOLESTEROL 2/5/20  Yes Laura Austin MD   carvedilol (COREG) 25 MG tablet TAKE 1 TABLET BY MOUTH TWICE A DAY WITH MEALS 1/8/20  Yes Laura Austin MD   levothyroxine (SYNTHROID/LEVOTHROID) 75 MCG tablet Take 1 tablet (75 mcg) by mouth daily 1/8/20  Yes Laura Austin MD   lisinopril (ZESTRIL) 20 MG tablet TAKE 1 TABLET BY MOUTH EVERY DAY 12/23/20  Yes Laura Austin MD   order for DME Equipment being ordered: RESMED AIRSENSE 10 WITH HH AND RESMED AIRFIT P10 PILLOW MASK.    Reported, Patient   sildenafil (VIAGRA) 50 MG tablet Take 1 tablet (50 mg) by mouth daily as needed (for erectile dysfunction) 30 min to 4 hrs before sex. Do not use with nitroglycerin, terazosin or doxazosin. 1/8/20   Laura Austin MD       Scheduled Meds  Current Facility-Administered Medications   Medication Dose Route Frequency Provider Last Rate Last Admin    allopurinol (ZYLOPRIM) tablet 300 mg  300  mg Oral Daily Jacqueline Crump E, DO   300 mg at 04/30/25 0750    aspirin (ASA) chewable tablet 81 mg  81 mg Oral Daily Jacqueline Crump, DO   81 mg at 04/30/25 0749    atorvastatin (LIPITOR) tablet 40 mg  40 mg Oral Daily Janie Crumpson E, DO   40 mg at 04/30/25 0750    carvedilol (COREG) tablet 25 mg  25 mg Oral BID w/meals Alisia Jacqueline E, DO   25 mg at 04/30/25 0750    levothyroxine (SYNTHROID/LEVOTHROID) tablet 75 mcg  75 mcg Oral Daily Jacqueline Crump, DO   75 mcg at 04/30/25 0750       Infusion Meds  Current Facility-Administered Medications   Medication Dose Route Frequency Provider Last Rate Last Admin    diltiazem (CARDIZEM) 125 mg in sodium chloride 0.9 % 125 mL infusion  5-15 mg/hr Intravenous Continuous RhododendronMichael Jones DO   Paused at 04/30/25 0906       Allergies   Allergies   Allergen Reactions    Thalidomide      Reaction during stress test            PHYSICAL EXAMINATION   Temp:  [97.8  F (36.6  C)-98.3  F (36.8  C)] 97.8  F (36.6  C)  Pulse:  [] 88  Resp:  [15-30] 24  BP: (109-144)/() 142/100  SpO2:  [95 %-98 %] 97 %    General Exam  General:  patient lying in bed without any acute distress    HEENT:  normocephalic/atraumatic  Pulmonary:  no respiratory distress  Extremities:  no edema  Skin:  intact, warm/dry     Neuro Exam  Mental Status:  alert, oriented x 3, follows commands, speech clear and fluent, naming and repetition normal, vague/poor historian  Cranial Nerves:  visual fields intact, PERRL, EOMI with normal smooth pursuit, facial sensation intact and symmetric, facial movements symmetric, hearing not formally tested but intact to conversation, palate elevation symmetric and uvula midline, no dysarthria, shoulder shrug strong bilaterally, tongue protrusion midline  Motor:  normal muscle tone and bulk, no abnormal movements, able to move all limbs spontaneously, strength 5/5 throughout upper and lower extremities, no pronator drift  Reflexes:    deferred  Sensory:  light touch sensation intact and symmetric throughout upper and lower extremities  Coordination:  normal finger-to-nose and heel-to-shin bilaterally without dysmetria  Station/Gait:  deferred    Stroke Scales    NIHSS  1a. Level of Consciousness 0-->Alert, keenly responsive   1b. LOC Questions 0-->Answers both questions correctly   1c. LOC Commands 0-->Performs both tasks correctly   2.   Best Gaze 0-->Normal   3.   Visual 0-->No visual loss   4.   Facial Palsy 0-->Normal symmetrical movements   5a. Motor Arm, Left 0-->No drift, limb holds 90 (or 45) degrees for full 10 secs   5b. Motor Arm, Right 0-->No drift, limb holds 90 (or 45) degrees for full 10 secs   6a. Motor Leg, Left 0-->No drift, leg holds 30 degree position for full 5 secs   6b. Motor Leg, right 0-->No drift, leg holds 30 degree position for full 5 secs   7.   Limb Ataxia 0-->Absent   8.   Sensory 0-->Normal, no sensory loss   9.   Best Language 0-->No aphasia, normal   10. Dysarthria 0-->Normal   11. Extinction and Inattention  0-->No abnormality   Total 0 (04/30/25 1300)       Imaging  I personally reviewed all imaging; relevant findings per HPI.    Labs Data   CBC  Recent Labs   Lab 04/30/25  0827 04/29/25  1723   WBC 8.2 9.3   RBC 4.55 4.63   HGB 13.7 13.7   HCT 43.1 42.9    209     Basic Metabolic Panel   Recent Labs   Lab 04/30/25 0827 04/29/25  1723    140   POTASSIUM 4.9 5.0   CHLORIDE 103 103   CO2 27 27   BUN 21.0 26.5*   CR 1.71* 2.13*   * 110*   CATY 9.0 9.2     Liver Panel  Recent Labs   Lab 04/29/25  1723   PROTTOTAL 6.3*   ALBUMIN 3.9   BILITOTAL 0.5   ALKPHOS 76   AST 25   ALT 48     INR  No lab results found.        Stroke Consult Data Data   This was a non-emergent, non-telestroke consult.  I have personally spent a total of 60 minutes providing care today, time spent in reviewing medical records and devising the plan as recorded above.

## 2025-04-30 NOTE — PHARMACY-ADMISSION MEDICATION HISTORY
Pharmacist Admission Medication History    Admission medication history is complete. The information provided in this note is only as accurate as the sources available at the time of the update.    Information Source(s): Patient and CareEverywhere/SureScripts via in-person    Pertinent Information: None    Changes made to PTA medication list:  Added: None  Deleted: None  Changed: allopurinol 100mg to 300mg    Allergies reviewed with patient and updates made in EHR: yes    Medication History Completed By: Ricarda Al RPH 4/29/2025 8:33 PM    PTA Med List   Medication Sig Last Dose/Taking    acetaminophen (TYLENOL) 500 MG tablet Take 500 mg by mouth every 6 hours as needed for mild pain Taking As Needed    allopurinol (ZYLOPRIM) 300 MG tablet Take 300 mg by mouth daily. 4/29/2025 Morning    aspirin 81 MG chewable tablet Take 1 tablet (81 mg) by mouth daily 4/29/2025 Morning    atorvastatin (LIPITOR) 40 MG tablet TAKE 1 TABLET (40 MG) BY MOUTH AT BEDTIME FOR CHOLESTEROL 4/29/2025 Morning    carvedilol (COREG) 25 MG tablet TAKE 1 TABLET BY MOUTH TWICE A DAY WITH MEALS 4/29/2025 Morning    levothyroxine (SYNTHROID/LEVOTHROID) 75 MCG tablet Take 1 tablet (75 mcg) by mouth daily 4/29/2025 Morning    lisinopril (ZESTRIL) 20 MG tablet TAKE 1 TABLET BY MOUTH EVERY DAY 4/29/2025 Morning

## 2025-04-30 NOTE — PROGRESS NOTES
[facility]    Medicine Progress Note - Hospitalist Service    Date of Admission:  4/29/2025    Assessment & Plan   Michael Esteban is a 59 year old male admitted on 4/29/2025 from clinic for palpitation and fainting. He was found to be in Afib with RVR while in clinic and sent to ER via EMS.     New onset Aflutter with RVR  Syncope  Presented to clinic for intermittent symptoms of palpitations, chest tightness and dizziness the last few days. He did have episode of syncope just before his clinic visit. HR found to be in to the 180s SVT vs Afib. He was given adenosine x2 which slowed his rate but he did not convert. Presented to the ER via EMS where HR remained elevated he was started on dilt gtt and convert to NSR. Troponin returned at 14. TSH normal     - weaned off dilt today, will increase home coreg dosing for elevated BP and HR  - TTE returned with mild WMA throughout LV, will ask cardiology to consult. Suspect due to tachycardia given the normal troponin on admit however he has risk factors for CAD  - resume home asa  - can discuss initiation of AC after stroke work up complete    New onset confusion  R parietal CVA  While in the ER he off handily reported he was fired form his [employer] job due to having new on set confusion, memory issues and inability to perform previously normal tasks. Wife confirms new confusion    - CT positive for chronic R parietal stroke. Neurology consulted and recommending Brain MRI (ordered). Will need anticoagulation however his insurance status may make that difficult.  - LDL noted to be quite low so plan to stop statin for now     AMAYA on CKD  Live kidney donor s/p nephrectomy  Cr baseline ~1.5  -  resolved    Essential HTN  - resume coreg at increased dose  - resume home lisinopril     Hypothyroidism  - resume PTA levothyroxine     AAA with repair in 2015  - noted continue PTA statin and asa     Gout  - continue PTA allopurinol     BERNADETTE  - home cpap  settings       Observation Goals: -diagnostic tests and consults completed and resulted, -vital signs normal or at patient baseline, -safe disposition plan has been identified, Nurse to notify provider when observation goals have been met and patient is ready for discharge.  Diet: Regular Diet Adult    DVT Prophylaxis: Pneumatic Compression Devices  Valdez Catheter: Not present  Lines: None     Cardiac Monitoring: ACTIVE order. Indication: AMI (NSTEMI/ STEMI) (48 hours)  Code Status: Full Code      Clinically Significant Risk Factors Present on Admission                 # Drug Induced Platelet Defect: home medication list includes an antiplatelet medication   # Hypertension: Noted on problem list                      Social Drivers of Health    Depression: At risk (3/17/2025)    Received from [facility]    PHQ-2     PHQ-2 Score: 6   Housing Stability: High Risk (4/30/2025)    Housing Stability     Do you have housing? : No     Are you worried about losing your housing?: No   Tobacco Use: High Risk (4/29/2025)    Received from [facility]    Patient History     Smoking Tobacco Use: Some Days     Smokeless Tobacco Use: Never          Disposition Plan     Medically Ready for Discharge: Anticipated Tomorrow             Jacqueline Crump DO  Hospitalist Service  [facility]  Securely message with [employer] (more info)  Text page via [employer] Paging/Directory   ______________________________________________________________________    Interval History   Visited patient early in the morning and updated on the stroke results and need for echo. He wants to leave but also understands he still have medical work up that needs to be completed. He slept bad in the ER. No CPAP machine provided. He denies palpitation or recurrent symptoms.    Later in the day informed by nursing that brief 10 sec episodes of rapid heart rate caught on tele. They were symptomatic    Physical Exam   Vital Signs: Temp: 97.8  F  (36.6  C) Temp src: Oral BP: (!) 142/100 Pulse: 88   Resp: 24 SpO2: 97 % O2 Device: None (Room air)    Weight: 0 lbs 0 oz    Constitutional: Awake, alert, cooperative, no apparent distress  Respiratory: Clear to auscultation bilaterally, no crackles or wheezing  Cardiovascular: Regular rate and rhythm, normal S1 and S2, and no murmur noted  GI: Normal bowel sounds, soft, non-distended, non-tender  Skin/Integumen: No rashes, no cyanosis  Other:      Medical Decision Making       52 MINUTES SPENT BY ME on the date of service doing chart review, history, exam, documentation & further activities per the note.      Data     I have personally reviewed the following data over the past 24 hrs:    8.2  \   13.7   / 187     139 103 21.0 /  116 (H)   4.9 27 1.71 (H) \     ALT: 48 AST: 25 AP: 76 TBILI: 0.5   ALB: 3.9 TOT PROTEIN: 6.3 (L) LIPASE: N/A     Trop: 11 BNP: N/A     TSH: 2.14 T4: N/A A1C: 6.1 (H)       Imaging results reviewed over the past 24 hrs:   Recent Results (from the past 24 hours)   Head CT w/o contrast    Narrative    HEAD AND NECK CT ANGIOGRAM WITH IV CONTRAST  [facility]  4/29/2025 6:29 PM CDT    INDICATION: Increased confusion for the last several weeks, new diagnosis A fib, evaluate for evidence of stroke  TECHNIQUE: Head and neck CT angiogram with IV contrast. Noncontrast head CT followed by axial helical CT images of the head and neck vessels obtained during the arterial phase of intravenous contrast administration. Axial helical 2D reconstructed images   and multiplanar 3D MIP reconstructed images of the head and neck vessels were performed by the technologist. Dose reduction techniques were used. Vessel stenoses reported according to NASCET criteria.  CONTRAST: 69 mL Isovue 370  COMPARISON: None.    FINDINGS:  NONCONTRAST HEAD CT: Chronic appearing right parietal infarct. No definite acute infarct. No hemorrhage. The ventricles and sulci are normal for age. Osseous structures are  intact. Mild inflammatory mucosal thickening in the sinuses. The mastoid air   cells are free of significant disease. The orbits are unremarkable.    HEAD CTA: The intracranial circulation is patent without evidence for aneurysm, proximal vessel occlusion, high-grade intracranial stenosis or arteriovenous malformation.  Patent dural venous sinuses.    NECK CTA: Three vessel arch.  Carotid arteries are patent with mild atherosclerotic change.  No hemodynamically significant stenosis by NASCET criteria in either carotid system.  Mild vertebral artery atherosclerosis. No significant narrowing or   dissection.      Impression    CONCLUSION:  HEAD CT:  1.  Chronic appearing right parietal infarct. No definite acute infarct.  2.  No hemorrhage.    HEAD CTA:  1.  Negative.  2.  No vessel stenosis, occlusion or aneurysm.    NECK CTA:  1.  Mild carotid and vertebral artery atherosclerosis.  2.  No dissection or hemodynamically significant narrowing in the neck by NASCET criteria.     CTA Head Neck with Contrast    Narrative    HEAD AND NECK CT ANGIOGRAM WITH IV CONTRAST  [facility]  4/29/2025 6:29 PM CDT    INDICATION: Increased confusion for the last several weeks, new diagnosis A fib, evaluate for evidence of stroke  TECHNIQUE: Head and neck CT angiogram with IV contrast. Noncontrast head CT followed by axial helical CT images of the head and neck vessels obtained during the arterial phase of intravenous contrast administration. Axial helical 2D reconstructed images   and multiplanar 3D MIP reconstructed images of the head and neck vessels were performed by the technologist. Dose reduction techniques were used. Vessel stenoses reported according to NASCET criteria.  CONTRAST: 69 mL Isovue 370  COMPARISON: None.    FINDINGS:  NONCONTRAST HEAD CT: Chronic appearing right parietal infarct. No definite acute infarct. No hemorrhage. The ventricles and sulci are normal for age. Osseous structures are intact.  Mild inflammatory mucosal thickening in the sinuses. The mastoid air   cells are free of significant disease. The orbits are unremarkable.    HEAD CTA: The intracranial circulation is patent without evidence for aneurysm, proximal vessel occlusion, high-grade intracranial stenosis or arteriovenous malformation.  Patent dural venous sinuses.    NECK CTA: Three vessel arch.  Carotid arteries are patent with mild atherosclerotic change.  No hemodynamically significant stenosis by NASCET criteria in either carotid system.  Mild vertebral artery atherosclerosis. No significant narrowing or   dissection.      Impression    CONCLUSION:  HEAD CT:  1.  Chronic appearing right parietal infarct. No definite acute infarct.  2.  No hemorrhage.    HEAD CTA:  1.  Negative.  2.  No vessel stenosis, occlusion or aneurysm.    NECK CTA:  1.  Mild carotid and vertebral artery atherosclerosis.  2.  No dissection or hemodynamically significant narrowing in the neck by NASCET criteria.     XR Chest Port 1 View    Narrative    EXAM: XR CHEST PORT 1 VIEW  LOCATION: [facility]  DATE: 2025    INDICATION: SVT  COMPARISON: None.      Impression    IMPRESSION:  1.  Clear lungs. No beth pulmonary edema.  2.  No pleural effusion or pneumothorax.  3.  Mild cardiomegaly.  4.  Widened vascular pedicle and pulmonary venous congestion suggesting fluid overload.      Echocardiogram Complete   Result Value    LVEF  40-45%    Narrative    [id number]  [id number]  [id number]  [id number]^ASHLEY^LEA^ARTEMIO     [facility]  Echocardiography Laboratory  [address] [place] [address]     Name: JOSEFINA LEDEZMA  MRN: [id number]  : 1965  Study Date: 2025 10:55 AM  Age: 59 yrs  Gender: Male  Patient Location: [facility]  Reason For Study: Aflutter  Ordering Physician: LEA RAMIREZ  Referring Physician: Pricila Coburn MD  Performed By: Stephanie Friedman     BSA: 2.7 m2  Height: 70 in  Weight: 366  lb  HR: 89  BP: 120/86 mmHg  ______________________________________________________________________________  Procedure  Echocardiogram with two-dimensional, color and spectral Doppler. Definity (NDC  #97630-793) given intravenously.  ______________________________________________________________________________  Interpretation Summary     Left ventricular systolic function is mild to moderately reduced.  The visual ejection fraction is 40-45%.  Apical hypokinesis, including the distal anterior, anterolateral, and  inferolateral walls.  Aortic root dilatation is present, 4.3 cm  Ascending aorta dilatation is present, 4.0 cm  No significant valve disease.  The study was technically difficult. Compared to the previous echo from 2017,  the LV function has decreased, WMA are new, and the aorta is unchanged.  ______________________________________________________________________________  Left Ventricle  The left ventricle is normal in size. There is normal left ventricular wall  thickness. Diastolic Doppler findings (E/E' ratio and/or other parameters)  suggest left ventricular filling pressures are indeterminate. Left ventricular  systolic function is mild to moderately reduced. The visual ejection fraction  is 40-45%. Apical hypokinesis, including the distal anterior, anterolateral,  and inferolateral walls.     Right Ventricle  The right ventricle is normal in structure, function and size.     Atria  Normal left atrial size. Right atrial size is normal. There is no atrial shunt  seen.     Mitral Valve  The mitral valve is normal in structure and function. There is trace mitral  regurgitation.     Tricuspid Valve  The tricuspid valve is normal in structure and function. There is trace  tricuspid regurgitation. Right ventricular systolic pressure could not be  approximated due to inadequate tricuspid regurgitation. IVC diameter and  respiratory changes fall into an intermediate range suggesting an RA pressure  of 8  mmHg.     Aortic Valve  The aortic valve is normal in structure and function. No aortic regurgitation  is present. No aortic stenosis is present.     Pulmonic Valve  The pulmonic valve is not well seen, but is grossly normal. There is trace  pulmonic valvular regurgitation.     Vessels  Aortic root dilatation is present. 4.3 cm. Ascending aorta dilatation is  present. 4.0 cm.     Pericardium  There is no pericardial effusion.     ______________________________________________________________________________  MMode/2D Measurements & Calculations  IVSd: 1.2 cm  LVIDd: 5.4 cm  LVIDs: 4.1 cm  LVPWd: 1.1 cm  FS: 24.1 %  LV mass(C)d: 249.4 grams  LV mass(C)dI: 92.4 grams/m2     Ao root diam: 4.3 cm  asc Aorta Diam: 4.0 cm  Ao root diam index Ht(cm/m): 2.4  Ao root diam index BSA (cm/m2): 1.6  Asc Ao diam index BSA (cm/m2): 1.5  Asc Ao diam index Ht(cm/m): 2.2  EF Biplane: 45.0 %  RWT: 0.41  TAPSE: 2.6 cm     Doppler Measurements & Calculations  MV E max derik: 104.5 cm/sec  MV dec slope: 562.7 cm/sec2  MV dec time: 0.19 sec  PA acc time: 0.12 sec  E/E' avg: 10.8  Lateral E/e': 10.1     Medial E/e': 11.4  RV S Derik: 7.7 cm/sec     ______________________________________________________________________________  Report approved by: Nahum Nichols MD on 04/30/2025 12:31 PM

## 2025-05-01 ENCOUNTER — APPOINTMENT (OUTPATIENT)
Dept: NUCLEAR MEDICINE | Facility: CLINIC | Age: 60
DRG: 309 | End: 2025-05-01
Attending: INTERNAL MEDICINE
Payer: COMMERCIAL

## 2025-05-01 ENCOUNTER — APPOINTMENT (OUTPATIENT)
Dept: CT IMAGING | Facility: CLINIC | Age: 60
DRG: 309 | End: 2025-05-01
Attending: STUDENT IN AN ORGANIZED HEALTH CARE EDUCATION/TRAINING PROGRAM
Payer: COMMERCIAL

## 2025-05-01 ENCOUNTER — APPOINTMENT (OUTPATIENT)
Dept: CARDIOLOGY | Facility: CLINIC | Age: 60
DRG: 309 | End: 2025-05-01
Attending: INTERNAL MEDICINE
Payer: COMMERCIAL

## 2025-05-01 VITALS
BODY MASS INDEX: 42.66 KG/M2 | RESPIRATION RATE: 15 BRPM | WEIGHT: 315 LBS | SYSTOLIC BLOOD PRESSURE: 143 MMHG | HEIGHT: 72 IN | TEMPERATURE: 98.5 F | OXYGEN SATURATION: 100 % | DIASTOLIC BLOOD PRESSURE: 95 MMHG | HEART RATE: 87 BPM

## 2025-05-01 LAB
ANION GAP SERPL CALCULATED.3IONS-SCNC: 9 MMOL/L (ref 7–15)
BUN SERPL-MCNC: 17.7 MG/DL (ref 8–23)
CALCIUM SERPL-MCNC: 9.3 MG/DL (ref 8.8–10.4)
CHLORIDE SERPL-SCNC: 102 MMOL/L (ref 98–107)
CREAT SERPL-MCNC: 1.53 MG/DL (ref 0.67–1.17)
CV STRESS MAX HR HE: 86
EGFRCR SERPLBLD CKD-EPI 2021: 52 ML/MIN/1.73M2
ERYTHROCYTE [DISTWIDTH] IN BLOOD BY AUTOMATED COUNT: 13.9 % (ref 10–15)
GLUCOSE SERPL-MCNC: 104 MG/DL (ref 70–99)
HCO3 SERPL-SCNC: 29 MMOL/L (ref 22–29)
HCT VFR BLD AUTO: 43 % (ref 40–53)
HGB BLD-MCNC: 13.9 G/DL (ref 13.3–17.7)
MCH RBC QN AUTO: 30.3 PG (ref 26.5–33)
MCHC RBC AUTO-ENTMCNC: 32.3 G/DL (ref 31.5–36.5)
MCV RBC AUTO: 94 FL (ref 78–100)
PLATELET # BLD AUTO: 205 10E3/UL (ref 150–450)
POTASSIUM SERPL-SCNC: 4.3 MMOL/L (ref 3.4–5.3)
RATE PRESSURE PRODUCT: NORMAL
RBC # BLD AUTO: 4.58 10E6/UL (ref 4.4–5.9)
SODIUM SERPL-SCNC: 140 MMOL/L (ref 135–145)
STRESS ECHO BASELINE DIASTOLIC HE: 85
STRESS ECHO BASELINE HR: 85 BPM
STRESS ECHO BASELINE SYSTOLIC BP: 121
STRESS ECHO CALCULATED PERCENT HR: 54 %
STRESS ECHO LAST STRESS DIASTOLIC BP: 88
STRESS ECHO LAST STRESS SYSTOLIC BP: 122
STRESS ECHO TARGET HR: 160
WBC # BLD AUTO: 8.5 10E3/UL (ref 4–11)

## 2025-05-01 PROCEDURE — 80051 ELECTROLYTE PANEL: CPT | Performed by: STUDENT IN AN ORGANIZED HEALTH CARE EDUCATION/TRAINING PROGRAM

## 2025-05-01 PROCEDURE — 70450 CT HEAD/BRAIN W/O DYE: CPT

## 2025-05-01 PROCEDURE — A9500 TC99M SESTAMIBI: HCPCS | Performed by: INTERNAL MEDICINE

## 2025-05-01 PROCEDURE — 36415 COLL VENOUS BLD VENIPUNCTURE: CPT | Performed by: STUDENT IN AN ORGANIZED HEALTH CARE EDUCATION/TRAINING PROGRAM

## 2025-05-01 PROCEDURE — 250N000011 HC RX IP 250 OP 636: Performed by: NURSE PRACTITIONER

## 2025-05-01 PROCEDURE — 343N000001 HC RX 343 MED OP 636: Performed by: INTERNAL MEDICINE

## 2025-05-01 PROCEDURE — 99232 SBSQ HOSP IP/OBS MODERATE 35: CPT | Performed by: STUDENT IN AN ORGANIZED HEALTH CARE EDUCATION/TRAINING PROGRAM

## 2025-05-01 PROCEDURE — 999N000049 HC STATISTIC ECHO STRESS OR NM NPI

## 2025-05-01 PROCEDURE — 250N000011 HC RX IP 250 OP 636: Mod: JZ | Performed by: INTERNAL MEDICINE

## 2025-05-01 PROCEDURE — 250N000013 HC RX MED GY IP 250 OP 250 PS 637: Performed by: STUDENT IN AN ORGANIZED HEALTH CARE EDUCATION/TRAINING PROGRAM

## 2025-05-01 PROCEDURE — 85027 COMPLETE CBC AUTOMATED: CPT | Performed by: STUDENT IN AN ORGANIZED HEALTH CARE EDUCATION/TRAINING PROGRAM

## 2025-05-01 PROCEDURE — 210N000002 HC R&B HEART CARE

## 2025-05-01 PROCEDURE — 93016 CV STRESS TEST SUPVJ ONLY: CPT | Performed by: INTERNAL MEDICINE

## 2025-05-01 PROCEDURE — 99232 SBSQ HOSP IP/OBS MODERATE 35: CPT | Performed by: PHYSICIAN ASSISTANT

## 2025-05-01 PROCEDURE — 99233 SBSQ HOSP IP/OBS HIGH 50: CPT | Mod: FS | Performed by: NURSE PRACTITIONER

## 2025-05-01 RX ORDER — LIDOCAINE 40 MG/G
CREAM TOPICAL
Status: DISCONTINUED | OUTPATIENT
Start: 2025-05-01 | End: 2025-05-01

## 2025-05-01 RX ORDER — ALBUTEROL SULFATE 90 UG/1
2 INHALANT RESPIRATORY (INHALATION) EVERY 5 MIN PRN
Status: DISCONTINUED | OUTPATIENT
Start: 2025-05-01 | End: 2025-05-01

## 2025-05-01 RX ORDER — DIGOXIN 0.25 MG/ML
250 INJECTION INTRAMUSCULAR; INTRAVENOUS EVERY 6 HOURS
Status: COMPLETED | OUTPATIENT
Start: 2025-05-01 | End: 2025-05-02

## 2025-05-01 RX ORDER — REGADENOSON 0.08 MG/ML
0.4 INJECTION, SOLUTION INTRAVENOUS ONCE
Status: COMPLETED | OUTPATIENT
Start: 2025-05-01 | End: 2025-05-01

## 2025-05-01 RX ORDER — AMINOPHYLLINE 25 MG/ML
50-100 INJECTION, SOLUTION INTRAVENOUS
Status: DISCONTINUED | OUTPATIENT
Start: 2025-05-01 | End: 2025-05-01

## 2025-05-01 RX ORDER — CAFFEINE 200 MG
200 TABLET ORAL
Status: DISCONTINUED | OUTPATIENT
Start: 2025-05-01 | End: 2025-05-01

## 2025-05-01 RX ORDER — DIGOXIN 0.25 MG/ML
500 INJECTION INTRAMUSCULAR; INTRAVENOUS ONCE
Status: COMPLETED | OUTPATIENT
Start: 2025-05-01 | End: 2025-05-01

## 2025-05-01 RX ORDER — SODIUM CHLORIDE 9 MG/ML
INJECTION, SOLUTION INTRAVENOUS CONTINUOUS PRN
Status: ACTIVE | OUTPATIENT
Start: 2025-05-01 | End: 2025-05-01

## 2025-05-01 RX ORDER — CAFFEINE CITRATE 20 MG/ML
60 SOLUTION INTRAVENOUS
Status: DISCONTINUED | OUTPATIENT
Start: 2025-05-01 | End: 2025-05-01

## 2025-05-01 RX ADMIN — CARVEDILOL 50 MG: 25 TABLET, FILM COATED ORAL at 18:21

## 2025-05-01 RX ADMIN — CARVEDILOL 50 MG: 25 TABLET, FILM COATED ORAL at 08:03

## 2025-05-01 RX ADMIN — DIGOXIN 500 MCG: 0.25 INJECTION INTRAMUSCULAR; INTRAVENOUS at 13:40

## 2025-05-01 RX ADMIN — LEVOTHYROXINE SODIUM 75 MCG: 75 TABLET ORAL at 08:03

## 2025-05-01 RX ADMIN — REGADENOSON 0.4 MG: 0.08 INJECTION, SOLUTION INTRAVENOUS at 09:38

## 2025-05-01 RX ADMIN — ASPIRIN 81 MG CHEWABLE TABLET 81 MG: 81 TABLET CHEWABLE at 08:03

## 2025-05-01 RX ADMIN — DIGOXIN 250 MCG: 0.25 INJECTION INTRAMUSCULAR; INTRAVENOUS at 20:44

## 2025-05-01 RX ADMIN — Medication 26.1 MILLICURIE: at 10:13

## 2025-05-01 RX ADMIN — ALLOPURINOL 300 MG: 300 TABLET ORAL at 08:03

## 2025-05-01 RX ADMIN — APIXABAN 5 MG: 5 TABLET, FILM COATED ORAL at 20:41

## 2025-05-01 ASSESSMENT — ACTIVITIES OF DAILY LIVING (ADL)
ADLS_ACUITY_SCORE: 41

## 2025-05-01 NOTE — PROGRESS NOTES
Lexiscan Stress: Pt tolerated well. VSS. Pt denied chest pain or pressure prior to injection. After injection a quick instance of chest pain and SOB that recovered within seconds. No other issues or episodes of CP or SOB after initial episode    0948 Pt transferred back to Rm 267 per w/c. Detailed report given to RN.

## 2025-05-01 NOTE — PLAN OF CARE
Goal Outcome Evaluation:      Plan of Care Reviewed With: patient      Pt a/o x4, neuro wnl but pt very forgetful. Bed alarm for safety, steady with ambulation but HR up to 170 with activity. Denies symptoms. 's/100. Start coreg, dose increased by cards. Pt appears to still be in Afib, EKG seen by cards. HR mostly in 80-90 but -170 with activity as noted. MRI of brain ordered, called MRI, they will call when ready to premed due to claustrophobia. NPO after MN for possible angiogram tomorrow.

## 2025-05-01 NOTE — PROGRESS NOTES
[facility]    Medicine Progress Note - Hospitalist Service    Date of Admission:  4/29/2025    Assessment & Plan   Michael Esteban is a 59 year old male admitted on 4/29/2025 from clinic for palpitation and fainting. He was found to be in Afib with RVR while in clinic and sent to ER via EMS.     New onset Aflutter with RVR  Syncope  Presented to clinic for intermittent symptoms of palpitations, chest tightness and dizziness the last few days. He did have episode of syncope just before his clinic visit. HR found to be in to the 180s SVT vs Afib. He was given adenosine x2 which slowed his rate but he did not convert. Presented to the ER via EMS where HR remained elevated he was started on dilt gtt and convert to NSR. Troponin returned at 14. TSH normal     - TTE returned with mild WMA throughout LV, cardiology consulted and stress test performed today and into tomorrow  - will need to start on anticoagulation however patient's insurance will be changing tomorrow and we don't have idea on coverage of medications, currently holding for formal stroke work up  - looks to be still in Aflutter with controlled rates except with activity, these episodes are symptomatic. Appreciate cardiac rehab eval today. Appreciate cardiology assistance in rate control. Cardioversion?    New onset confusion with memory lapses  R parietal CVA  While in the ER he off handily reported he was fired form his [employer] job due to having new on set confusion, memory issues and inability to perform previously normal tasks. Wife confirms new confusion    - CT positive for chronic R parietal stroke,  will have follow up head CT today per neurology  - TTE without thrombus  - LDL noted to be quite low so plan to stop statin for now  - will need anticoagulation for afib     AMAYA on CKD  Live kidney donor s/p nephrectomy  Cr baseline ~1.5  -  resolved    Essential HTN  - resume coreg at increased dose  - resume home  lisinopril     Hypothyroidism  - resume PTA levothyroxine     AAA with repair in 2015  - noted continue PTA statin and asa     Gout  - continue PTA allopurinol     BERNADETTE  - home cpap settings       Observation Goals: -diagnostic tests and consults completed and resulted, -vital signs normal or at patient baseline, -safe disposition plan has been identified, Nurse to notify provider when observation goals have been met and patient is ready for discharge.  Diet: NPO for Medical/Clinical Reasons Except for: Meds    DVT Prophylaxis: Pneumatic Compression Devices  Valdez Catheter: Not present  Lines: None     Cardiac Monitoring: ACTIVE order. Indication: AMI (NSTEMI/ STEMI) (48 hours)  Code Status: Full Code      Clinically Significant Risk Factors Present on Admission                 # Drug Induced Platelet Defect: home medication list includes an antiplatelet medication   # Hypertension: Noted on problem list           # Morbid Obesity: Estimated body mass index is 45.18 kg/m  as calculated from the following:    Height as of this encounter: 1.829 m (6').    Weight as of this encounter: 151.1 kg (333 lb 1.6 oz).              Social Drivers of Health    Depression: At risk (3/17/2025)    Received from [facility]    PHQ-2     PHQ-2 Score: 6   Housing Stability: High Risk (4/30/2025)    Housing Stability     Do you have housing? : No     Are you worried about losing your housing?: No   Tobacco Use: High Risk (4/29/2025)    Received from [facility] [facility]    Patient History     Smoking Tobacco Use: Some Days     Smokeless Tobacco Use: Never          Disposition Plan     Medically Ready for Discharge: Anticipated Tomorrow             Jacqueline Crump DO  Hospitalist Service  [facility]  Securely message with Typeform (more info)  Text page via [employer] Paging/Directory   ______________________________________________________________________    Interval History   Patient went down for the first part of his  stress test. He reported no symptoms of palpitation but he did feel some dizziness with activity and walking. He was aware of the work up ongoing on and denied major questions. Wife bedside and updated on the plan. He was tired. No pain. No leg swelling    Physical Exam   Vital Signs: Temp: 99  F (37.2  C) Temp src: Oral BP: 121/85 Pulse: 86   Resp: 18 SpO2: 94 % O2 Device: None (Room air)    Weight: 333 lbs 1.6 oz    Constitutional: Awake, alert, cooperative, no apparent distress  Skin/Integumen: No rashes, no cyanosis  Other:  No edema in LE    Medical Decision Making       48 MINUTES SPENT BY ME on the date of service doing chart review, history, exam, documentation & further activities per the note.      Data     I have personally reviewed the following data over the past 24 hrs:    8.5  \   13.9   / 205     140 102 17.7 /  104 (H)   4.3 29 1.53 (H) \       Imaging results reviewed over the past 24 hrs:   Recent Results (from the past 24 hours)   Echocardiogram Complete   Result Value    LVEF  40-45%    Narrative    [id number]  [id number]  [id number]  [id number]^ASHLEY^LEA^ARTEMIO     [facility]  Echocardiography Laboratory  [address] [place] [address]     Name: JOSEFINA LEDEZMA  MRN: [id number]  : 1965  Study Date: 2025 10:55 AM  Age: 59 yrs  Gender: Male  Patient Location: [facility]  Reason For Study: Aflutter  Ordering Physician: LEA RAMIREZ  Referring Physician: Pricila Coburn MD  Performed By: Stephanie Friedman     BSA: 2.7 m2  Height: 70 in  Weight: 366 lb  HR: 89  BP: 120/86 mmHg  ______________________________________________________________________________  Procedure  Echocardiogram with two-dimensional, color and spectral Doppler. Definity (NDC  #[id number]) given intravenously.  ______________________________________________________________________________  Interpretation Summary     Left ventricular systolic function is mild to moderately reduced.  The visual  ejection fraction is 40-45%.  Apical hypokinesis, including the distal anterior, anterolateral, and  inferolateral walls.  Aortic root dilatation is present, 4.3 cm  Ascending aorta dilatation is present, 4.0 cm  No significant valve disease.  The study was technically difficult. Compared to the previous echo from 2017,  the LV function has decreased, WMA are new, and the aorta is unchanged.  ______________________________________________________________________________  Left Ventricle  The left ventricle is normal in size. There is normal left ventricular wall  thickness. Diastolic Doppler findings (E/E' ratio and/or other parameters)  suggest left ventricular filling pressures are indeterminate. Left ventricular  systolic function is mild to moderately reduced. The visual ejection fraction  is 40-45%. Apical hypokinesis, including the distal anterior, anterolateral,  and inferolateral walls.     Right Ventricle  The right ventricle is normal in structure, function and size.     Atria  Normal left atrial size. Right atrial size is normal. There is no atrial shunt  seen.     Mitral Valve  The mitral valve is normal in structure and function. There is trace mitral  regurgitation.     Tricuspid Valve  The tricuspid valve is normal in structure and function. There is trace  tricuspid regurgitation. Right ventricular systolic pressure could not be  approximated due to inadequate tricuspid regurgitation. IVC diameter and  respiratory changes fall into an intermediate range suggesting an RA pressure  of 8 mmHg.     Aortic Valve  The aortic valve is normal in structure and function. No aortic regurgitation  is present. No aortic stenosis is present.     Pulmonic Valve  The pulmonic valve is not well seen, but is grossly normal. There is trace  pulmonic valvular regurgitation.     Vessels  Aortic root dilatation is present. 4.3 cm. Ascending aorta dilatation is  present. 4.0 cm.     Pericardium  There is no pericardial  effusion.     ______________________________________________________________________________  MMode/2D Measurements & Calculations  IVSd: 1.2 cm  LVIDd: 5.4 cm  LVIDs: 4.1 cm  LVPWd: 1.1 cm  FS: 24.1 %  LV mass(C)d: 249.4 grams  LV mass(C)dI: 92.4 grams/m2     Ao root diam: 4.3 cm  asc Aorta Diam: 4.0 cm  Ao root diam index Ht(cm/m): 2.4  Ao root diam index BSA (cm/m2): 1.6  Asc Ao diam index BSA (cm/m2): 1.5  Asc Ao diam index Ht(cm/m): 2.2  EF Biplane: 45.0 %  RWT: 0.41  TAPSE: 2.6 cm     Doppler Measurements & Calculations  MV E max derik: 104.5 cm/sec  MV dec slope: 562.7 cm/sec2  MV dec time: 0.19 sec  PA acc time: 0.12 sec  E/E' avg: 10.8  Lateral E/e': 10.1     Medial E/e': 11.4  RV S Derik: 7.7 cm/sec     ______________________________________________________________________________  Report approved by: Nahum Nichols MD on 04/30/2025 12:31 PM

## 2025-05-01 NOTE — PROGRESS NOTES
[facility] Progress Note  Date of Service: 05/01/2025    Primary Cardiologist: Previously Dr. Tipton - has not been seen in many years - hospital consult Dr. Radha ROB Carlito is a 59 year old male with past medical history of ascending aortic aneurysm s/p repair with Gelweave 30 mm interposition graft and PFO closure in 2015 (normal aortic valve), mild prox RCA plaque at time per aortic repair, solitary kidney as he was kidney donor in 1980's, gout, hypothyroidism, HTN, HLD, BERNADETTE on CPAP, tobacco abuse who presented 4/29/25 for several days of palpitations and dizziness with a brief near syncopal episode without LOC. He was seen in urgent care initially and and sent to the ER via EMS. Found to be in rapid atrial flutter. Per notes EMS administered 6 mg adenosine and then 12 mg adenosine x 2 with flutter waves on EKG.  They stated that the patient had a heart rate between 176-180 during transit with variation between 150-180 after being given 400 ML fluids, further noting a manual blood pressure of 110/62. On arrival in AFL RVR was given 10mg diltiazem and infusion and rates slowed but still appears AFL. AMAYA with creatinine 2.1, HS troponin 13. Head CT showed  chronic appearing right parietal infarct, no definite acute infarct or hemorrhage, CTA neck with mild carotid and vertebral artery atherosclerosis. Brain MRI pending. Neurology was consulted. Echocardiogram with LVEF 40-45% with apical hypokinesis including the distal anterior, anterolateral, and  inferolateral walls. Cardiology consulted.    Interim History: Was unable to tolerate brain MRI due to claustrophobia last night    Subjective: Just back from stress test. Remains forgetful. Wife at bedside.   He lost his job as a  about 5 months ago due to confusion with tasks.     Telemetry: AFL 80's     Assessment:  Atrial flutter / Afib - rapid initially, slowed with dilitaizem in ER. On coreg dose increased per primary  team 50mg twice daily, rates 80-90 currently  New cardiomyopathy, LVEF 40-45 with with apical hypokinesis including the distal anterior, anterolateral, and inferolateral wall. Etiology unclear but possibly tachy-mediated. Nuc stress testing today to risk stratify.   Ischemic stroke on CT, suggestive of chronic, awaiting MRI to help determine etiology and chronicity and confirmation of no acute concerns before starting anticoagulation. Continue aspirin   Dizziness, memory issues  HTN - controlled on increased coreg dose currently. Lisinopril on hold with AMAYA  History of ascending aortic aneurysm s/p repair with Gelweave 30 mm interposition graft and PFO closure in 2015  AMAYA - creatinine down trending from peak 2 to 1.5  solitary kidney as he was kidney donor in 1980's  Tobacco abuse     Plan:   Continue coreg at present dose today with current plans of rate control awaiting neurology guidance   RVR rates with activity. Will add digoxin for now  If renal function down trending further tomorrow can reduce Coreg to 37.5mg twice a day and resume lisinopril   Continue atorvastatin - LDL goals per neurology   Awaiting stress testing results - 2 day study   Awaiting brain MRI (sounds like will have repeat head CT today) and neurology input on starting anticoagulation, continue aspirin for now  Anticipate EP referral outpatient for rhythm control planning   No driving per Neurology until outpatient follow up. PT/OT/     Plan discussed with Dr. Garcia.    I spent 25 minutes on the date of the encounter which included face to face time, review of cardiac testing, pertinent imaging, lab work and coordination of care.       NICK Miller [facility]  Page via [employer]   __________________________________________________________________________  Physical Exam   Temp: 99  F (37.2  C) Temp src: Oral BP: 121/85 Pulse: 86   Resp: 18 SpO2: 94 % O2 Device: None (Room air)    Vitals:    05/01/25 0205    Weight: (!) 151.1 kg (333 lb 1.6 oz)       GENERAL:  The patient is in no apparent distress. Forgetful.  PULMONARY:  There is a normal respiratory effort. Clear lungs to auscultation bilaterally.   CARDIOVASCULAR:  RRR, normal S1 S2, no m/r/g.  GI:  Large, obese   EXTREMITIES: Warm/dry.  VASCULAR: 2+ Pulses bilaterally in upper and lower extremities.    Medications   Current Facility-Administered Medications   Medication Dose Route Frequency Provider Last Rate Last Admin     Current Facility-Administered Medications   Medication Dose Route Frequency Provider Last Rate Last Admin    allopurinol (ZYLOPRIM) tablet 300 mg  300 mg Oral Daily Jacqueline Crump, DO   300 mg at 05/01/25 0803    aspirin (ASA) chewable tablet 81 mg  81 mg Oral Daily Jacqueline Crump, DO   81 mg at 05/01/25 0803    carvedilol (COREG) tablet 50 mg  50 mg Oral BID w/meals Jacqueline Crump E, DO   50 mg at 05/01/25 0803    levothyroxine (SYNTHROID/LEVOTHROID) tablet 75 mcg  75 mcg Oral Daily Jacqueline Crump, DO   75 mcg at 05/01/25 0803    [Held by provider] lisinopril (ZESTRIL) tablet 20 mg  20 mg Oral Daily Jacqueline Crump, DO   20 mg at 04/30/25 1548    sodium chloride (PF) 0.9% PF flush 3 mL  3 mL Intravenous Q8H Angelica Cooney MD           Data   Most Recent 3 CBC's:  Recent Labs   Lab Test 05/01/25  0553 04/30/25  0827 04/29/25  1723   WBC 8.5 8.2 9.3   HGB 13.9 13.7 13.7   MCV 94 95 93    187 209     Most Recent 3 BMP's:  Recent Labs   Lab Test 05/01/25  0553 04/30/25  0827 04/29/25  1723    139 140   POTASSIUM 4.3 4.9 5.0   CHLORIDE 102 103 103   CO2 29 27 27   BUN 17.7 21.0 26.5*   CR 1.53* 1.71* 2.13*   ANIONGAP 9 9 10   CATY 9.3 9.0 9.2   * 116* 110*     Most Recent 2 LFT's:  Recent Labs   Lab Test 04/29/25  1723 04/01/20  0000 01/08/20  1148   AST 25 19 17   ALT 48 33 39   ALKPHOS 76  --  63   BILITOTAL 0.5  --  0.5     Most Recent Cholesterol Panel:  Recent Labs   Lab Test 04/29/25 1942   CHOL 77    LDL 22   HDL 27*   TRIG 140     Most Recent TSH and T4:  Recent Labs   Lab Test 04/29/25  1723 01/08/20  1148   TSH 2.14 2.40   T4  --  0.94       Echo: 4/30/25  Summary  Left ventricular systolic function is mild to moderately reduced.  The visual ejection fraction is 40-45%.  Apical hypokinesis, including the distal anterior, anterolateral, and  inferolateral walls.  Aortic root dilatation is present, 4.3 cm  Ascending aorta dilatation is present, 4.0 cm  No significant valve disease.  The study was technically difficult. Compared to the previous echo from 2017,  the LV function has decreased, WMA are new, and the aorta is unchanged.

## 2025-05-01 NOTE — PROGRESS NOTES
ADDENDUM: unable to get MRI so instead head CT was repeated. This is stable and continues to show chronic R parietal infarct.    OK to initiate oral AC at any time from a stroke perspective. From a stroke standpoint would not require antiplatelet therapy in addition to therapeutic anticoagulation. Please see below for further recommendations. Follow up with stroke JEANE or general neurology in 6-8 weeks recommended (ordered).    Stroke team will sign off. Please call with questions.    Megan Mayfield PA-C  Vascular Neurology        [facility]    Vascular Neurology Progress Note    Interval History     Patient unable to tolerate MRI overnight but states he wants to try again today. Neuro stable.    Hospital Course     Chief complaint: Tachycardia    HPI  Michael Esteban is a 59 year old male with PMH of AAA without rupture s/p repair (2015), live kidney donor s/p nephrectomy, gout, hypothyroidism, HTN, HLD, BERNADETTE on CPAP, tobacco abuse who presented 4/29/25 for several days of palpitations and dizziness with syncopal episode x1. He was found to be in Afib with RVR. On PTA ASA 81mg. During the past week also mentioned having memory issues/confusion figuring out how to perform tasks at work, which resulted in him losing his job ([employer]). This history prompted head CT which showed chronic R parietal infarct and stroke consult. Denies headache, slurred speech, extremity weakness or numbness, nausea/vomiting. He has not had similar symptoms before. No history of stroke.    Assessment and Plan     Chronic ischemic stroke of right parietal lobe due to undetermined etiology  New diagnosis of atrial fibrillation/flutter. CHADS VASCS is 3 indicating that anticoagulation is indicated for secondary stroke prevention.    Recommendations   - Will attempt brain MRI once more, if unable to tolerate then discontinue and instead will obtain repeat head CT   - Antithrombolytics: on PTA aspirin 81mg for now,  "pending repeat head imaging (either MRI or CT) anticipate initiation of oral anticoagulation for secondary stroke prevention. Once anticoagulated aspirin could be stopped from a stroke perspective.  - Long term outpatient BP goal <130/80 with tighter control associated with decreased overall CV risk, if tolerated. No clear indication for permissive HTN at this time.  - Statin: PTA atorvastatin 40mg, consider de-escalating statin dose given LDL 22 though may not be appropriate in the acute setting  - PT/OT/SLP. OT cognitive eval. Given cognitive concerns should not drive until formally evaluated in the outpatient setting.  - Stroke Education  - Euthermia, Euglycemia  - Bedside glucose monitoring  - Hgb A1c goal <7%      Patient Follow-up    - in 6-8 weeks with general neurology or stroke JEANE ([phone]) (ordered)    Will follow for MRI/CT results for final recommendations.    Attempted to call and  update his wife at his request - no answer and LVM to call back for update if desired.    Megan Mayfield PA-C  Vascular Neurology    To page me or covering stroke neurology team member, click here: [facility]  Choose \"On Call\" tab at top, then select \"NEUROLOGY/ALL SITES\" from middle drop-down box, press Enter, then look for \"stroke\" or \"telestroke\" for your site.    Physical Examination     Temp: 99  F (37.2  C) Temp src: Oral BP: (!) 156/114 Pulse: 85   Resp: 18 SpO2: 96 % O2 Device: None (Room air)      General Exam  General:  patient sitting in the chair without any acute distress    HEENT:  normocephalic/atraumatic  Pulmonary:  no respiratory distress     Neuro Exam  Mental Status:  alert, oriented x 3, follows commands, speech clear and fluent, vague historian  Cranial Nerves:  visual fields intact, PERRL, EOMI with normal smooth pursuit, facial sensation intact and symmetric, facial movements symmetric, hearing not formally tested but intact to conversation  Motor:  normal muscle tone and bulk, no abnormal movements, " able to move all limbs spontaneously, strength 5/5 throughout upper and lower extremities, no pronator drift  Reflexes:   deferred  Sensory:  light touch sensation intact and symmetric throughout upper and lower extremities  Coordination:  normal finger-to-nose without dysmetria  Station/Gait:  deferred    Stroke Scales       NIHSS  1a. Level of Consciousness 0-->Alert, keenly responsive   1b. LOC Questions 0-->Answers both questions correctly   1c. LOC Commands 0-->Performs both tasks correctly   2.   Best Gaze 0-->Normal   3.   Visual 0-->No visual loss   4.   Facial Palsy 0-->Normal symmetrical movements   5a. Motor Arm, Left 0-->No drift, limb holds 90 (or 45) degrees for full 10 secs   5b. Motor Arm, Right 0-->No drift, limb holds 90 (or 45) degrees for full 10 secs   6a. Motor Leg, Left 0-->No drift, leg holds 30 degree position for full 5 secs   6b. Motor Leg, right 0-->No drift, leg holds 30 degree position for full 5 secs   7.   Limb Ataxia 0-->Absent   8.   Sensory 0-->Normal, no sensory loss   9.   Best Language 0-->No aphasia, normal   10. Dysarthria 0-->Normal   11. Extinction and Inattention  0-->No abnormality   Total 0 (05/01/25 1105)       Imaging/Labs   (Bolded imaging and labs new and/or personally reviewed or re-reviewed by me today CT)    MRI/Head CT CT: chronic appearing R parietal infarct   Intracranial Vasculature CTA: no significant stenosis   Cervical Vasculature CTA: no significant stenosis     Echocardiogram 4/30/2025: Decreased LVEF to 40-45%, new apical hypokinesis, stable aortic root and ascending aorta dilation (compared to 2017)    EKG/Telemetry Atrial flutter     LDL  4/29/2025: 22 mg/dL   A1C  4/29/2025: 6.1 %   Troponin 4/29/2025: 11 ng/L     Other labs reviewed by me today:  CBC, BMP    Time Spent on this Encounter   Billing: I have personally spent a total of 40 minutes providing care today, time spent in reviewing medical records and devising the plan as recorded above.

## 2025-05-01 NOTE — PLAN OF CARE
Goal Outcome Evaluation:      Plan of Care Reviewed With: patient    Overall Patient Progress: no changeOverall Patient Progress: no change    Neuro: A&Ox4 neuros intact very forgetful   Tele/Cardiac: Aflutter CVR HR will jump to 150-170 w/ activity pt asymptomatic   Vitals:VSS on RA  Activity: SBA  Pain: Denies pain  Drips/IV: PIV:S/L  GI/: WDL  Skin: WDL  Diet: NPO  Test/Procedures: Stress test, MRI  Plan: PT and OT eval, Neurology and cardiology following. Pt was unable to complete MRI d/t feeling panicky laying down despite the PRN given

## 2025-05-01 NOTE — PLAN OF CARE
VSS. Tele: A flutter CVR at rest, rates 150-170's with minimal activity. Cardiology started digoxin. Repeat head CT completed. Oriented but very forgetful. Pt will start Eliquis tonight. Plan to finish second part of stress test tomorrow. Needs cognitive eval. Wife at bedside and updated on plan.

## 2025-05-01 NOTE — CONSULTS
"SPIRITUAL HEALTH SERVICES - Consult Note  [facility]  Referral Source/Reason for Visit: Emotional support    Summary and Recommendations - Dacia and Juan shared that they are [employer] and attend a [employer] in [place] on occasion. They declined any spiritual/Judaism needs at this time.    Plan: Spiritual Health remains available for support. Please consult as needs arise or as requested.    Santa Daugherty M.Div.  Staff     SHS available 24/7 for emergent requests/referrals, either by paging the on-call  or by entering an ASAP/STAT consult in [employer], which will also page the on-call .    Assessment    Saw pt Michael \"Juan\" DARRON Esteban and his spouse, Dacia, per consult for emotional support.    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  He expressed concern about the family's financial situation and health insurance. Dacia offered assurance that she has switched him over to her insurance plan.    Strengths, Coping, and Resources - Dacia named that they have a good support system that includes Juan's sister and their 22 yo son.    Meaning, Beliefs, and Spirituality - Dacia and Juan shared that they are [employer] and attend a [employer] in [place] on occasion. They declined any spiritual/Judaism needs at this time.    "

## 2025-05-01 NOTE — PROGRESS NOTES
Attempted to obtain the MRI, PRN medication was given prior to MRI. Pt  was unable to lay for the MRI states that he felt like he was having a panic attack and felt like he couldn't breath. Pt turned back to his room, VSS on RA denies any trouble breathing or any pain. Pt reclined up in the chair comfortably.

## 2025-05-02 ENCOUNTER — APPOINTMENT (OUTPATIENT)
Dept: PHYSICAL THERAPY | Facility: CLINIC | Age: 60
DRG: 309 | End: 2025-05-02
Attending: STUDENT IN AN ORGANIZED HEALTH CARE EDUCATION/TRAINING PROGRAM
Payer: COMMERCIAL

## 2025-05-02 ENCOUNTER — APPOINTMENT (OUTPATIENT)
Dept: NUCLEAR MEDICINE | Facility: CLINIC | Age: 60
DRG: 309 | End: 2025-05-02
Attending: INTERNAL MEDICINE
Payer: COMMERCIAL

## 2025-05-02 VITALS
TEMPERATURE: 98.7 F | DIASTOLIC BLOOD PRESSURE: 114 MMHG | HEIGHT: 72 IN | BODY MASS INDEX: 42.66 KG/M2 | WEIGHT: 315 LBS | SYSTOLIC BLOOD PRESSURE: 142 MMHG | OXYGEN SATURATION: 96 % | RESPIRATION RATE: 18 BRPM | HEART RATE: 86 BPM

## 2025-05-02 LAB
ANION GAP SERPL CALCULATED.3IONS-SCNC: 10 MMOL/L (ref 7–15)
ATRIAL RATE - MUSE: 87 BPM
BASOPHILS # BLD AUTO: 0 10E3/UL (ref 0–0.2)
BASOPHILS NFR BLD AUTO: 0 %
BUN SERPL-MCNC: 16.6 MG/DL (ref 8–23)
CALCIUM SERPL-MCNC: 9.2 MG/DL (ref 8.8–10.4)
CHLORIDE SERPL-SCNC: 100 MMOL/L (ref 98–107)
CREAT SERPL-MCNC: 1.37 MG/DL (ref 0.67–1.17)
CV BLOOD PRESSURE: 46 MMHG
CV STRESS MAX HR HE: 86
DIASTOLIC BLOOD PRESSURE - MUSE: NORMAL MMHG
EGFRCR SERPLBLD CKD-EPI 2021: 59 ML/MIN/1.73M2
EOSINOPHIL # BLD AUTO: 0.2 10E3/UL (ref 0–0.7)
EOSINOPHIL NFR BLD AUTO: 2 %
ERYTHROCYTE [DISTWIDTH] IN BLOOD BY AUTOMATED COUNT: 14 % (ref 10–15)
GLUCOSE SERPL-MCNC: 103 MG/DL (ref 70–99)
HCO3 SERPL-SCNC: 29 MMOL/L (ref 22–29)
HCT VFR BLD AUTO: 43.4 % (ref 40–53)
HGB BLD-MCNC: 13.8 G/DL (ref 13.3–17.7)
IMM GRANULOCYTES # BLD: 0.1 10E3/UL
IMM GRANULOCYTES NFR BLD: 1 %
INTERPRETATION ECG - MUSE: NORMAL
LYMPHOCYTES # BLD AUTO: 1.6 10E3/UL (ref 0.8–5.3)
LYMPHOCYTES NFR BLD AUTO: 16 %
MCH RBC QN AUTO: 30.1 PG (ref 26.5–33)
MCHC RBC AUTO-ENTMCNC: 31.8 G/DL (ref 31.5–36.5)
MCV RBC AUTO: 95 FL (ref 78–100)
MONOCYTES # BLD AUTO: 1.2 10E3/UL (ref 0–1.3)
MONOCYTES NFR BLD AUTO: 11 %
NEUTROPHILS # BLD AUTO: 7.1 10E3/UL (ref 1.6–8.3)
NEUTROPHILS NFR BLD AUTO: 70 %
NRBC # BLD AUTO: 0 10E3/UL
NRBC BLD AUTO-RTO: 0 /100
NUC STRESS EJECTION FRACTION: 40 %
P AXIS - MUSE: -72 DEGREES
PLATELET # BLD AUTO: 192 10E3/UL (ref 150–450)
POTASSIUM SERPL-SCNC: 4.6 MMOL/L (ref 3.4–5.3)
PR INTERVAL - MUSE: 234 MS
QRS DURATION - MUSE: 92 MS
QT - MUSE: 346 MS
QTC - MUSE: 416 MS
R AXIS - MUSE: 102 DEGREES
RATE PRESSURE PRODUCT: NORMAL
RBC # BLD AUTO: 4.59 10E6/UL (ref 4.4–5.9)
SODIUM SERPL-SCNC: 139 MMOL/L (ref 135–145)
STRESS ECHO BASELINE DIASTOLIC HE: 85
STRESS ECHO BASELINE HR: 85 BPM
STRESS ECHO BASELINE SYSTOLIC BP: 121
STRESS ECHO CALCULATED PERCENT HR: 54 %
STRESS ECHO LAST STRESS DIASTOLIC BP: 88
STRESS ECHO LAST STRESS SYSTOLIC BP: 122
STRESS ECHO TARGET HR: 160
STRESS/REST PERFUSION RATIO: 1.02
SYSTOLIC BLOOD PRESSURE - MUSE: NORMAL MMHG
T AXIS - MUSE: 69 DEGREES
VENTRICULAR RATE- MUSE: 87 BPM
WBC # BLD AUTO: 10.2 10E3/UL (ref 4–11)

## 2025-05-02 PROCEDURE — 250N000011 HC RX IP 250 OP 636: Performed by: NURSE PRACTITIONER

## 2025-05-02 PROCEDURE — 36415 COLL VENOUS BLD VENIPUNCTURE: CPT | Performed by: STUDENT IN AN ORGANIZED HEALTH CARE EDUCATION/TRAINING PROGRAM

## 2025-05-02 PROCEDURE — 93018 CV STRESS TEST I&R ONLY: CPT | Performed by: INTERNAL MEDICINE

## 2025-05-02 PROCEDURE — 97530 THERAPEUTIC ACTIVITIES: CPT | Mod: GP

## 2025-05-02 PROCEDURE — A9500 TC99M SESTAMIBI: HCPCS | Performed by: INTERNAL MEDICINE

## 2025-05-02 PROCEDURE — 99239 HOSP IP/OBS DSCHRG MGMT >30: CPT | Performed by: STUDENT IN AN ORGANIZED HEALTH CARE EDUCATION/TRAINING PROGRAM

## 2025-05-02 PROCEDURE — 78452 HT MUSCLE IMAGE SPECT MULT: CPT | Mod: 26 | Performed by: INTERNAL MEDICINE

## 2025-05-02 PROCEDURE — 250N000013 HC RX MED GY IP 250 OP 250 PS 637: Performed by: INTERNAL MEDICINE

## 2025-05-02 PROCEDURE — 85014 HEMATOCRIT: CPT | Performed by: STUDENT IN AN ORGANIZED HEALTH CARE EDUCATION/TRAINING PROGRAM

## 2025-05-02 PROCEDURE — 250N000013 HC RX MED GY IP 250 OP 250 PS 637: Performed by: STUDENT IN AN ORGANIZED HEALTH CARE EDUCATION/TRAINING PROGRAM

## 2025-05-02 PROCEDURE — 82310 ASSAY OF CALCIUM: CPT | Performed by: STUDENT IN AN ORGANIZED HEALTH CARE EDUCATION/TRAINING PROGRAM

## 2025-05-02 PROCEDURE — 99232 SBSQ HOSP IP/OBS MODERATE 35: CPT | Mod: FS | Performed by: NURSE PRACTITIONER

## 2025-05-02 PROCEDURE — 343N000001 HC RX 343 MED OP 636: Performed by: INTERNAL MEDICINE

## 2025-05-02 PROCEDURE — 97161 PT EVAL LOW COMPLEX 20 MIN: CPT | Mod: GP

## 2025-05-02 PROCEDURE — 97116 GAIT TRAINING THERAPY: CPT | Mod: GP

## 2025-05-02 PROCEDURE — 93017 CV STRESS TEST TRACING ONLY: CPT

## 2025-05-02 PROCEDURE — 78452 HT MUSCLE IMAGE SPECT MULT: CPT

## 2025-05-02 RX ORDER — DIGOXIN 125 MCG
125 TABLET ORAL DAILY
Qty: 30 TABLET | Refills: 0 | Status: ON HOLD | OUTPATIENT
Start: 2025-05-03 | End: 2025-05-30

## 2025-05-02 RX ORDER — CARVEDILOL 25 MG/1
50 TABLET ORAL 2 TIMES DAILY WITH MEALS
Qty: 120 TABLET | Refills: 0 | Status: ON HOLD | OUTPATIENT
Start: 2025-05-02 | End: 2025-05-30

## 2025-05-02 RX ORDER — DIGOXIN 125 MCG
125 TABLET ORAL DAILY
Status: DISCONTINUED | OUTPATIENT
Start: 2025-05-02 | End: 2025-05-02 | Stop reason: HOSPADM

## 2025-05-02 RX ADMIN — DIGOXIN 250 MCG: 0.25 INJECTION INTRAMUSCULAR; INTRAVENOUS at 01:19

## 2025-05-02 RX ADMIN — DIGOXIN 125 MCG: 125 TABLET ORAL at 14:05

## 2025-05-02 RX ADMIN — ALLOPURINOL 300 MG: 300 TABLET ORAL at 09:27

## 2025-05-02 RX ADMIN — ASPIRIN 81 MG CHEWABLE TABLET 81 MG: 81 TABLET CHEWABLE at 09:27

## 2025-05-02 RX ADMIN — CARVEDILOL 50 MG: 25 TABLET, FILM COATED ORAL at 09:27

## 2025-05-02 RX ADMIN — LEVOTHYROXINE SODIUM 75 MCG: 75 TABLET ORAL at 09:27

## 2025-05-02 RX ADMIN — Medication 35.8 MILLICURIE: at 07:30

## 2025-05-02 RX ADMIN — LISINOPRIL 20 MG: 20 TABLET ORAL at 09:27

## 2025-05-02 RX ADMIN — APIXABAN 5 MG: 5 TABLET, FILM COATED ORAL at 09:27

## 2025-05-02 ASSESSMENT — ACTIVITIES OF DAILY LIVING (ADL)
ADLS_ACUITY_SCORE: 41
DEPENDENT_IADLS:: INDEPENDENT
ADLS_ACUITY_SCORE: 41
ADLS_ACUITY_SCORE: 41

## 2025-05-02 NOTE — PROGRESS NOTES
[facility]    Cardiology Progress Note    Primary Cardiologist: Previously seen by Dr. Tipton, has not been seen in many years - hospital consult Dr. Garcia     Date of Admission: 4/29/2025  Service Date: 05/02/25    Summary:  Mr. Michael Esteban is a very pleasant 59 year old male with a past medical history of ascending aortic aneurysm s/p repair with Gelweave 30 mm interposition graft and PFO closure in 2015 (normal aortic valve), mild prox RCA plaque at time per aortic repair, solitary kidney as he was kidney donor in 1980's, gout, hypothyroidism, HTN, HLD, BERNADETTE on CPAP, tobacco abuse who was admitted on 4/29/2025 for several days of palpitations and dizziness with a brief near syncopal episode without LOC . Cardiology was consulted for atrial flutter.    Interval History   No acute events overnight.  Heart rates better controlled following IV digoxin yesterday.  Awaiting results of nuclear stress test.    Telemetry: Atrial flutter, rates 80s to 90s    Assessment & Plan   Atrial flutter / Afib, [id number] score of 3- rapid initially, slowed with dilitaizem in ER,  Rate control improved following escalated dosing of carvedilol to 50 mg twice daily and IV digoxin  New cardiomyopathy, LVEF 40-45 with with apical hypokinesis including the distal anterior, anterolateral, and inferolateral wall. Etiology unclear but possibly tachy-mediated.  Day 2 of Lexiscan nuclear stress test completed today and results are pending  Ischemic stroke on CT, suggestive of chronic, repeat head CT done showing stable findings with chronic right parietal infarct, okay to initiate anticoagulation from stroke neurology standpoint without aspirin  Dizziness, memory issues  HTN -on high dose carvedilol, lisinopril resumed following improved renal function for additional hypertensive control, goal less than 130/80 per stroke neurology  History of ascending aortic aneurysm s/p repair with Gelweave 30 mm  interposition graft and PFO closure in 2015  AMAYA - creatinine down trending from peak 2 to 1.37 today  Solitary kidney as he was kidney donor in 1980's  Tobacco abuse     Plan:   1.  Continue Eliquis 5 mg twice daily for cardioembolic prophylaxis  2.  Consider outpatient cardioversion in 1 month  3.  Continue atorvastatin 40 mg daily  4.  Continue carvedilol 50 mg twice daily and lisinopril 20 mg daily, will need outpatient titration of GDMT  5.  Will review results of Lexiscan nuclear stress test when available, if no concerning findings of ischemia, cardiology will plan to sign off and arrange outpatient follow-up with electrophysiology team    Thank you for the opportunity to participate in this pleasant patient's care.     NICK Cat, CNP   Nurse Practitioner  [facility]  (8am - 5pm, M-F)    Patient Active Problem List   Diagnosis    history of Thoracic aortic aneurysm without rupture - 4.7 cm on 9/8/2014 increased to 5.2 cm 1/2015 - repaired with gortex graft 2/17/2015     Kidney donor    Essential hypertension with goal blood pressure less than 140/90    Chronic gout of multiple sites, unspecified cause - sees Dr. Hope [facility]     Hypothyroidism, unspecified type    Vertigo    Tobacco use disorder- pipe/cigar -started smoking cigars/pipe again 6/2015    Atypical chest pain    Alcohol abuse - cut down  s/p TAA repair 2/27/2015 - down to 2-3 beers/week    S/P ascending aortic aneurysm repair    Fever    CARDIOVASCULAR SCREENING; LDL GOAL LESS THAN 100- secondary to hx of TAA repair    SOB (shortness of breath)    BERNADETTE (obstructive sleep apnea)    Pain in joint, ankle and foot    Arthralgia of lower leg    Erectile dysfunction due to diseases classified elsewhere    Morbid (severe) obesity due to excess calories (H)    Umbilical hernia without obstruction and without gangrene    Palpitations    Confusion    Chest discomfort    AMAYA (acute kidney  injury)    Atrial fibrillation with RVR (H)    Syncope, unspecified syncope type       Physical Exam   Temp: 98.5  F (36.9  C) Temp src: Oral BP: (!) 140/99 Pulse: 105   Resp: 18 SpO2: 96 % O2 Device: None (Room air)    Vitals:    05/01/25 0205 05/02/25 0649   Weight: (!) 151.1 kg (333 lb 1.6 oz) (!) 150.6 kg (332 lb)     Vital Signs with Ranges  Temp:  [98.5  F (36.9  C)-98.7  F (37.1  C)] 98.5  F (36.9  C)  Pulse:  [] 105  Resp:  [15-18] 18  BP: (121-151)/() 140/99  SpO2:  [94 %-100 %] 96 %  I/O last 3 completed shifts:  In: 1500 [P.O.:1500]  Out: 200 [Urine:200]    Constitutional:  Appears his stated age, well nourished, and in no acute distress.  Eyes: Pupils equal, round. Sclerae anicteric.   HEENT: Normocephalic, atraumatic.   Neck: Supple.  Respiratory: Breathing non-labored. Lungs clear to auscultation bilaterally. No crackles, wheezes, rhonchi, or rales.  Cardiovascular: Irregularly irregular rate and rhythm, normal S1 and S2. No murmur, rub, or gallop.  GI: Soft  Skin: Warm, dry.   Musculoskeletal/Extremities: Moves all extremities well and symmetrically. No edema.  Neurologic: No gross focal deficits. Alert, awake, and oriented to person, place and time.  Psychiatric: Affect appropriate. Mentation normal.    Medications   Current Facility-Administered Medications   Medication Dose Route Frequency Provider Last Rate Last Admin     Current Facility-Administered Medications   Medication Dose Route Frequency Provider Last Rate Last Admin    allopurinol (ZYLOPRIM) tablet 300 mg  300 mg Oral Daily Delmyrbrakel Jacqueline E, DO   300 mg at 05/01/25 0803    apixaban ANTICOAGULANT (ELIQUIS) tablet 5 mg  5 mg Oral BID Alisia Jacqueline E, DO   5 mg at 05/01/25 2041    aspirin (ASA) chewable tablet 81 mg  81 mg Oral Daily Janie Crumpson E, DO   81 mg at 05/01/25 0803    carvedilol (COREG) tablet 50 mg  50 mg Oral BID w/meals Jacqueline Crump DO   50 mg at 05/01/25 1821    levothyroxine  (SYNTHROID/LEVOTHROID) tablet 75 mcg  75 mcg Oral Daily Scharbrakel Jacqueline E, DO   75 mcg at 05/01/25 0803    lisinopril (ZESTRIL) tablet 20 mg  20 mg Oral Daily Janie Crumpson E, DO   20 mg at 04/30/25 1548    sodium chloride (PF) 0.9% PF flush 3 mL  3 mL Intravenous Q8H Angelica Cooney MD   3 mL at 05/02/25 0122       Data   Recent Results (from the past 24 hours)   CT Head w/o Contrast    Narrative    EXAM: CT HEAD W/O CONTRAST  LOCATION: [facility]  DATE: 5/1/2025    INDICATION: stroke follow up  COMPARISON: [facility] 4/29/2025  TECHNIQUE: Routine CT Head without IV contrast. Multiplanar reformats. Dose reduction techniques were used.    FINDINGS:  INTRACRANIAL CONTENTS: No intracranial hemorrhage, extraaxial collection, or mass effect.  No CT evidence of acute infarct. Unchanged chronic encephalomalacia and gliosis in the right parietal lobe. Mild presumed chronic small vessel ischemic changes.   Mild generalized volume loss. No hydrocephalus.     VISUALIZED ORBITS/SINUSES/MASTOIDS: No intraorbital abnormality. Mild mucosal thickening involving ethmoid septa and inferior right maxillary sinus. No middle ear or mastoid effusion.    BONES/SOFT TISSUES: No acute abnormality.      Impression    IMPRESSION:  1.  No CT evidence for acute intracranial process or significant change compared to 4/29/2025.  2.  Chronic appearing right parietal lobe infarct and background of presumed chronic senescent changes as seen previously.   NM Lexiscan stress test (nuc card)   Result Value    Target     Baseline Systolic     Baseline Diastolic BP 85    Last Stress Systolic     Last Stress Diastolic BP 88    Baseline HR 85    Max HR  86    Max Predicted HR  54    Rate Pressure Product 10,492.0       Recent Labs   Lab 05/02/25  0604 05/01/25  0553 04/30/25  0827   WBC 10.2 8.5 8.2   HGB 13.8 13.9 13.7   HCT 43.4 43.0 43.1   MCV 95 94 95    205 187     Recent Labs   Lab 05/02/25  0604  05/01/25  0553 04/30/25  0827    140 139   POTASSIUM 4.6 4.3 4.9   CHLORIDE 100 102 103   CO2 29 29 27   ANIONGAP 10 9 9   * 104* 116*   BUN 16.6 17.7 21.0   CR 1.37* 1.53* 1.71*   GFRESTIMATED 59* 52* 45*   CATY 9.2 9.3 9.0        This note was completed in part using Dragon voice recognition software. Although reviewed after completion, some word and grammatical errors may occur.

## 2025-05-02 NOTE — PLAN OF CARE
Goal Outcome Evaluation:      Plan of Care Reviewed With: patient      Shift: [id number]    Admitting Dx: New cardiomyopathy, A Fib RVR/ Flutter.  Orientation: A&O 4. Very forgetful.  Neuro- Intact  VS/Tele/Cardiac- VSS on RA. Tele-A flutter. HR rate 115-140s with activity  Resp- LS-Diminished   Diet: Reg  Activity- SBA  Pain- Denies  Drips- R PIV SL  Drains/Tubes- n/a  Skin- wdl  GI/- Cont B/B. Up to BR  Aggression Color- Green   Plan- Nuc test, Head MRI if able to tolerate.  Misc- Neuro/Cardiology/PT/CC following    Dave Weiss RN

## 2025-05-02 NOTE — DISCHARGE SUMMARY
[facility]  Hospitalist Discharge Summary      Date of Admission:  4/29/2025  Date of Discharge:  5/2/2025  4:40 PM  Discharging Provider: Jacqueline Crump DO  Discharge Service: Hospitalist Service    Discharge Diagnoses   See below    Clinically Significant Risk Factors     # Morbid Obesity: Estimated body mass index is 45.03 kg/m  as calculated from the following:    Height as of this encounter: 1.829 m (6').    Weight as of this encounter: 150.6 kg (332 lb).       Follow-ups Needed After Discharge   Follow-up Appointments       Hospital Follow-up with Existing Primary Care Provider (PCP)          Schedule Primary Care visit within: 7 Days               Discharge Disposition   Discharged to home  Condition at discharge: Stable    Hospital Course   Michael Esteban is a 59 year old male admitted on 4/29/2025 from clinic for palpitation and fainting. He was found to be in Afib with RVR while in clinic and sent to ER via EMS. He had head CT performed here which found chronic parietal infarct and neurology consulted. Started on eliquis for new Afib with CVA. Also found to have WMA on echo. He had stress test with returned abnormal however cardiology cleared to discharge home with follow up as they suspect false positive for diaphragmatic attenuation. Rate were better on discharge but still in Aflutter with elevation on activity. Will have follow up in 4-6 weeks to assess need for cardioversion     New onset Aflutter with RVR  Syncope  Presented to clinic for intermittent symptoms of palpitations, chest tightness and dizziness the last few days. He did have episode of syncope just before his clinic visit. HR found to be in to the 180s SVT vs Afib. He was given adenosine x2 which slowed his rate but he did not convert. Presented to the ER via EMS where HR remained elevated he was started on dilt gtt and convert to NSR. Troponin returned at 14. TSH normal     - TTE returned with mild WMA  throughout LV, cardiology consulted and stress test performed which returned abnormal however cardiology believes likely false positive. They are OK with discharge and follow up  - started eliquis here  - will discharge with increase coreg dose and new digoxin     New onset confusion with memory lapses  R parietal CVA  While in the ER he off handily reported he was fired form his [employer] job due to having new on set confusion, memory issues and inability to perform previously normal tasks. Wife confirms new confusion     - CT positive for chronic R parietal stroke  - TTE without thrombus  - LDL noted to be quite low so plan to stop statin for now with LDL repeat  - eliquis for new Afib     AMAYA on CKD  Live kidney donor s/p nephrectomy  Cr baseline ~1.5  -  resolved     Essential HTN  - resume coreg at increased dose  - resume home lisinopril     Hypothyroidism  - resume PTA levothyroxine     AAA with repair in 2015  - noted continue PTA statin and asa     Gout  - continue PTA allopurinol     BERNADETTE  - home cpap settings    Consultations This Hospital Stay   NEUROLOGY IP STROKE CONSULT  PHARMACY LIAISON FOR MEDICATION COVERAGE CONSULT  CARDIOLOGY IP CONSULT  CARE MANAGEMENT / SOCIAL WORK IP CONSULT  PHYSICAL THERAPY ADULT IP CONSULT  [facility] HEALTH SERVICES IP CONSULT    Code Status   Full Code    Time Spent on this Encounter   I, Jacqueline Crump DO, personally saw the patient today and spent greater than 30 minutes discharging this patient.       Jacqueline Crump DO  [facility] CORONARY CARE UNIT  [address], [address]  [facility] [address]  Phone: [phone]  ______________________________________________________________________    Physical Exam   Vital Signs: Temp: 98.7  F (37.1  C) Temp src: Oral BP: (!) 148/108 Pulse: 89   Resp: 18 SpO2: 96 % O2 Device: None (Room air)    Weight: 332 lbs 0 oz         Primary Care Physician   Pricila Coburn    Discharge Orders      Physical Therapy   Referral      Occupational Therapy  Referral      Reason for your hospital stay    You presented for a fast heart rate and were found to be in something called Aflutter. You were also found to have a stroke that is likely old and related to being in Aflutter. You had medication changes listed below that you should follow for now and then have close follow up in a month with cardiology and a few weeks with neurology to see how you are doing. You may need a cardioversion in the future. These medication changes today may still end up changing again in the future. Your cholesterol is actually quite low and we recommend holding your statin medication for now and have follow up labs in a few weeks to see how it is doing     Activity    3Your activity upon discharge: activity as tolerated     Diet    Follow this diet upon discharge: Current Diet:Orders Placed This Encounter      Regular Diet Adult     Stroke Hospital Follow Up (for neurologist use only)    [facility] to [facility] care as prescribed by your provider. If you don t hear from a representative within 2 business days, please call [phone].       Hospital Follow-up with Existing Primary Care Provider (PCP)            Significant Results and Procedures   Results for orders placed or performed during the hospital encounter of 04/29/25   Head CT w/o contrast    Narrative    HEAD AND NECK CT ANGIOGRAM WITH IV CONTRAST  [facility]  4/29/2025 6:29 PM CDT    INDICATION: Increased confusion for the last several weeks, new diagnosis A fib, evaluate for evidence of stroke  TECHNIQUE: Head and neck CT angiogram with IV contrast. Noncontrast head CT followed by axial helical CT images of the head and neck vessels obtained during the arterial phase of intravenous contrast administration. Axial helical 2D reconstructed images   and multiplanar 3D MIP reconstructed images of the head and neck vessels were  performed by the technologist. Dose reduction techniques were used. Vessel stenoses reported according to NASCET criteria.  CONTRAST: 69 mL Isovue 370  COMPARISON: None.    FINDINGS:  NONCONTRAST HEAD CT: Chronic appearing right parietal infarct. No definite acute infarct. No hemorrhage. The ventricles and sulci are normal for age. Osseous structures are intact. Mild inflammatory mucosal thickening in the sinuses. The mastoid air   cells are free of significant disease. The orbits are unremarkable.    HEAD CTA: The intracranial circulation is patent without evidence for aneurysm, proximal vessel occlusion, high-grade intracranial stenosis or arteriovenous malformation.  Patent dural venous sinuses.    NECK CTA: Three vessel arch.  Carotid arteries are patent with mild atherosclerotic change.  No hemodynamically significant stenosis by NASCET criteria in either carotid system.  Mild vertebral artery atherosclerosis. No significant narrowing or   dissection.      Impression    CONCLUSION:  HEAD CT:  1.  Chronic appearing right parietal infarct. No definite acute infarct.  2.  No hemorrhage.    HEAD CTA:  1.  Negative.  2.  No vessel stenosis, occlusion or aneurysm.    NECK CTA:  1.  Mild carotid and vertebral artery atherosclerosis.  2.  No dissection or hemodynamically significant narrowing in the neck by NASCET criteria.     CTA Head Neck with Contrast    Narrative    HEAD AND NECK CT ANGIOGRAM WITH IV CONTRAST  [facility]  4/29/2025 6:29 PM CDT    INDICATION: Increased confusion for the last several weeks, new diagnosis A fib, evaluate for evidence of stroke  TECHNIQUE: Head and neck CT angiogram with IV contrast. Noncontrast head CT followed by axial helical CT images of the head and neck vessels obtained during the arterial phase of intravenous contrast administration. Axial helical 2D reconstructed images   and multiplanar 3D MIP reconstructed images of the head and neck vessels were  performed by the technologist. Dose reduction techniques were used. Vessel stenoses reported according to NASCET criteria.  CONTRAST: 69 mL Isovue 370  COMPARISON: None.    FINDINGS:  NONCONTRAST HEAD CT: Chronic appearing right parietal infarct. No definite acute infarct. No hemorrhage. The ventricles and sulci are normal for age. Osseous structures are intact. Mild inflammatory mucosal thickening in the sinuses. The mastoid air   cells are free of significant disease. The orbits are unremarkable.    HEAD CTA: The intracranial circulation is patent without evidence for aneurysm, proximal vessel occlusion, high-grade intracranial stenosis or arteriovenous malformation.  Patent dural venous sinuses.    NECK CTA: Three vessel arch.  Carotid arteries are patent with mild atherosclerotic change.  No hemodynamically significant stenosis by NASCET criteria in either carotid system.  Mild vertebral artery atherosclerosis. No significant narrowing or   dissection.      Impression    CONCLUSION:  HEAD CT:  1.  Chronic appearing right parietal infarct. No definite acute infarct.  2.  No hemorrhage.    HEAD CTA:  1.  Negative.  2.  No vessel stenosis, occlusion or aneurysm.    NECK CTA:  1.  Mild carotid and vertebral artery atherosclerosis.  2.  No dissection or hemodynamically significant narrowing in the neck by NASCET criteria.     XR Chest Port 1 View    Narrative    EXAM: XR CHEST PORT 1 VIEW  LOCATION: [facility]  DATE: 4/29/2025    INDICATION: SVT  COMPARISON: None.      Impression    IMPRESSION:  1.  Clear lungs. No beth pulmonary edema.  2.  No pleural effusion or pneumothorax.  3.  Mild cardiomegaly.  4.  Widened vascular pedicle and pulmonary venous congestion suggesting fluid overload.      CT Head w/o Contrast    Narrative    EXAM: CT HEAD W/O CONTRAST  LOCATION: [facility]  DATE: 5/1/2025    INDICATION: stroke follow up  COMPARISON: CTA 4/29/2025  TECHNIQUE:  Routine CT Head without IV contrast. Multiplanar reformats. Dose reduction techniques were used.    FINDINGS:  INTRACRANIAL CONTENTS: No intracranial hemorrhage, extraaxial collection, or mass effect.  No CT evidence of acute infarct. Unchanged chronic encephalomalacia and gliosis in the right parietal lobe. Mild presumed chronic small vessel ischemic changes.   Mild generalized volume loss. No hydrocephalus.     VISUALIZED ORBITS/SINUSES/MASTOIDS: No intraorbital abnormality. Mild mucosal thickening involving ethmoid septa and inferior right maxillary sinus. No middle ear or mastoid effusion.    BONES/SOFT TISSUES: No acute abnormality.      Impression    IMPRESSION:  1.  No CT evidence for acute intracranial process or significant change compared to 2025.  2.  Chronic appearing right parietal lobe infarct and background of presumed chronic senescent changes as seen previously.   Echocardiogram Complete     Value    LVEF  40-45%    Narrative    [id number]  [id number]  [id number]  [id number]^ASHLEY^LEA^ARTEMIO     [facility]  Echocardiography Laboratory  [address] [place] [address]     Name: JOSEFINA LEDEZMA  MRN: [id number]  : 1965  Study Date: 2025 10:55 AM  Age: 59 yrs  Gender: Male  Patient Location: [facility]  Reason For Study: Aflutter  Ordering Physician: LEA RAMIREZ  Referring Physician: Pricila Coburn MD  Performed By: Stephanie Friedman     BSA: 2.7 m2  Height: 70 in  Weight: 366 lb  HR: 89  BP: 120/86 mmHg  ______________________________________________________________________________  Procedure  Echocardiogram with two-dimensional, color and spectral Doppler. Definity (NDC  #[id number]) given intravenously.  ______________________________________________________________________________  Interpretation Summary     Left ventricular systolic function is mild to moderately reduced.  The visual ejection fraction is 40-45%.  Apical hypokinesis, including the distal  anterior, anterolateral, and  inferolateral walls.  Aortic root dilatation is present, 4.3 cm  Ascending aorta dilatation is present, 4.0 cm  No significant valve disease.  The study was technically difficult. Compared to the previous echo from 2017,  the LV function has decreased, WMA are new, and the aorta is unchanged.  ______________________________________________________________________________  Left Ventricle  The left ventricle is normal in size. There is normal left ventricular wall  thickness. Diastolic Doppler findings (E/E' ratio and/or other parameters)  suggest left ventricular filling pressures are indeterminate. Left ventricular  systolic function is mild to moderately reduced. The visual ejection fraction  is 40-45%. Apical hypokinesis, including the distal anterior, anterolateral,  and inferolateral walls.     Right Ventricle  The right ventricle is normal in structure, function and size.     Atria  Normal left atrial size. Right atrial size is normal. There is no atrial shunt  seen.     Mitral Valve  The mitral valve is normal in structure and function. There is trace mitral  regurgitation.     Tricuspid Valve  The tricuspid valve is normal in structure and function. There is trace  tricuspid regurgitation. Right ventricular systolic pressure could not be  approximated due to inadequate tricuspid regurgitation. IVC diameter and  respiratory changes fall into an intermediate range suggesting an RA pressure  of 8 mmHg.     Aortic Valve  The aortic valve is normal in structure and function. No aortic regurgitation  is present. No aortic stenosis is present.     Pulmonic Valve  The pulmonic valve is not well seen, but is grossly normal. There is trace  pulmonic valvular regurgitation.     Vessels  Aortic root dilatation is present. 4.3 cm. Ascending aorta dilatation is  present. 4.0 cm.     Pericardium  There is no pericardial effusion.      ______________________________________________________________________________  MMode/2D Measurements & Calculations  IVSd: 1.2 cm  LVIDd: 5.4 cm  LVIDs: 4.1 cm  LVPWd: 1.1 cm  FS: 24.1 %  LV mass(C)d: 249.4 grams  LV mass(C)dI: 92.4 grams/m2     Ao root diam: 4.3 cm  asc Aorta Diam: 4.0 cm  Ao root diam index Ht(cm/m): 2.4  Ao root diam index BSA (cm/m2): 1.6  Asc Ao diam index BSA (cm/m2): 1.5  Asc Ao diam index Ht(cm/m): 2.2  EF Biplane: 45.0 %  RWT: 0.41  TAPSE: 2.6 cm     Doppler Measurements & Calculations  MV E max derik: 104.5 cm/sec  MV dec slope: 562.7 cm/sec2  MV dec time: 0.19 sec  PA acc time: 0.12 sec  E/E' avg: 10.8  Lateral E/e': 10.1     Medial E/e': 11.4  RV S Derik: 7.7 cm/sec     ______________________________________________________________________________  Report approved by: Nahum Nichols MD on 04/30/2025 12:31 PM         NM Lexiscan stress test (nuc card)     Value    Target     Baseline Systolic     Baseline Diastolic BP 85    Last Stress Systolic     Last Stress Diastolic BP 88    Baseline HR 85    Max HR  86    Max Predicted HR  54    Rate Pressure Product 10,492.0    BP 46    Left Ventricular EF 40    Stress/rest perfusion ratio 1.02    Narrative      The nuclear stress test is abnormal.    There is a small area of nontransmural infarction at the apex   associated with a medium sized area of noel-infarct ischemia involving the   mid inferior, mid inferolateral, apical inferior, apical septal, and apex   wall segments.    Left ventricular function is mildly reduced.    The left ventricular ejection fraction at rest is 46%.  The left   ventricular ejection fraction at stress is 40%.    LV chamber size mildly enlarged.    Stress to rest cavity ratio is 1.02.  TID is absent.    There is no prior study for comparison.         Discharge Medications   Current Discharge Medication List        START taking these medications    Details   apixaban ANTICOAGULANT (ELIQUIS) 5 MG tablet  Take 1 tablet (5 mg) by mouth 2 times daily.  Qty: 60 tablet, Refills: 0    Associated Diagnoses: Atrial fibrillation with RVR (H); Cerebrovascular accident (CVA), unspecified mechanism (H)      digoxin (LANOXIN) 125 MCG tablet Take 1 tablet (125 mcg) by mouth daily.  Qty: 30 tablet, Refills: 0    Associated Diagnoses: Atrial fibrillation with RVR (H)           CONTINUE these medications which have CHANGED    Details   carvedilol (COREG) 25 MG tablet Take 2 tablets (50 mg) by mouth 2 times daily (with meals).  Qty: 120 tablet, Refills: 0    Associated Diagnoses: Atrial fibrillation with RVR (H)           CONTINUE these medications which have NOT CHANGED    Details   acetaminophen (TYLENOL) 500 MG tablet Take 500 mg by mouth every 6 hours as needed for mild pain      allopurinol (ZYLOPRIM) 300 MG tablet Take 300 mg by mouth daily.      levothyroxine (SYNTHROID/LEVOTHROID) 75 MCG tablet Take 1 tablet (75 mcg) by mouth daily  Qty: 90 tablet, Refills: 3    Comments: hold rx until pt calls to fill, please.  Associated Diagnoses: Hypothyroidism, unspecified type      lisinopril (ZESTRIL) 20 MG tablet TAKE 1 TABLET BY MOUTH EVERY DAY  Qty: 90 tablet, Refills: 0    Comments: DX Code Needed  .Due for an Office visit for further refills. Please inform patient  Associated Diagnoses: Essential hypertension with goal blood pressure less than 140/90; Thoracic aortic aneurysm without rupture      order for DME Equipment being ordered: RESMED AIRSENSE 10 WITH HH AND RESMED AIRFIT P10 PILLOW MASK.      sildenafil (VIAGRA) 50 MG tablet Take 1 tablet (50 mg) by mouth daily as needed (for erectile dysfunction) 30 min to 4 hrs before sex. Do not use with nitroglycerin, terazosin or doxazosin.  Qty: 12 tablet, Refills: 3    Comments: hold rx until pt calls to fill, please.  Associated Diagnoses: Vasculogenic erectile dysfunction, unspecified vasculogenic erectile dysfunction type           STOP taking these medications       aspirin 81  MG chewable tablet Comments:   Reason for Stopping:         atorvastatin (LIPITOR) 40 MG tablet Comments:   Reason for Stopping:             Allergies   Allergies   Allergen Reactions    Thalidomide      Reaction during stress test

## 2025-05-02 NOTE — CONSULTS
Care Management Initial Consult    General Information  Assessment completed with: Patient, Spouse or significant other,    Type of CM/SW Visit: Initial Assessment    Primary Care Provider verified and updated as needed: Yes   Readmission within the last 30 days: no previous admission in last 30 days      Reason for Consult: other (see comments) (elevated risk score)  Advance Care Planning: Advance Care Planning Reviewed: no concerns identified          Communication Assessment  Patient's communication style: spoken language (English or Bilingual)    Hearing Difficulty or Deaf: no        Cognitive  Cognitive/Neuro/Behavioral: .WDL except  Level of Consciousness: alert  Arousal Level: opens eyes spontaneously  Orientation: oriented x 4  Mood/Behavior: calm, cooperative  Best Language: 0 - No aphasia  Speech: clear    Living Environment:   People in home: spouse  Dacia  Current living Arrangements: house      Able to return to prior arrangements: yes       Family/Social Support:  Care provided by: self, spouse/significant other  Provides care for: no one  Marital Status:   Support system: Wife  Dacia       Description of Support System: Supportive, Involved         Current Resources:   Patient receiving home care services: No        Community Resources: None  Equipment currently used at home: none  Supplies currently used at home:      Employment/Financial:  Employment Status: unemployed        Financial Concerns: unemployed (wife works and has income)   Referral to Financial Worker: No       Does the patient's insurance plan have a 3 day qualifying hospital stay waiver?  No    Lifestyle & Psychosocial Needs:  Social Drivers of Health     Food Insecurity: Low Risk  (4/30/2025)    Food Insecurity     Within the past 12 months, did you worry that your food would run out before you got money to buy more?: No     Within the past 12 months, did the food you bought just not last and you didn t have money to get more?:  No   Depression: At risk (3/17/2025)    Received from [facility]    PHQ-2     PHQ-2 Score: 6   Housing Stability: High Risk (4/30/2025)    Housing Stability     Do you have housing? : No     Are you worried about losing your housing?: No   Tobacco Use: High Risk (4/29/2025)    Received from [facility]    Patient History     Smoking Tobacco Use: Some Days     Smokeless Tobacco Use: Never     Passive Exposure: Not on file   Financial Resource Strain: Low Risk  (4/30/2025)    Financial Resource Strain     Within the past 12 months, have you or your family members you live with been unable to get utilities (heat, electricity) when it was really needed?: No   Alcohol Use: Not on file   Transportation Needs: Low Risk  (4/30/2025)    Transportation Needs     Within the past 12 months, has lack of transportation kept you from medical appointments, getting your medicines, non-medical meetings or appointments, work, or from getting things that you need?: No   Physical Activity: Not on file   Interpersonal Safety: Not on file   Stress: Not on file   Social Connections: Not on file   Health Literacy: Not on file       Functional Status:  Prior to admission patient needed assistance:   Dependent ADLs:: Independent  Dependent IADLs:: Independent       Mental Health Status:  Mental Health Status: No Current Concerns       Chemical Dependency Status:  Chemical Dependency Status: No Current Concerns             Values/Beliefs:  Spiritual, Cultural Beliefs, Muslim Practices, Values that affect care: no               Discussed  Partnership in Safe Discharge Planning  document with patient/family: Yes: patient, wife Dacia    Additional Information:  Consult for financial issues.  Met with pt and wife, Dacia.  Introduced self and role.  Pt currently not working.  Notes stated pt had concerns regarding insurance.  Per Dacia, pt insurance from his previous employer will be ending and she has signed pt on to her insurance through  her work.  CM asked if any resources needed through the county/community and declined need.      Pt has orders to discharge to home.  No discharge needs identified.        Next Steps: No further care management intervention anticipated at this time.  Please re-consult if further needs arise.  Care management signing off.       Blessing Soto RN, BS  Care Coordinator  [email or web address]

## 2025-05-02 NOTE — PLAN OF CARE
Pt aox4 but forgetful, RA SBA, and full code. Tele afib/ aflutter. Pt denies CP and SOB. Cards added po digoxin. Pt started po eliquis today. RN educated the pt on the important precautions of being on a blood thinner. RN educated the pt on earlier warning signs of a stroke prevention.  RN discharged pt into wife's care.

## 2025-05-02 NOTE — PROGRESS NOTES
05/02/25 6105   Appointment Info   Signing Clinician's Name / Credentials (PT) Trinity Agustin, PT, DPT   Living Environment   People in Home spouse   Current Living Arrangements house   Home Accessibility stairs to enter home;stairs within home   Number of Stairs, Main Entrance 1   Stair Railings, Main Entrance none   Number of Stairs, Within Home, Primary greater than 10 stairs   Stair Railings, Within Home, Primary railings safe and in good condition   Transportation Anticipated car, drives self;family or friend will provide   Living Environment Comments Pt mainly stays on first floor, does not need to negotiate stairs   Self-Care   Usual Activity Tolerance good   Current Activity Tolerance moderate   Regular Exercise No   Equipment Currently Used at Home none   Fall history within last six months yes   Number of times patient has fallen within last six months 1   Activity/Exercise/Self-Care Comment Pt is independent at baseline, one fall likely due to syncopal episode prior to admission. Pt and spouse report recent changes in memory/cognition.   General Information   Onset of Illness/Injury or Date of Surgery 04/29/25   Referring Physician Jacqueline Crump, DO   Patient/Family Therapy Goals Statement (PT) To go home   Pertinent History of Current Problem (include personal factors and/or comorbidities that impact the POC) Pt is 59 year old male adm on 4/29/25 from clinic due to palpitations and syncopal episode, found to be in a-fib with RVR. Head CT showed chronic right parietal infarct. Pt and spouse do endorse memory and cognitive changes recently causing him to lose his job as a . Neurology and cardiology consulted, medications adjusted and pt now with better heart rate control, cleared for increased activity. PMH includes AAA repair, HTN, HLD, BERNADETTE, tobacco use   Existing Precautions/Restrictions no known precautions/restrictions   Cognition   Affect/Mental Status (Cognition) WFL    Orientation Status (Cognition) oriented x 3   Follows Commands (Cognition) WFL   Cognitive Status Comments Needs increased time to answer questions, often looks to spouse to answer questions as well.   Pain Assessment   Patient Currently in Pain No   Integumentary/Edema   Integumentary/Edema no deficits were identifed   Posture    Posture Forward head position   Range of Motion (ROM)   Range of Motion ROM is WFL   Strength (Manual Muscle Testing)   Strength (Manual Muscle Testing) strength is WFL   Strength Comments No focal weakness noted, general deconditioning noted   Bed Mobility   Bed Mobility no deficits identified   Transfers   Comment, (Transfers) SBA   Gait/Stairs (Locomotion)   Comment, (Gait/Stairs) SBA, no assistive device   Balance   Balance Comments Pt does demonstrate high level balance deficits, difficulty turning head with ambulation and changing speeds   Clinical Impression   Criteria for Skilled Therapeutic Intervention Yes, treatment indicated   PT Diagnosis (PT) Impaired mobility   Influenced by the following impairments Decreased activity tolerance, high level balance deficits   Functional limitations due to impairments Decreased ability to participate in daily tasks   Clinical Presentation (PT Evaluation Complexity) stable   Clinical Presentation Rationale Current presentation, [facility]   Clinical Decision Making (Complexity) low complexity   Planned Therapy Interventions (PT) balance training;gait training;home exercise program;patient/family education;stair training;strengthening;transfer training   Risk & Benefits of therapy have been explained evaluation/treatment results reviewed;care plan/treatment goals reviewed;risks/benefits reviewed;current/potential barriers reviewed;participants voiced agreement with care plan;participants included;patient;spouse/significant other   PT Total Evaluation Time   PT Eval, Low Complexity Minutes (07774) 10   Physical Therapy Goals   PT Frequency One time  eval and treatment only   PT Predicted Duration/Target Date for Goal Attainment 05/02/25   PT Goals Bed Mobility;Transfers;Gait;Stairs   PT: Bed Mobility Independent;Supine to/from sit;Rolling   PT: Transfers Independent;Sit to/from stand;Bed to/from chair   PT: Gait Independent;150 feet   PT: Stairs Supervision/stand-by assist;3 stairs   Interventions   Interventions Quick Adds Gait Training;Therapeutic Activity   Therapeutic Activity   Therapeutic Activities: dynamic activities to improve functional performance Minutes (10915) 15   Symptoms Noted During/After Treatment None   Treatment Detail/Skilled Intervention Pt OOB in chair on arrival, hypertensive(159/110), HR in 80's. Pt completes sit to stand transfer with SBA, denies dizziness/lightheadedness with position change. Education provided on safety with increasing mobility and taking time to adjust to position changes, pt states understanding. Pt completes 3 reps sit to stand transfers progressing to independent with this activity. Time spent in education on safety with activity, safely progressing activity, and recommendations for oupatient PT to address high level balance and outpatient OT for new cognitive concerns. Pt and family in agreement, referrals were placed. At completion of session pt OOB in chair, needs within reach, RN  updated.   Gait Training   Gait Training Minutes (38536) 10   Symptoms Noted During/After Treatment (Gait Training) none   Treatment Detail/Skilled Intervention Pt ambulates 300' without assistive device with CGA progressing to independent, denies dizziness/lightheadedness. Walking on level surface at slow speed pt is independent. When trialing head turns and change in speed pt needing SBA to CGA and unable to ambulate and turn head at same time. HR remained in 80's throughout ambulation trial, 's/110's after ambulation trial. RN aware.   PT Discharge Planning   PT Plan Discharge   PT Discharge Recommendation (DC Rec) home  with assist;home with outpatient physical therapy   PT Rationale for DC Rec Pt is able to complete mobility necessary for discharge to home, would benefit from assist for higher level tasks due to overall deconditioning and balance deficits. Recommend outpatient PT to further address.   PT Brief overview of current status Goals of therapy will be to address safe mobility and make recs for d/c to next level of care. Pt and RN will continue to follow all falls risk precautions as documented by RN staff while hospitalized   PT Total Distance Amb During Session (feet) 300   Physical Therapy Time and Intention   Timed Code Treatment Minutes 25   Total Session Time (sum of timed and untimed services) 35

## 2025-05-03 ENCOUNTER — HEALTH MAINTENANCE LETTER (OUTPATIENT)
Age: 60
End: 2025-05-03

## 2025-05-04 ENCOUNTER — PATIENT OUTREACH (OUTPATIENT)
Dept: CARE COORDINATION | Facility: CLINIC | Age: 60
End: 2025-05-04
Payer: COMMERCIAL

## 2025-05-04 NOTE — PROGRESS NOTES
[facility]    Background: Transitional Care Management program identified per system criteria and reviewed by [facility] team for possible outreach.    Assessment: Upon chart review, [facility] Team member will not proceed with patient outreach related to this episode of Transitional Care Management program due to reason below:    Patient declined to answer all post hospital discharge questions with [facility] team member and disconnected call.    Plan: Transitional Care Management episode addressed appropriately per reason noted above.      ELIECER Lopez  [facility]    *Connected Care Resource Team does NOT follow patient ongoing. Referrals are identified based on internal discharge reports and the outreach is to ensure patient has an understanding of their discharge instructions.

## 2025-05-05 ENCOUNTER — TELEPHONE (OUTPATIENT)
Dept: CARDIOLOGY | Facility: CLINIC | Age: 60
End: 2025-05-05
Payer: COMMERCIAL

## 2025-05-05 ENCOUNTER — PATIENT OUTREACH (OUTPATIENT)
Dept: CARE COORDINATION | Facility: CLINIC | Age: 60
End: 2025-05-05
Payer: COMMERCIAL

## 2025-05-05 ENCOUNTER — THERAPY VISIT (OUTPATIENT)
Dept: PHYSICAL THERAPY | Facility: CLINIC | Age: 60
End: 2025-05-05
Attending: STUDENT IN AN ORGANIZED HEALTH CARE EDUCATION/TRAINING PROGRAM
Payer: COMMERCIAL

## 2025-05-05 DIAGNOSIS — I63.9 CEREBROVASCULAR ACCIDENT (CVA), UNSPECIFIED MECHANISM (H): ICD-10-CM

## 2025-05-05 DIAGNOSIS — I48.91 ATRIAL FIBRILLATION WITH RVR (H): Primary | ICD-10-CM

## 2025-05-05 PROCEDURE — 97112 NEUROMUSCULAR REEDUCATION: CPT | Mod: GP | Performed by: PHYSICAL THERAPIST

## 2025-05-05 PROCEDURE — 97161 PT EVAL LOW COMPLEX 20 MIN: CPT | Mod: GP | Performed by: PHYSICAL THERAPIST

## 2025-05-05 NOTE — TELEPHONE ENCOUNTER
"Patient was admitted to [facility] on 4/29/25 with several days of palpitations and dizziness with a brief near syncopal episode without LOC. He was seen in urgent care initially and and sent to the ER via EMS. Found to be in rapid atrial flutter. Per notes EMS administered 6 mg adenosine and then 12 mg adenosine x 2 with flutter waves on EKG. Started on Diltiazem gtt    PMH: ascending aortic aneurysm s/p repair with Gelweave 30 mm interposition graft and PFO closure in 2015 (normal aortic valve), mild prox RCA plaque at time per aortic repair, solitary kidney as he was kidney donor in 1980's, gout, hypothyroidism, HTN, HLD, BERNADETTE on CPAP, tobacco abuse.     Head CT showed chronic appearing right parietal infarct, no definite acute infarct or hemorrhage, CTA neck with mild carotid and vertebral artery atherosclerosis.    4/30/25: Echo showed EF of 40-45% with apical hypokinesis including the distal anterior, anterolateral, and inferolateral walls. Etiology unclear but possibly tachy-mediated.     5/2/25: NM Lexiscan preliminarily with some inferior ischemia, with the absence of troponin elevation despite elevated heart rates, suspect may have a component of diaphragmatic attenuation and false positive.     Per Dr. Garcia's IP note, \"Would recommend conservative management of possible coronary artery disease and focus outpatient workup mainly on his arrhythmia.\" \"Continue carvedilol and digoxin on discharge. Follow-up in cardiology clinic 2 to 4 weeks. Could consider elective cardioversion in 3 weeks of anticoagulation. If patient has exertional chest discomfort and dyspnea on exertion, could pursue cardiac catheterization as an outpatient.\"    No driving per Neurology until outpatient follow up.     Pt was started on Eliquis, Digoxin. PTA Coreg dosage was increased. ASA and Lipitor were discontinued at time of discharge.    Called patient to discuss any post hospital d/c questions he may have, review medication changes, and " confirm f/u appts. Patient denied any questions regarding new medications or changes to PTA medications.     Patient denied any SOB, chest pain, palpitations or light headedness.    RN confirmed with patient that he needs to be scheduled for a cardiology STEFANIE OV as ordered. Directed to scheduling phone number on AVS.    Patient advised to call clinic with any cardiac related questions or concerns prior to this stefanie't. Patient verbalized understanding and agreed with plan. KAREEN Rodriguez RN.

## 2025-05-05 NOTE — PROGRESS NOTES
PHYSICAL THERAPY EVALUATION  Type of Visit: Evaluation       Fall Risk Screen:  Have you fallen 2 or more times in the past year?: No  Have you fallen and had an injury in the past year?: No  Is patient receiving Physical Therapy Services?: Yes  Fall screen comments: Pt not at risk for falls, 24/30 on FGA    Subjective  Per chart review, pt had ED visit 4/29/25 with A-fib and RVR with new onset of confusion and memory difficulties. Head CT results were chronic appearing right parietal infarct, no definite acute infarct or hemorrhage.     Today pt arrives with spouse, both of them note no improvement in cognition and memory, pt reports primarily short term memory affected. He notes his balance feels off and often has dizziness when standing up from a chair, this has been present for the last couple months. Pt had fall prior to going to [facility] after standing up from sitting down, brief LOC lasting only seconds. Pt notes being fired from [employer] job d/t cognition changes and difficulty learning new stefanie, goal is to return to work. Dizziness can present with pressure in the eyes and feeling off balance, rare light headedness. Denies room-spinning or dizziness with rolling in bed. Pt's spouse notes more shuffling feet while walking over the last few months.        Presenting condition or subjective complaint: Referred after hospital stay. heart beating fast, eye pain and headaches.  Date of onset: 05/02/25 (Referral date used)    Relevant medical history: Chest pain; Dizziness; Heart problems; High blood pressure; Overweight; Sleep disorder like apnea; Stroke; Thyroid problems   Past Medical History:   Diagnosis Date    Gout     Hyperlipidemia     Hypertension     Hypothyroidism     Kidney donor 1992     donated Left kidney to mother in 1992    Morbid (severe) obesity due to excess calories (H)     comorbid w/: Htn, Erectile dysfunction, Swelling of legs, Excessively sleep during the day, Awaken from sleep to  catch breath, Shortness of Breath with activity, Loud snoring, Stop breathing while asleep.     Thoracic aortic aneurysm without rupture     4.7 cm - s/p repair 2/2015     Dates & types of surgery:      Prior diagnostic imaging/testing results: CT scan; X-ray     Prior therapy history for the same diagnosis, illness or injury: No      Prior Level of Function  Transfers: Independent  Ambulation: Independent  ADL: Independent  IADL: Driving, Work    Living Environment  Social support: With family members   Type of home: House   Stairs to enter the home: No       Ramp: No   Stairs inside the home: Yes 12 Is there a railing: Yes     Help at home: None  Equipment owned: Crutches; Raised toilet seat     Employment: No    Hobbies/Interests:      Patient goals for therapy: Move around more without being winded.    Pain assessment: Location: R shoulder torn RC, both knees d/t gout/Rating: Not formally rated     Objective   Vitals Signs  Blood Pressure: 135/85 (After resting. 135/71 after standing 90 sec.)  Cognitive Status Examination  Orientation:    Level of Consciousness: Alert  Follows Commands and Answers Questions: 75% of the time, Difficulty following multi-step commands  Personal Safety and Judgement: Intact  Memory: Impaired, per pt report    OBSERVATION:   INTEGUMENTARY:   POSTURE:   PALPATION:   RANGE OF MOTION:   STRENGTH:     BED MOBILITY: WFL, per pt report    TRANSFERS: Independent    WHEELCHAIR MOBILITY:     GAIT:   Level of Hunt: WFL  Assistive Device(s): None  Gait Deviations: Stride length decreased  Nena decreased  Shuffling steps bilaterally  Gait Distance: 80 ft  Stairs: 4, able to complete without UE support reciprocally    BALANCE:     SPECIAL TESTS  Functional Gait Assessment (FGA) TOTAL SCORE: (MAXIMUM SCORE 30): 24 24/30   10 Meter Walk Test (Comfortable)  1.06 m/s   10 Meter Walk Test (Fast)  1.32 m/s   6 Minute Walk Test (6MWT)           Kincaid Balance Scale (BBS)     5 Times  "Sit-to-Stand (5TSTS)      Dynamic Gait Index (DGI)     Timed Up and Go (TUG) - sec    Single Leg Stance Right (sec)    Single Leg Stance Left (sec)    Modified CTSIB Conditions (sec) Cond 1: 30 sec   Cond 2:  30 sec  Cond 4:  30 sec  Cond 5 : 9 sec initially then opening eyes, 30 sec following vcues to keep eyes closed   Romberg  (sec)    Sharpened Romberg (sec)    30 Second Sit to Stand (reps/height)  6 reps from 18\", required frequent vcues to continue transfers      Mini-BESTest              SENSATION:  Not impaired per pt report    REFLEXES:   COORDINATION:   MUSCLE TONE:         Assessment & Plan   CLINICAL IMPRESSIONS  Medical Diagnosis: Cerebrovascular accident (CVA), unspecified mechanism (H)    Treatment Diagnosis: Dizziness and instability with reduced activity tolerance   Impression/Assessment: Patient is a 59 year old male with dizziness, balance, and worsened endurance complaints.  The following significant findings have been identified: Decreased strength, Impaired balance, Impaired gait, Decreased activity tolerance, Instability, Dizziness, and Disequilibrium . These impairments interfere with their ability to perform work tasks, recreational activities, household chores, household mobility, and community mobility as compared to previous level of function.     Pt presents with reduced activity tolerance, balance deficits, and dizziness with position changes. Balance deficits with head movements during walking and stepping over obstacles, pt's shuffling gait contributes to possible falls risk if walking on uneven surfaces. Vitals signs WFL throughout session today, negative orthostatic hypotension test ruling out as possible reason for positional dizziness. PT will benefit from skilled PT to improve activity tolerance, dynamic balance, and dizziness to help return pt to PLOF.    Clinical Decision Making (Complexity):  Clinical Presentation: Stable/Uncomplicated  Clinical Presentation Rationale: based " "on medical and personal factors listed in PT evaluation  Clinical Decision Making (Complexity): Low complexity    PLAN OF CARE  Treatment Interventions:  Interventions: Gait Training, Neuromuscular Re-education, Therapeutic Activity, Therapeutic Exercise, Self-Care/Home Management, Canalith Repositioning    Long Term Goals     PT Goal 1  Goal Identifier: HEP  Goal Description: Patient will show compliance with HEP by completing at least 3x per week for progression of balance and strength  Rationale: to maximize safety and independence with performance of ADLs and functional tasks;to maximize safety and independence within the community;to maximize safety and independence within the home  Target Date: 06/16/25  PT Goal 2  Goal Identifier: 30\" STS  Goal Description: Patient will complete 10 STS in 30\" from 18\" chair to show minimal detectable improvement in LE endurance  Rationale: to maximize safety and independence with performance of ADLs and functional tasks;to maximize safety and independence within the home;to maximize safety and independence within the community  Goal Progress: Eval: 6 reps  Target Date: 06/16/25  PT Goal 3  Goal Identifier: Dizziness  Goal Description: Patient will deny dizziness and eye pressure with position changes and head movements  Rationale: to maximize safety and independence with performance of ADLs and functional tasks;to maximize safety and independence within the home;to maximize safety and independence within the community  Goal Progress: Eval: dizziness with STS, eye pressure with walking head turns horizontal  Target Date: 06/16/25  PT Goal 4  Goal Identifier: Walking  Goal Description: Patient will walk 400 ft with heel strike >90% of the time and no AD for reduced falls risk with feet not dragging on ground and improved gait efficiency  Rationale: to maximize safety and independence with performance of ADLs and functional tasks;to maximize safety and independence within the " home;to maximize safety and independence within the community  Goal Progress: Eval: walk with shuffling feet throughout session, able to heel strike with vcue  Target Date: 06/16/25  PT Goal 5  Goal Identifier: Walking endurance  Goal Description: Patient will show >150 ft increase in gait distance during 6MWT to demo at least minimal detectable change in walking distance and activity tolerance  Rationale: to maximize safety and independence with performance of ADLs and functional tasks;to maximize safety and independence within the home;to maximize safety and independence within the community  Target Date: 06/16/25      Frequency of Treatment: 1x per week  Duration of Treatment: 6 weeks    Recommended Referrals to Other Professionals:   Education Assessment:   Learner/Method: Patient;Family;Listening;Reading;Pictures/Video  Education Comments: HEP, gait pattern. See above for details    Risks and benefits of evaluation/treatment have been explained.   Patient/Family/caregiver agrees with Plan of Care.     Evaluation Time:     PT Eval, Low Complexity Minutes (65696): 35       Signing Clinician: Darlin Jara PT

## 2025-05-06 ENCOUNTER — THERAPY VISIT (OUTPATIENT)
Dept: OCCUPATIONAL THERAPY | Facility: CLINIC | Age: 60
End: 2025-05-06
Attending: STUDENT IN AN ORGANIZED HEALTH CARE EDUCATION/TRAINING PROGRAM
Payer: COMMERCIAL

## 2025-05-06 DIAGNOSIS — I63.9 CEREBROVASCULAR ACCIDENT (CVA), UNSPECIFIED MECHANISM (H): Primary | ICD-10-CM

## 2025-05-06 PROCEDURE — 97165 OT EVAL LOW COMPLEX 30 MIN: CPT | Mod: GO

## 2025-05-06 NOTE — PROGRESS NOTES
"OCCUPATIONAL THERAPY EVALUATION  Type of Visit: Evaluation       Fall Risk Screen:  Have you fallen 2 or more times in the past year?: No  Have you fallen and had an injury in the past year?: No  Is patient receiving Physical Therapy Services?: Yes  Fall screen comments: Pt not at risk for falls, 24/30 on FGA    Subjective        Presenting condition or subjective complaint: Referred after hospital stay. heart beating fast, eye pain and headaches. - h/o stroke  Date of onset: 04/29/25 (date of hospitalization)    Relevant medical history: Chest pain; Dizziness; Heart problems; High blood pressure; Overweight; Sleep disorder like apnea; Stroke; Thyroid problems     Michael \"Juan\" is a pleasant 59 year old, right hand dominant male who presents to OP OT for further evaluation following recent stroke. Per OP PT eval 5/5/25:   Per chart review, pt had ED visit 4/29/25 with A-fib and RVR with new onset of confusion and memory difficulties. Head CT results were chronic appearing right parietal infarct, no definite acute infarct or hemorrhage.      Today pt arrives with spouse, both of them note no changes in cognition and memory, pt reports primarily short term memory affected. He notes his balance feels off and often has dizziness when standing up from a chair, this has been present for the last couple months. Pt had fall prior to going to [facility] after standing up from sitting down, brief LOC lasting only seconds. Pt notes being fired from [employer] job d/t cognition changes and difficulty learning new stefanie, goal is to return to work. Dizziness can present with pressure in the eyes and feeling off balance, rare light headedness. Denies room-spinning or dizziness with rolling in bed. Pt's spouse notes more shuffling feet while walking over the last few months.     Past Medical History:   Diagnosis Date    Gout     Hyperlipidemia     Hypertension     Hypothyroidism     Kidney donor 1992     donated Left " "kidney to mother in 1992    Morbid (severe) obesity due to excess calories (H)     comorbid w/: Htn, Erectile dysfunction, Swelling of legs, Excessively sleep during the day, Awaken from sleep to catch breath, Shortness of Breath with activity, Loud snoring, Stop breathing while asleep.     Thoracic aortic aneurysm without rupture     4.7 cm - s/p repair 2/2015     Dates & types of surgery:    Past Surgical History:   Procedure Laterality Date    ARTHROSCOPY KNEE Right 2005     COLONOSCOPY N/A 1/24/2017    Procedure: COLONOSCOPY;  Surgeon: Maikol Faulkner MD;  Location: [facility]    REPAIR ANEURYSM ASCENDING AORTA N/A 2/17/2015    Procedure: REPAIR ANEURYSM ASCENDING AORTA;  Surgeon: Monae Lechuga MD;  Location: [facility]    REPAIR PATENT FORAMEN OVALE N/A 2/17/2015    Procedure: REPAIR PATENT FORAMEN OVALE;  Surgeon: Monae Lechuga MD;  Location: [facility] OR    s/p left nephrectomy - donor to mother  1992    TONSILLECTOMY  age 3      Prior diagnostic imaging/testing results: CT scan; X-ray  ; see above and EHR for details  Prior therapy history for the same diagnosis, illness or injury: No      Prior Level of Function  Transfers: Independent  Ambulation: Independent  ADL: Independent  IADL: Driving, Finances, Housekeeping, Meal preparation, Medication management, Work, Yard work    Living Environment  Social support: With family members (spouse, Dacia, and 21 year old son)  Type of home: House   Stairs to enter the home: No       Ramp: No   Stairs inside the home: Yes 12 Is there a railing: Yes     Help at home: None  Equipment owned: Crutches; Raised toilet seat     Employment: No   - used to work as a  (about 50-60 hours per week; was just laid off in the winter time due to cognitive changes; 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 Spouse, Dacia, reports he has been quieter and seems \"lost\" now that he is no longer in work as he derived a lot of " "his identity from work.  Hobbies/Interests:  fishing, sitting and relaxing either with the TV or outside    Patient goals for therapy: Move around more without being winded. Improve memory/thinking skills. Return to driving.    Pain assessment: Pain present     Objective     Cognitive Status Examination  Orientation: Oriented to person, place and time (oriented to generalized place with city correct, less oriented to specific place with unable to state correct location/clinic name on MoCA)  Level of Consciousness: Alert, Confused - responses are inappropriate to questions asked about 25% or less of the time  Follows Commands and Answers Questions: 100% of the time, Follows single step instructions, difficulty following multi-step instructions  Personal Safety and Judgement: At risk behaviors demonstrated - pt is generally aware of cognitive changes but appears to lack full insight into extent of cognitive changes and impact on safety/IND  Memory: Impaired  Attention: Selective attention impaired, difficulty ignoring irrelevant stimuli, Alternating attention impaired, difficulty shifting between tasks, Divided attention impaired, difficulty with simultaneous tasks, Difficulty with dual tasking   Organization/Problem Solving: Sequencing impaired, Problem solving impaired  Executive Function: Working memory impaired, decreased storage of information for performing tasks, Cognitive flexibility impaired, Planning ability impaired  Comments: Juan notes that he has increased forgetfulness, particularly with STM. Spouse, Dacia, gives example of Juan asking the same questions repetitively or asking things like \"it's Friday right?\" And it will be a Tuesday. Or will see his son leave for work then ask about an hour later if their son left for work. Feels LTM is relatively intact and easier to remember than STM. Denies difficulty with word-finding. Endorses needing increased cognitive processing time or can't finish his " thought process at all. Notes this leads to increased frustration, which then makes the task even harder. Regarding mental health, reports feeling a little more irritable/frustrated with himself as he isn't meeting his current expectations for himself.    Cognitive Screen: MoCA 8.1  Details: To further assess cognition, administered the [place] Cognitive Assessment (MoCA), which was designed as a rapid screening instrument for mild cognitive dysfunction with a score of 26/30 considered normal. Administered MoCA 8.1 with pt scoring 18/30 (MIS = 18/15), indicating potential mild cognitive impairment. Pt scored 1/5 on visuospatial/executive (gets contour of clock only), 3/3 on naming, 6/6 on attention, 1/3 on language (error with second sentence repeat, gets 5 words in 1  min with fluency), 2/2 on abstraction, 0/5 on delayed recall (recalls 2 words with category cue, recognizes 3 words with multiple choice cues), and 5/6 on orientation (error with place for name of clinic).     VISUAL SKILLS  Visual Acuity: Wears glasses (reading glasses only; reports he gets pressure in his eyes, cloudiness in vision, and does feel dizzy if standing up too quickly; reports this has been happening a couple months ago and feels it is getting worse; mostly notices these symptoms when he stands too quickly; reports he's always been good about drinking plenty of water; reports his last eye exam was fall 2024)  Visual Field: Appears normal  Visual Attention: Appears normal  Oculomotor: Will further assess prn    SENSATION: UE Sensation WNL, LE Sensation WNL    POSTURE: WFL  RANGE OF MOTION: UE AROM WFL  STRENGTH: UE Strength WFL, LE Strength WFL; right-handed  MUSCLE TONE: WFL  COORDINATION: WFL  BALANCE: WFL    FUNCTIONAL MOBILITY  Assistive Device(s): None  Ambulation: IND  Wheelchair: n/a    BED MOBILITY: WFL, Independent    TRANSFERS: WFL, Independent    BATHING: WFL, Independent  Equipment: n/a    UPPER BODY DRESSING: WFL,  "Independent  Equipment: n/a    LOWER BODY DRESSING: WFL, Independent  Equipment: n/a    TOILETING: WFL, Independent  Equipment: n/a    GROOMING: WFL, Independent  Equipment: n/a    EATING/SELF FEEDING: WFL, Independent   Equipment: n/a    ACTIVITY TOLERANCE: Reports that his energy is about at his baseline. Reports h/o sleep apnea and regularly uses his CPAP machine. Reports he gets good sleep with CPAP. Sometimes sleeps in bed, sometimes in his recliner. Was not physically active prior to stroke. Just started OP PT yesterday and plans to start doing a little walking per PT's instructions.    INSTRUMENTAL ACTIVITIES OF DAILY LIVING (IADL):   Medication management: was managing IND previously but spouse has been assisting since memory troubles started; spouse, Dacia, sets up AM/PM pillbox for Juan; Juan is getting better at remembering to take medications but Dacia still reminds or directs as to what time to take them  Meal Planning/Prep: spouse, Dacia, is primary cook; can manage simple meals for breakfast/lunch as needed; does use stove and oven occasionally but denies forgetfulness with turning off (always sets a timer)  Home/Financial Management: Juan assists with cleaning dishes and yardwork (shared with spouse and 21 year old son). Spouse, Dacia, manages finances.   Communication/Computer Use: cell phone - reports increased difficulty with managing apps on the phone; had difficulty learning a new stefanie on his phone and couldn't comprehend and keep information in line from left to right on the screen, resulting in being laid off from job  Community Mobility: was IND with driving and drove a truck prior to hospitalization. Wasn't told he couldn't drive but hasn't driven since leaving the hospital. Regarding driving, he feels he sometimes gets turned around a little bit right now \"but nothing serious.\"  Care of Others: Has a dog and a cat.         Assessment & Plan   CLINICAL IMPRESSIONS  Medical Diagnosis: " Cerebrovascular accident (CVA), unspecified mechanism (H)    Treatment Diagnosis: Decreased safety and IND with ADLs/IADLs    Impression/Assessment: Pt is a 59 year old male presenting to Occupational Therapy due to stroke.  The following significant findings have been identified: Impaired activity tolerance, Impaired cognition, Impaired communication, Impaired mobility, Impaired safety/judgement, and Impaired visual perception.  These identified deficits interfere with their ability to perform work tasks, recreational activities, household chores, driving , household mobility, community mobility, medication management, financial management,  yard work, care of others, meal planning and preparation, and community or volunteer activities as compared to previous level of function.     Clinical Decision Making (Complexity):  Assessment of Occupational Performance: 5 or more Performance Deficits  Occupational Performance Limitations: functional mobility, care of pets, communication management, driving and community mobility, health management and maintenance, home establishment and management, meal preparation and cleanup, shopping, work, leisure activities, and social participation  Clinical Decision Making (Complexity): Low complexity    PLAN OF CARE  Treatment Interventions:  Interventions: Cognitive Skills, Community/Work Reintegration, Self-Care/Home Management, Therapeutic Activity, Therapeutic Exercise    Long Term Goals   OT Goal 1  Goal Identifier: Memory  Goal Description: Patient will demonstrate improved memory skills by completing short-term memory tasks in occupational therapy with 90% accuracy using compensatory strategies for increased safety and independence with ADL/IADLS (keeping up schedule, remembering to take medications, turn off the stove when done, remember to switch laundry).  Rationale: In order to maximize safety and independence with performance of self-care activities;In order to maximize  safety and independence with ADL/IADLs;In order to maximize safety and independence with cognitive function within the home or community;In order to maximize independence with tasks requiring functional cognition/executive function for school, work, medication or financial mgmt  Target Date: 08/03/25  OT Goal 2  Goal Identifier: Schedule/Financial Management  Goal Description: Pt will demonstrate the ability to complete moderate to complex problem-solving/planning tasks (WCPA, Errands, [employer], SET, etc.) with 85% accuracy to complete home and work tasks safely and accurately (e.g. meal preparation, financial management, medication management, driving, work).  Rationale: In order to maximize safety and independence with performance of self-care activities;In order to maximize safety and independence with ADL/IADLs;In order to maximize safety and independence with cognitive function within the home or community;In order to maximize independence with tasks requiring functional cognition/executive function for school, work, medication or financial mgmt  Target Date: 08/03/25  OT Goal 3  Goal Identifier: IADLs/Community Mobility  Goal Description: Pt will demonstrate visual and divided attention WFL for safe functional and community mobility tasks (navigating new environments, driving, grocery shopping) by scoring WFLs on 3/3 IADL tasks (Dynavision, Scan course, SDMT, trail-making, BiVABA scan sheets, etc.).  Rationale: In order to maximize safety and independence with performance of self-care activities;In order to maximize safety and independence with ADL/IADLs;In order to maximize safety and independence with cognitive function within the home or community;In order to maximize safety and independence with functional transfers and functional mobility within the home or community  Target Date: 08/03/25  OT Goal 4  Goal Identifier: Compensatory strategies  Goal Description: Patient to verbalize understanding of and  demonstrate use of 3 new pieces of adapted/compensatory strategies to increase independence and safety with ADL/IADLs (memory, schedule management, work, sleep).  Rationale: In order to maximize safety and independence with performance of self-care activities;In order to maximize safety and independence with ADL/IADLs;In order to safely and appropriately apply compensatory strategies with ADL/IADL performance  Target Date: 08/03/25      Frequency of Treatment: 1x/week  Duration of Treatment: up to 90 days     Recommended Referrals to Other Professionals: Physical Therapy (already scheduled), Speech Language Pathology (will monitor needs and initiate referral prn)  Education Assessment: Learner/Method: Patient;Listening  Education Comments: OT evaluation completed, educated in OT role and POC. Briefly educated in cognitive fatigue following stroke and cognitive HEP with examples of range of activities for home. Issued cognitive apps list to initiate cognitive HEP and encouraged regular physical exercise per recommendations provided by OP PT (per pt report). Will further progress in future sessions.     Risks and benefits of evaluation/treatment have been explained.   Patient/Family/caregiver agrees with Plan of Care.     Evaluation Time:    OT Eval, Low Complexity Minutes (49000): 45       Signing Clinician: DOC De La Fuente

## 2025-05-07 ENCOUNTER — PATIENT OUTREACH (OUTPATIENT)
Dept: CARE COORDINATION | Facility: CLINIC | Age: 60
End: 2025-05-07
Payer: COMMERCIAL

## 2025-05-12 ENCOUNTER — THERAPY VISIT (OUTPATIENT)
Dept: PHYSICAL THERAPY | Facility: CLINIC | Age: 60
End: 2025-05-12
Attending: STUDENT IN AN ORGANIZED HEALTH CARE EDUCATION/TRAINING PROGRAM
Payer: COMMERCIAL

## 2025-05-12 DIAGNOSIS — I63.9 CEREBROVASCULAR ACCIDENT (CVA), UNSPECIFIED MECHANISM (H): Primary | ICD-10-CM

## 2025-05-12 PROCEDURE — 97112 NEUROMUSCULAR REEDUCATION: CPT | Mod: GP | Performed by: PHYSICAL THERAPIST

## 2025-05-12 PROCEDURE — 97110 THERAPEUTIC EXERCISES: CPT | Mod: GP | Performed by: PHYSICAL THERAPIST

## 2025-05-19 ENCOUNTER — THERAPY VISIT (OUTPATIENT)
Dept: PHYSICAL THERAPY | Facility: CLINIC | Age: 60
End: 2025-05-19
Attending: STUDENT IN AN ORGANIZED HEALTH CARE EDUCATION/TRAINING PROGRAM
Payer: COMMERCIAL

## 2025-05-19 DIAGNOSIS — I63.9 CEREBROVASCULAR ACCIDENT (CVA), UNSPECIFIED MECHANISM (H): Primary | ICD-10-CM

## 2025-05-19 PROCEDURE — 97110 THERAPEUTIC EXERCISES: CPT | Mod: GP | Performed by: PHYSICAL THERAPIST

## 2025-05-19 PROCEDURE — 97750 PHYSICAL PERFORMANCE TEST: CPT | Mod: GP | Performed by: PHYSICAL THERAPIST

## 2025-05-20 ENCOUNTER — TELEPHONE (OUTPATIENT)
Dept: CARDIOLOGY | Facility: CLINIC | Age: 60
End: 2025-05-20
Payer: COMMERCIAL

## 2025-05-20 NOTE — TELEPHONE ENCOUNTER
1st attempt - Phone - PT requested to schedule  at alter date. The patient to call back and schedule the following:    Appointment type:  New EP  Provider:  Dr Cooney, Dr Weinstein, Dr Fernandez  Return date:  FA  Additional appointment(s) needed:  NA  Additional Notes:    Called PT to reschedule and appointment that was incorrectly scheduled. PT is aware appt on 5/27/25 was canceled. When asked if the PT wanted to rescheduled with an MD since it has been 5+ years since he's seen a physician at our clinic the PT declined and said they would call back at a later date.     Specialty phone number: [phone]

## 2025-05-20 NOTE — TELEPHONE ENCOUNTER
[facility] Call Center    Phone Message    May a detailed message be left on voicemail: yes     Reason for Call: Other: Patient wife called to make F/U appt. I scheduled patient first available in time frame wife was looking for. After call I realized it was a hospital F/U. Please review if appt is ok or not. Thank you      Action Taken: Other: cardiology     Travel Screening: Not Applicable    Thank you!  Specialty Access Center       Date of Service:

## 2025-05-20 NOTE — TELEPHONE ENCOUNTER
[facility] Call Center    Phone Message    May a detailed message be left on voicemail: yes     Reason for Call: Other: Spouse, Dacia called clinic back and rescheduled to next available 7/17/25 with Kavin. Dacia is requesting care team to review as she was under the impression that the pt needed a sooner appt as this was a hospital follow up. Please review, otherwise Dacia is aware of the 7/17 appt.      Ok with either [place] or [place] locations.     Action Taken: Message routed to:  Other: [facility] [place] and [facility] HEART SCHEDULING [[id number]]    Travel Screening: Not Applicable     Date of Service:

## 2025-05-27 NOTE — TELEPHONE ENCOUNTER
Rx appropriate for refill.   rx declined.    No future appts with primary care in [facility]. Last appt was 1/2020 for physical.   Per last refills on meds 3/21/2021 and 12/23/2020 - pt was notified of need for appts for physical with med check and labs.  None of those have been done.   Cannot refill.  Please inform patient.   Last labs were from    [facility]      Specimen Collected: 04/01/20      Creatinine 1.210  AST = 19  ALT = 33    Pt may call them for refill on the allopurinol if they deem it is still necessary.      Please assist pt in making appt for a physical and med check, please, here.

## 2025-05-29 ENCOUNTER — APPOINTMENT (OUTPATIENT)
Dept: GENERAL RADIOLOGY | Facility: CLINIC | Age: 60
End: 2025-05-29
Attending: EMERGENCY MEDICINE
Payer: COMMERCIAL

## 2025-05-29 ENCOUNTER — APPOINTMENT (OUTPATIENT)
Dept: CT IMAGING | Facility: CLINIC | Age: 60
End: 2025-05-29
Attending: EMERGENCY MEDICINE
Payer: COMMERCIAL

## 2025-05-29 ENCOUNTER — HOSPITAL ENCOUNTER (OUTPATIENT)
Facility: CLINIC | Age: 60
Setting detail: OBSERVATION
End: 2025-05-29
Attending: EMERGENCY MEDICINE | Admitting: STUDENT IN AN ORGANIZED HEALTH CARE EDUCATION/TRAINING PROGRAM
Payer: COMMERCIAL

## 2025-05-29 VITALS
RESPIRATION RATE: 18 BRPM | HEART RATE: 89 BPM | OXYGEN SATURATION: 97 % | SYSTOLIC BLOOD PRESSURE: 125 MMHG | BODY MASS INDEX: 42.66 KG/M2 | TEMPERATURE: 97.9 F | HEIGHT: 72 IN | DIASTOLIC BLOOD PRESSURE: 83 MMHG | WEIGHT: 315 LBS

## 2025-05-29 DIAGNOSIS — I63.9 CEREBROVASCULAR ACCIDENT (CVA), UNSPECIFIED MECHANISM (H): ICD-10-CM

## 2025-05-29 DIAGNOSIS — I48.91 ATRIAL FIBRILLATION WITH RVR (H): ICD-10-CM

## 2025-05-29 DIAGNOSIS — I95.1 ORTHOSTATIC SYNCOPE: ICD-10-CM

## 2025-05-29 DIAGNOSIS — R42 ORTHOSTATIC LIGHTHEADEDNESS: ICD-10-CM

## 2025-05-29 DIAGNOSIS — R55 SYNCOPE, UNSPECIFIED SYNCOPE TYPE: Primary | ICD-10-CM

## 2025-05-29 LAB
ANION GAP SERPL CALCULATED.3IONS-SCNC: 11 MMOL/L (ref 7–15)
ATRIAL RATE - MUSE: 88 BPM
BASE EXCESS BLDV CALC-SCNC: 4 MMOL/L (ref -3–3)
BASOPHILS # BLD AUTO: 0 10E3/UL (ref 0–0.2)
BASOPHILS NFR BLD AUTO: 1 %
BUN SERPL-MCNC: 23.2 MG/DL (ref 8–23)
CALCIUM SERPL-MCNC: 9.8 MG/DL (ref 8.8–10.4)
CHLORIDE SERPL-SCNC: 102 MMOL/L (ref 98–107)
CREAT SERPL-MCNC: 1.83 MG/DL (ref 0.67–1.17)
DIASTOLIC BLOOD PRESSURE - MUSE: NORMAL MMHG
DIGOXIN SERPL-MCNC: <0.4 NG/ML (ref 0.6–1.2)
EGFRCR SERPLBLD CKD-EPI 2021: 42 ML/MIN/1.73M2
EOSINOPHIL # BLD AUTO: 0.1 10E3/UL (ref 0–0.7)
EOSINOPHIL NFR BLD AUTO: 2 %
ERYTHROCYTE [DISTWIDTH] IN BLOOD BY AUTOMATED COUNT: 13.2 % (ref 10–15)
GLUCOSE SERPL-MCNC: 120 MG/DL (ref 70–99)
HCO3 BLDV-SCNC: 32 MMOL/L (ref 21–28)
HCO3 SERPL-SCNC: 29 MMOL/L (ref 22–29)
HCT VFR BLD AUTO: 49.4 % (ref 40–53)
HGB BLD-MCNC: 15.4 G/DL (ref 13.3–17.7)
HOLD SPECIMEN: NORMAL
IMM GRANULOCYTES # BLD: 0.1 10E3/UL
IMM GRANULOCYTES NFR BLD: 1 %
INTERPRETATION ECG - MUSE: NORMAL
LACTATE BLD-SCNC: 2 MMOL/L (ref 0.7–2)
LYMPHOCYTES # BLD AUTO: 2.1 10E3/UL (ref 0.8–5.3)
LYMPHOCYTES NFR BLD AUTO: 24 %
MCH RBC QN AUTO: 29.1 PG (ref 26.5–33)
MCHC RBC AUTO-ENTMCNC: 31.2 G/DL (ref 31.5–36.5)
MCV RBC AUTO: 93 FL (ref 78–100)
MONOCYTES # BLD AUTO: 1 10E3/UL (ref 0–1.3)
MONOCYTES NFR BLD AUTO: 11 %
NEUTROPHILS # BLD AUTO: 5.6 10E3/UL (ref 1.6–8.3)
NEUTROPHILS NFR BLD AUTO: 63 %
NRBC # BLD AUTO: 0 10E3/UL
NRBC BLD AUTO-RTO: 0 /100
NT-PROBNP SERPL-MCNC: 816 PG/ML (ref 0–177)
P AXIS - MUSE: NORMAL DEGREES
PCO2 BLDV: 57 MM HG (ref 40–50)
PH BLDV: 7.36 [PH] (ref 7.32–7.43)
PLATELET # BLD AUTO: 250 10E3/UL (ref 150–450)
PO2 BLDV: 22 MM HG (ref 25–47)
POTASSIUM SERPL-SCNC: 4.9 MMOL/L (ref 3.4–5.3)
PR INTERVAL - MUSE: 244 MS
QRS DURATION - MUSE: 88 MS
QT - MUSE: 332 MS
QTC - MUSE: 401 MS
R AXIS - MUSE: 64 DEGREES
RBC # BLD AUTO: 5.3 10E6/UL (ref 4.4–5.9)
SAO2 % BLDV: 34 % (ref 70–75)
SODIUM SERPL-SCNC: 142 MMOL/L (ref 135–145)
SYSTOLIC BLOOD PRESSURE - MUSE: NORMAL MMHG
T AXIS - MUSE: 54 DEGREES
TROPONIN T SERPL HS-MCNC: 10 NG/L
TROPONIN T SERPL HS-MCNC: 13 NG/L
TSH SERPL DL<=0.005 MIU/L-ACNC: 1.33 UIU/ML (ref 0.3–4.2)
VENTRICULAR RATE- MUSE: 88 BPM
WBC # BLD AUTO: 8.9 10E3/UL (ref 4–11)

## 2025-05-29 PROCEDURE — 70450 CT HEAD/BRAIN W/O DYE: CPT

## 2025-05-29 PROCEDURE — 93005 ELECTROCARDIOGRAM TRACING: CPT

## 2025-05-29 PROCEDURE — 96360 HYDRATION IV INFUSION INIT: CPT

## 2025-05-29 PROCEDURE — G0378 HOSPITAL OBSERVATION PER HR: HCPCS

## 2025-05-29 PROCEDURE — 82803 BLOOD GASES ANY COMBINATION: CPT

## 2025-05-29 PROCEDURE — 84484 ASSAY OF TROPONIN QUANT: CPT | Performed by: EMERGENCY MEDICINE

## 2025-05-29 PROCEDURE — 80162 ASSAY OF DIGOXIN TOTAL: CPT | Performed by: EMERGENCY MEDICINE

## 2025-05-29 PROCEDURE — 258N000003 HC RX IP 258 OP 636: Performed by: EMERGENCY MEDICINE

## 2025-05-29 PROCEDURE — 99223 1ST HOSP IP/OBS HIGH 75: CPT | Performed by: PHYSICIAN ASSISTANT

## 2025-05-29 PROCEDURE — 250N000013 HC RX MED GY IP 250 OP 250 PS 637: Performed by: PHYSICIAN ASSISTANT

## 2025-05-29 PROCEDURE — 99291 CRITICAL CARE FIRST HOUR: CPT | Mod: 25

## 2025-05-29 PROCEDURE — 93005 ELECTROCARDIOGRAM TRACING: CPT | Mod: 76

## 2025-05-29 PROCEDURE — 80048 BASIC METABOLIC PNL TOTAL CA: CPT | Performed by: EMERGENCY MEDICINE

## 2025-05-29 PROCEDURE — 84443 ASSAY THYROID STIM HORMONE: CPT | Performed by: EMERGENCY MEDICINE

## 2025-05-29 PROCEDURE — 71046 X-RAY EXAM CHEST 2 VIEWS: CPT

## 2025-05-29 PROCEDURE — 85041 AUTOMATED RBC COUNT: CPT | Performed by: EMERGENCY MEDICINE

## 2025-05-29 PROCEDURE — 96361 HYDRATE IV INFUSION ADD-ON: CPT

## 2025-05-29 PROCEDURE — 36415 COLL VENOUS BLD VENIPUNCTURE: CPT | Performed by: EMERGENCY MEDICINE

## 2025-05-29 PROCEDURE — 83880 ASSAY OF NATRIURETIC PEPTIDE: CPT | Performed by: EMERGENCY MEDICINE

## 2025-05-29 PROCEDURE — 99292 CRITICAL CARE ADDL 30 MIN: CPT

## 2025-05-29 RX ORDER — LIDOCAINE 40 MG/G
CREAM TOPICAL
Status: DISCONTINUED | OUTPATIENT
Start: 2025-05-29 | End: 2025-05-30 | Stop reason: HOSPADM

## 2025-05-29 RX ORDER — LISINOPRIL 10 MG/1
20 TABLET ORAL DAILY
Status: DISCONTINUED | OUTPATIENT
Start: 2025-05-30 | End: 2025-05-30 | Stop reason: HOSPADM

## 2025-05-29 RX ORDER — HYDRALAZINE HYDROCHLORIDE 20 MG/ML
10 INJECTION INTRAMUSCULAR; INTRAVENOUS EVERY 4 HOURS PRN
Status: DISCONTINUED | OUTPATIENT
Start: 2025-05-29 | End: 2025-05-30 | Stop reason: HOSPADM

## 2025-05-29 RX ORDER — ACETAMINOPHEN 650 MG/1
650 SUPPOSITORY RECTAL EVERY 4 HOURS PRN
Status: DISCONTINUED | OUTPATIENT
Start: 2025-05-29 | End: 2025-05-30 | Stop reason: HOSPADM

## 2025-05-29 RX ORDER — AMOXICILLIN 250 MG
1 CAPSULE ORAL 2 TIMES DAILY
Status: DISCONTINUED | OUTPATIENT
Start: 2025-05-29 | End: 2025-05-30 | Stop reason: HOSPADM

## 2025-05-29 RX ORDER — AMOXICILLIN 250 MG
1 CAPSULE ORAL 2 TIMES DAILY PRN
Status: DISCONTINUED | OUTPATIENT
Start: 2025-05-29 | End: 2025-05-30 | Stop reason: HOSPADM

## 2025-05-29 RX ORDER — DIGOXIN 125 MCG
125 TABLET ORAL DAILY
Status: DISCONTINUED | OUTPATIENT
Start: 2025-05-30 | End: 2025-05-30 | Stop reason: HOSPADM

## 2025-05-29 RX ORDER — ATORVASTATIN CALCIUM 40 MG/1
40 TABLET, FILM COATED ORAL DAILY
COMMUNITY
End: 2025-05-29

## 2025-05-29 RX ORDER — OXYCODONE HYDROCHLORIDE 5 MG/1
5 TABLET ORAL EVERY 4 HOURS PRN
Status: DISCONTINUED | OUTPATIENT
Start: 2025-05-29 | End: 2025-05-30 | Stop reason: HOSPADM

## 2025-05-29 RX ORDER — POLYETHYLENE GLYCOL 3350 17 G/17G
17 POWDER, FOR SOLUTION ORAL 2 TIMES DAILY PRN
Status: DISCONTINUED | OUTPATIENT
Start: 2025-05-29 | End: 2025-05-30 | Stop reason: HOSPADM

## 2025-05-29 RX ORDER — AMOXICILLIN 250 MG
2 CAPSULE ORAL 2 TIMES DAILY PRN
Status: DISCONTINUED | OUTPATIENT
Start: 2025-05-29 | End: 2025-05-30 | Stop reason: HOSPADM

## 2025-05-29 RX ORDER — AMOXICILLIN 250 MG
2 CAPSULE ORAL 2 TIMES DAILY
Status: DISCONTINUED | OUTPATIENT
Start: 2025-05-29 | End: 2025-05-30 | Stop reason: HOSPADM

## 2025-05-29 RX ORDER — LEVOTHYROXINE SODIUM 75 UG/1
75 TABLET ORAL
Status: DISCONTINUED | OUTPATIENT
Start: 2025-05-30 | End: 2025-05-30 | Stop reason: HOSPADM

## 2025-05-29 RX ORDER — HYDROMORPHONE HCL IN WATER/PF 6 MG/30 ML
0.4 PATIENT CONTROLLED ANALGESIA SYRINGE INTRAVENOUS
Status: DISCONTINUED | OUTPATIENT
Start: 2025-05-29 | End: 2025-05-30 | Stop reason: HOSPADM

## 2025-05-29 RX ORDER — CARVEDILOL 25 MG/1
25 TABLET ORAL 2 TIMES DAILY WITH MEALS
Status: DISCONTINUED | OUTPATIENT
Start: 2025-05-29 | End: 2025-05-30 | Stop reason: HOSPADM

## 2025-05-29 RX ORDER — HYDROMORPHONE HCL IN WATER/PF 6 MG/30 ML
0.2 PATIENT CONTROLLED ANALGESIA SYRINGE INTRAVENOUS
Status: DISCONTINUED | OUTPATIENT
Start: 2025-05-29 | End: 2025-05-30 | Stop reason: HOSPADM

## 2025-05-29 RX ORDER — ONDANSETRON 4 MG/1
4 TABLET, ORALLY DISINTEGRATING ORAL EVERY 6 HOURS PRN
Status: DISCONTINUED | OUTPATIENT
Start: 2025-05-29 | End: 2025-05-30 | Stop reason: HOSPADM

## 2025-05-29 RX ORDER — PROCHLORPERAZINE MALEATE 10 MG
10 TABLET ORAL EVERY 6 HOURS PRN
Status: DISCONTINUED | OUTPATIENT
Start: 2025-05-29 | End: 2025-05-30 | Stop reason: HOSPADM

## 2025-05-29 RX ORDER — BISACODYL 10 MG
10 SUPPOSITORY, RECTAL RECTAL DAILY PRN
Status: DISCONTINUED | OUTPATIENT
Start: 2025-05-29 | End: 2025-05-30 | Stop reason: HOSPADM

## 2025-05-29 RX ORDER — ONDANSETRON 2 MG/ML
4 INJECTION INTRAMUSCULAR; INTRAVENOUS EVERY 6 HOURS PRN
Status: DISCONTINUED | OUTPATIENT
Start: 2025-05-29 | End: 2025-05-30 | Stop reason: HOSPADM

## 2025-05-29 RX ORDER — ALLOPURINOL 300 MG/1
300 TABLET ORAL DAILY
Status: DISCONTINUED | OUTPATIENT
Start: 2025-05-30 | End: 2025-05-30 | Stop reason: HOSPADM

## 2025-05-29 RX ORDER — HYDRALAZINE HYDROCHLORIDE 10 MG/1
10 TABLET, FILM COATED ORAL EVERY 4 HOURS PRN
Status: DISCONTINUED | OUTPATIENT
Start: 2025-05-29 | End: 2025-05-30 | Stop reason: HOSPADM

## 2025-05-29 RX ORDER — ACETAMINOPHEN 325 MG/1
650 TABLET ORAL EVERY 4 HOURS PRN
Status: DISCONTINUED | OUTPATIENT
Start: 2025-05-29 | End: 2025-05-30 | Stop reason: HOSPADM

## 2025-05-29 RX ORDER — METOPROLOL TARTRATE 1 MG/ML
2.5 INJECTION, SOLUTION INTRAVENOUS EVERY 5 MIN PRN
Status: DISCONTINUED | OUTPATIENT
Start: 2025-05-29 | End: 2025-05-30 | Stop reason: HOSPADM

## 2025-05-29 RX ADMIN — SENNOSIDES AND DOCUSATE SODIUM 1 TABLET: 50; 8.6 TABLET ORAL at 21:40

## 2025-05-29 RX ADMIN — APIXABAN 5 MG: 5 TABLET, FILM COATED ORAL at 21:40

## 2025-05-29 RX ADMIN — SODIUM CHLORIDE 500 ML: 0.9 INJECTION, SOLUTION INTRAVENOUS at 18:30

## 2025-05-29 RX ADMIN — CARVEDILOL 25 MG: 25 TABLET, FILM COATED ORAL at 21:40

## 2025-05-29 ASSESSMENT — COLUMBIA-SUICIDE SEVERITY RATING SCALE - C-SSRS
2. HAVE YOU ACTUALLY HAD ANY THOUGHTS OF KILLING YOURSELF IN THE PAST MONTH?: NO
6. HAVE YOU EVER DONE ANYTHING, STARTED TO DO ANYTHING, OR PREPARED TO DO ANYTHING TO END YOUR LIFE?: NO
1. IN THE PAST MONTH, HAVE YOU WISHED YOU WERE DEAD OR WISHED YOU COULD GO TO SLEEP AND NOT WAKE UP?: NO

## 2025-05-29 ASSESSMENT — ACTIVITIES OF DAILY LIVING (ADL)
ADLS_ACUITY_SCORE: 47
ADLS_ACUITY_SCORE: 47
ADLS_ACUITY_SCORE: 30
ADLS_ACUITY_SCORE: 47

## 2025-05-29 ASSESSMENT — VISUAL ACUITY
OS: 20/30;WITHOUT CORRECTIVE LENSES
OD: 20/40;WITHOUT CORRECTIVE LENSES

## 2025-05-29 NOTE — ED PROVIDER NOTES
Emergency Department Note      History of Present Illness     Chief Complaint   Dizziness and Eye Problem    HPI   Michael Esteban is a 59 year old male, anticoagulated with Eliquis, with a history of atrial fibrillation with RVR, ascending aortic aneurysm, and hypertension, who presents to the ED with his daughter for evaluation of dizziness and an eye problem. Michael's wife reports that starting on 05/23 he became dizzy, lightheaded, developed blurry vision, and wobbly knees. He had multiple falls beginning 05/24, the falls happened during periods of increased dizziness and wobbling of his knees. He denies neck pain, extremity pain, black/bloody stools, dysuria, leg edema, or shortness of breath while laying flat. Michael had a stroke about one month prior, he has residual memory defects, and was diagnosed with atrial flutter about one month prior. He has been eating and drinking fluids as normal. Of note, Michael recently stopped drinking alcohol.    Independent Historian   Wife as detailed above.    Review of External Notes   I reviewed the patient's hospital admission from 04/29/2025, he was admitted after a CVA.    Past Medical History     Medical History and Problem List   Ascending aortic aneurysm   Atrial fibrillation with RVR   Arthralgia of lower leg   AMAYA   Confusion   Cellulitis   CVA   Erectile dysfunction   Gout  Hyperlipidemia  Hypertension  Hypothyroidism  Kidney donor  Morbid obesity   Obstructive sleep apnea   Syncope   Tobacco use disorder   Thoracic aortic aneurysm without rupture  Umbilical hernia   Vertigo     Medications   Coreg   Eliquis   Lanoxin   Synthroid   Viagra   Zyloprim   Zestril     Surgical History   Arthroscopy of knee  Colonoscopy   Repair aneurysm ascending aorta   Repair patent foramen ovale   Left nephrectomy   Tonsillectomy   Gerlach teeth extraction     Physical Exam     Patient Vitals for the past 24 hrs:   BP Temp Temp src Pulse Resp SpO2 Height Weight    05/29/25 1620 129/83 -- -- 89 17 94 % -- --   05/29/25 1615 (!) 137/96 -- -- 88 19 (!) 91 % -- --   05/29/25 1556 (!) 50/42 97.3  F (36.3  C) Temporal 87 16 98 % 1.829 m (6') (!) 140.6 kg (310 lb)     Physical Exam  Constitutional: Well developed, somewhat ill, nontox appearance  Head: Atraumatic.   Mouth/Throat: Oropharynx is clear and moist.   Neck:  no stridor, no C-spine tenderness  Eyes: no scleral icterus  Cardiovascular: Irregularly irregular rate and rhythm, 2+ bilat radial pulses  Pulmonary/Chest: nml resp effort, Clear BS bilat  Abdominal: ND, soft, NT, no rebound or guarding   Ext: Warm, well perfused, no edema  Neurological: A&O, symmetric facies, moves ext x4  Skin: Skin is warm and dry.   Psychiatric: Behavior is normal. Thought content normal.   Nursing note and vitals reviewed.      Diagnostics     Lab Results   Labs Ordered and Resulted from Time of ED Arrival to Time of ED Departure   BASIC METABOLIC PANEL - Abnormal       Result Value    Sodium 142      Potassium 4.9      Chloride 102      Carbon Dioxide (CO2) 29      Anion Gap 11      Urea Nitrogen 23.2 (*)     Creatinine 1.83 (*)     GFR Estimate 42 (*)     Calcium 9.8      Glucose 120 (*)    NT-PROBNP - Abnormal    NT-proBNP 816 (*)    ISTAT GASES LACTATE VENOUS POCT - Abnormal    Lactic Acid POCT 2.0      Bicarbonate Venous POCT 32 (*)     O2 Sat, Venous POCT 34 (*)     pCO2 Venous POCT 57 (*)     pH Venous POCT 7.36      pO2 Venous POCT 22 (*)     Base Excess/Deficit (+/-) POCT 4.0 (*)    CBC WITH PLATELETS AND DIFFERENTIAL - Abnormal    WBC Count 8.9      RBC Count 5.30      Hemoglobin 15.4      Hematocrit 49.4      MCV 93      MCH 29.1      MCHC 31.2 (*)     RDW 13.2      Platelet Count 250      % Neutrophils 63      % Lymphocytes 24      % Monocytes 11      % Eosinophils 2      % Basophils 1      % Immature Granulocytes 1      NRBCs per 100 WBC 0      Absolute Neutrophils 5.6      Absolute Lymphocytes 2.1      Absolute Monocytes 1.0       Absolute Eosinophils 0.1      Absolute Basophils 0.0      Absolute Immature Granulocytes 0.1      Absolute NRBCs 0.0     DIGOXIN LEVEL - Abnormal    Digoxin <0.4 (*)    TROPONIN T, HIGH SENSITIVITY - Normal    Troponin T, High Sensitivity 13     TSH WITH FREE T4 REFLEX - Normal    TSH 1.33     TROPONIN T, HIGH SENSITIVITY - Normal    Troponin T, High Sensitivity 10       Imaging   XR Chest 2 Views   Final Result   IMPRESSION: Sternotomy. No acute findings. The lungs are clear and there are no pleural effusions. Normal heart size.      CT Head w/o Contrast   Final Result   IMPRESSION:   1.  No CT evidence for acute intracranial process.   2.  Brain atrophy and presumed chronic microvascular ischemic changes as above.           EKG   ECG taken at 1606, ECG read at 1610  *Critical Test Results: High Heart rate   ST & T wave abnormality, consider inferior ischemia   ST & T wave abnormality, consider anterolateral ischemia.   Supraventricular tachycardia replaced atrial flutter with 2:1 block as compared to prior, dated 04/29/2025.  Rate 174 bpm. MD interval * ms. QRS duration 76 ms. QT/QTc 270/459 ms. P-R-T axes * 79 -23.    EKG   ECG taken at 1617, ECG read at 1617  *Critical Test Result: AV block   Atrial fibrillation   Nonspecific ST abnormality    Atrial fibrillation as compared to prior, dated 05/29/2025.  Rate 94 bpm. MD interval 222 ms. QRS duration 90 ms. QT/QTc 310/387 ms. P-R-T axes * 63 59.    EKG  ECG taken at 1842, ECG read at 1844  Sinus rhythm with 1st degree AV block   Nonspecific ST abnormality    Resolved atrial fibrillation with RVR as compared to prior, dated 05/29/2025.  Rate 88 bpm. MD interval 244 ms. QRS duration 88 ms. QT/QTc 332/401 ms. P-R-T axes * 64 54.     Independent Interpretation   CXR: No pneumothorax.  CT Head: No intracranial hemorrhage.    ED Course      Medications Administered   Medications   sodium chloride 0.9% BOLUS 500 mL (500 mLs Intravenous $New Bag 5/29/25 0470)      Procedures   Procedures     Discussion of Management   Admitting Hospitalist, Jenny Knutson PA-C admitting for Dr. Bryan.    ED Course   ED Course as of 05/29/25 1855   Thu May 29, 2025   1610 I obtained history and examined the patient as noted above.    1848 I spoke to hospitalist Jenny Knutson PA-C admitting for Dr. Bryan regarding the patient's previous ED visit, presentation to the ED today, findings of my exams, and the results of his labs and scans.     Additional Documentation  The patient recently stopped drinking, he still smokes.    Medical Decision Making / Diagnosis     CMS Diagnoses: None    MIPS   None       MDM   Michael Esteban is a 59 year old male presenting with lightheadedness, tachycardia, multiple falls    Differential diagnosis includes orthostatic syncope, intracranial hemorrhage, lecture abnormality, atrial fibrillation with RVR, SVT, dehydration, electrolyte abnormality.  Initial EKG is findings concerning for SVT versus atrial flutter with RVR.  The patient converted to atrial fibrillation, intermittently rate controlled with moving from the wheelchair to the gurney.  Labs significant for AMAYA, troponin within normal limits, CBC within normal limits.  Patient's symptoms seem to be somewhat orthostatic in nature which would correlate with his AMAYA.  Patient was given a small fluid bolus in the emergency department given concern for possible iatrogenic volume overload/CHF given his history of atrial flutter/fibrillation and heart rates.  Given his AMAYA, solitary kidney, multiple falls, I think it be appropriate to admit the patient to the hospitalist service for observation.  CT head negative for intracranial hemorrhage.  Chest x-ray negative for pneumothorax, other signs of trauma or evidence of CHF.  The patient is wife counseled the results, diagnosis and disposition.  They are understanding agreeable to plan.  The patient was admitted in guarded condition.    Disposition   The  patient was admitted to the hospital.     Diagnosis     ICD-10-CM    1. Orthostatic syncope  I95.1       2. Orthostatic lightheadedness  R42       3. Atrial fibrillation with RVR (H)  I48.91          Scribe Disclosure:  Bety SIERRA, am serving as a scribe at 4:26 PM on 5/29/2025 to document services personally performed by Michael Shabazz MD based on my observations and the provider's statements to me.        Michael Shabazz MD  05/29/25 1908

## 2025-05-29 NOTE — ED NOTES
[facility]  ED Nurse Handoff Report    ED Chief complaint: Dizziness and Eye Problem      ED Diagnosis:   Final diagnoses:   None       Code Status: see admitting MD    Allergies:   Allergies   Allergen Reactions    Thalidomide      Reaction during stress test         Patient Story: Patient here with wife for dizziness, blurry vision started on Friday.     Focused Assessment:  Wife reports patient had CVA one month ago and short term memory issues as residual.     Treatments and/or interventions provided: labs, CT head, XR chest, EKG  Patient's response to treatments and/or interventions: good    To be done/followed up on inpatient unit:  see orders    Does this patient have any cognitive concerns?: none    Activity level - Baseline/Home:  Independent  Activity Level - Current:   Stand with assist x2    Patient's Preferred language: English   Needed?: No    Isolation: None  Infection: Not Applicable  Sepsis treatment initiated: No  Patient tested for COVID 19 prior to admission: NO  Bariatric?: No    Vital Signs:   Vitals:    05/29/25 1556 05/29/25 1615 05/29/25 1620   BP: (!) 50/42 (!) 137/96 129/83   Pulse: 87 88 89   Resp: 16 19 17   Temp: 97.3  F (36.3  C)     TempSrc: Temporal     SpO2: 98% (!) 91% 94%   Weight: (!) 140.6 kg (310 lb)     Height: 1.829 m (6')         Cardiac Rhythm:     Was the PSS-3 completed:   Yes  What interventions are required if any?               Family Comments: wife at bedside  OBS brochure/video discussed/provided to patient/family: No              Name of person given brochure if not patient: na              Relationship to patient: na    For the majority of the shift this patient's behavior was Green.   Behavioral interventions performed were none needed, patient is very pleasant.    ED NURSE PHONE NUMBER: [phone]         location identified, draped/prepped, sterile technique used, needle inserted/introduced/connected to a pressurized flush line/positive blood return obtained via catheter/sutured in place/Seldinger technique/all materials/supplies accounted for at end of procedure lumen(s) aspirated and flushed/guidewire recovered/sterile dressing applied/sterile technique, catheter placed/ultrasound guidance

## 2025-05-29 NOTE — H&P
[facility]    History and Physical - Hospitalist Service       Date of Admission:  5/29/2025    Assessment & Plan   Michael Esteban is a 59 year old male with PMH significant for HTN, HLD, hypothyroidism, gout, severe obesity, thoracic aortic aneurysm without rupture, and solitary kidney with history of kidney donation who was recently admitted 4/29/25-5/2/25 with new onset aflutter with RVR, syncope, new onset confusion and memory lapses found to have right parietal CVA and AMAYA on CKD who was registered to observation on 05/29/25 with lightheadedness, dizziness with position changes and OOB, recurrent falls, and concern for orthostatic hypotension vs recurrent Aflutter.      Recent relevant history  Recently admitted 4/29/25-5/2/25 - new onset aflutter with RVR, syncope, new onset confusion and memory lapses found to have right parietal CVA and AMAYA on CKD. At that time was admitted from clinic with palpitations and fainting, cardiology initiated on digoxin and carvedilol 25 mg BID increased to 50 mg BID due to the new finding of Aflutter with RVR. Eliquis for new A-fib and CVA. Found to have WMA on ECHO and EF 40-45%, stress test returned abnormal however cardiology cleared to discharge home with follow up as they suspect false positive for diaphragmatic attenuation. Rate were better on discharge but still in Aflutter with elevation on activity. Was to follow up with cardiology in 4-6 weeks to assess need for cardioversion.    Lightheadedness, dizziness, presyncopal symptoms  Paroxysmal atrial flutter on chronic anticoagulation  *Presented 5/29 with 1 week of increasing lightheadedness and dizziness when up out of bed, changing positions, and trying to work with PT, resulting in several falls. Denies palpitations or syncopal episodes. No obvious injuries. No N/V/Abd pain. No CP or FAIR. No new confusion or focal deficits.  Stable cognitive issues since CVA without other deficits.  No  infectious symptoms.    *In the ED HR 170s unclear if SVT vs Aflutter followed by Afib with CVR and RVR then back to NSR. Pt denies any symptoms at the time of these arrhythmias. ED workup largely negative. TSH, troponin x 2, and lactate WNL. proBNP 816.  Digoxin level <0.4, though reports compliance.  Head CT and CXR negative for acute pathology.  EKG reviewed, NSR with first-degree AV block, nonspecific ST abnormality, reviewed [id number] with 4/30/2025 EKG and no significant change noted.  Given symptoms patient registered observation to expedite cardiac evaluation.    *Ddx orthostatic hypotension, presyncope related to adjustment of cardiac medications (increasing Coreg dosing and new start digoxin), atrial flutter recurrence and burden although denies any palpitations, he notes he did not have any symptoms when HR 170s in the ED, less likely recurrent CVA versus other.  - Observation status  - Cardiac monitoring  - Given NS 500mL in the ED, defer further fluids for now and monitor  given known EF 40-45% and prBNP 816  - Orthostatics Q 8 hours  - Continue PTA digoxin 125mcg/d  - Decrease carvedilol back down from 50 mg twice daily to 25 mg twice daily for now  -cautious and close monitoring given risk of Aflutter with reducing dose of BB, may consider metoprolol as well, defer to cardiology  - Cardiology consultation - new presyncopal symptoms in setting of recent medication changes as well as a flutter with RVR  - Continue PTA DOAC  - PRN IV metoprolol for sustained heart rate >120  - Monitor K+/Mg++, replace PRN (no protocol given CKD)  - Supportive care  - If cardiac workup is negative and symptoms persist could consider brain MRI however seems low likelihood that this would be any recurrent stroke with lack of any new focal deficits  - PT/SW/CC consultations - weak and falling, may need TCU on discharge, using a walker most recently, lives with wife  - If workup completed, Cr improved/stable, passes PT and  doing well on 5/30/25 could likely discharge in 1 to 2 days    Acute on chronic kidney disease, stage 3a  Live kidney donor s/p nephrectomy  Baseline Cr 1.5. On admission, 1.83.  May also lead to the patient's been having orthostatic hypotension with slight bump in creatinine.  - Hold PTA ACE inhibitor for now  - Avoid nephrotoxins - contrast, NSAIDs  - Renally dose medications as able/needed  - Monitor    Recent new onset confusion with memory lapses  R parietal CVA  Found to have Rt parietal CVA last admission after reporting he was fired form his [employer] job due to having new on set confusion, memory issues and inability to perform previously normal tasks. CT positive for chronic R parietal stroke. TTE without thrombus. LDL noted to be quite low so statin was stopped on discharge.  Has had some ongoing confusion and memory lapses that have been stable, no new neurological deficits or focal weakness.  - Continue PTA Therese, pt reports compliance    Benign essential hypertension  PTA regimen: Carvedilol 50 mg twice daily, digoxin 125 mcg daily, lisinopril 20 mg daily  - Continue PTA antihypertensives with hold parameters  - Hold PTA ACE inhibitor due to AMAYA  - Decrease carvedilol to 25 mg twice daily as above due to presyncopal symptoms  - PRN IV hydralazine available for SBP >180  - Monitor BP trend and need for medication adjustments      Chronic stable diagnoses and other pertinent medical history: Appropriate PTA medications will be resumed. Nonessential medications will be held if patient is observation status.   Hypothyroidism: Continue PTA levothyroxine  AAA with repair in 2015  Gout: continue PTA allopurinol  BERNADETTE: home cpap settings        Diet:  Cardiac  DVT Prophylaxis: DOAC  Valdez Catheter: Not present  Lines: None     Cardiac Monitoring: None  Code Status:  Full code, discussed with patient and wife on admission    Clinically Significant Risk Factors Present on Admission                # Drug Induced  Coagulation Defect: home medication list includes an anticoagulant medication    # Hypertension: Noted on problem list           # Morbid Obesity: Estimated body mass index is 42.04 kg/m  as calculated from the following:    Height as of this encounter: 1.829 m (6').    Weight as of this encounter: 140.6 kg (310 lb).         # Financial/Environmental Concerns:           Disposition Plan     Medically Ready for Discharge: Anticipated Tomorrow pending cardiac workup and clinical improvement         The patient's care was discussed with the Attending Physician, Dr. Bryan, Patient, Patient's Family, and ED physician.    Sol Knutson PA-C  Hospitalist Service  [facility]  Securely message with Beautylish (more info)  Text page via [facility] Paging/Directory     ______________________________________________________________________    Chief Complaint   Dizziness, lightheadedness    History is obtained from the patient, electronic health record, emergency department physician, and patient's spouse    History of Present Illness   Michael Esteban is a 59 year old male with PMH significant for HTN, HLD, hypothyroidism, gout, severe obesity, thoracic aortic aneurysm without rupture, and solitary kidney with history of kidney donation who was recently admitted 4/29/25-5/2/25 with new onset aflutter with RVR, syncope, new onset confusion and memory lapses found to have right parietal CVA and AMAYA on CKD.  At that time was admitted from clinic with palpitations and fainting, he was seen by cardiology, initiated on digoxin which was a new medication and his carvedilol 25 mg twice daily was increased to 50 mg twice daily due to the new finding of a flutter with RVR.  He was also started on Eliquis for new A-fib and CVA. Found to have WMA on echo, stress test returned abnormal however cardiology cleared to discharge home with follow up as they suspect false positive for diaphragmatic attenuation. Rate  were better on discharge but still in Aflutter with elevation on activity. Was to follow up with cardiology in 4-6 weeks to assess need for cardioversion.    Presented to the ED on 5/29 with 1 week of increasing worsening of symptoms, reporting lightheadedness and dizziness when up out of bed, changing positions, and trying to work with PT, resulting in several falls.  No obvious injuries.  No nausea, vomiting, abdominal pain.  No chest pain or dyspnea on exertion.  No new confusion or focal deficits.  Stable cognitive issues since CVA without other deficits.  No infectious symptoms.  Denies palpitations or syncopal episodes.  In the ED he was noted to have a heart rate in the 170s on arrival unclear if this was SVT versus a flutter, then went into A-fib with controlled ventricular rate and then RVR and then stopped.  ED workup revealed normal TSH, troponin x 2, and lactate.  Creatinine bump to 183 from most recent level 1.37, note is a solitary kidney as he is a previous kidney donor  proBNP 816.  Digoxin level less than 0.4.  Head CT and chest x-ray negative for acute pathology.  EKG reviewed, NSR with first-degree AV block, nonspecific ST abnormality, reviewed [id number] with 4/30/2025 EKG and no significant change noted.  Given symptoms patient registered observation to expedite cardiac evaluation.      Past Medical History    Past Medical History:   Diagnosis Date    Gout     Hyperlipidemia     Hypertension     Hypothyroidism     Kidney donor 1992     donated Left kidney to mother in 1992    Morbid (severe) obesity due to excess calories (H)     comorbid w/: Htn, Erectile dysfunction, Swelling of legs, Excessively sleep during the day, Awaken from sleep to catch breath, Shortness of Breath with activity, Loud snoring, Stop breathing while asleep.     Thoracic aortic aneurysm without rupture     4.7 cm - s/p repair 2/2015       Past Surgical History   Past Surgical History:   Procedure Laterality Date     ARTHROSCOPY KNEE Right 2005     COLONOSCOPY N/A 1/24/2017    Procedure: COLONOSCOPY;  Surgeon: Maikol Faulkner MD;  Location: [facility]    REPAIR ANEURYSM ASCENDING AORTA N/A 2/17/2015    Procedure: REPAIR ANEURYSM ASCENDING AORTA;  Surgeon: Monae Lechuga MD;  Location: [facility]    REPAIR PATENT FORAMEN OVALE N/A 2/17/2015    Procedure: REPAIR PATENT FORAMEN OVALE;  Surgeon: Monae Lechuga MD;  Location: [facility] OR    s/p left nephrectomy - donor to mother  1992    TONSILLECTOMY  age 3        Prior to Admission Medications   Prior to Admission Medications   Prescriptions Last Dose Informant Patient Reported? Taking?   acetaminophen (TYLENOL) 500 MG tablet  Self Yes No   Sig: Take 500 mg by mouth every 6 hours as needed for mild pain   allopurinol (ZYLOPRIM) 300 MG tablet  Self Yes No   Sig: Take 300 mg by mouth daily.   apixaban ANTICOAGULANT (ELIQUIS) 5 MG tablet   No No   Sig: Take 1 tablet (5 mg) by mouth 2 times daily.   carvedilol (COREG) 25 MG tablet   No No   Sig: Take 2 tablets (50 mg) by mouth 2 times daily (with meals).   digoxin (LANOXIN) 125 MCG tablet   No No   Sig: Take 1 tablet (125 mcg) by mouth daily.   levothyroxine (SYNTHROID/LEVOTHROID) 75 MCG tablet  Self No No   Sig: Take 1 tablet (75 mcg) by mouth daily   lisinopril (ZESTRIL) 20 MG tablet  Self No No   Sig: TAKE 1 TABLET BY MOUTH EVERY DAY   order for DME  Self Yes No   Sig: Equipment being ordered: RESMED AIRSENSE 10 WITH HH AND RESMED AIRFIT P10 PILLOW MASK.   sildenafil (VIAGRA) 50 MG tablet  Self No No   Sig: Take 1 tablet (50 mg) by mouth daily as needed (for erectile dysfunction) 30 min to 4 hrs before sex. Do not use with nitroglycerin, terazosin or doxazosin.      Facility-Administered Medications: None        Review of Systems    The 10 point Review of Systems is negative other than noted in the HPI or here.     Social History   I have reviewed this patient's social history and updated it with pertinent information if  needed.  Social History     Tobacco Use    Smoking status: Some Days     Types: Pipe, Cigars    Smokeless tobacco: Never    Tobacco comments:     strongly encourage full cessation secondary to TAA. Occ. cigar    Substance Use Topics    Alcohol use: Yes     Alcohol/week: 0.0 standard drinks of alcohol     Comment: couple of beers each week    Drug use: No     Comment: used to smoke pot in the [place]          Allergies   Allergies   Allergen Reactions    Thalidomide      Reaction during stress test          Physical Exam   Vital Signs: Temp: 97.3  F (36.3  C) Temp src: Temporal BP: (!) 141/99 Pulse: 88   Resp: 10 SpO2: 97 % O2 Device: None (Room air)    Weight: 310 lbs 0 oz    General: Awake, alert, very pleasant man who appears stated age. Looks comfortable sitting up in bed. No acute distress.  HEENT: Normocephalic, atraumatic. Extraocular movements intact.   Respiratory: Clear to auscultation bilaterally, no rales, wheezing, or rhonchi.  Cardiovascular: Regular rate and rhythm, +S1 and S2, no murmur auscultated. No peripheral edema.   Gastrointestinal: Soft, non-tender, non-distended. Bowel sounds present.  Skin: Warm, dry. No obvious rashes or lesions on exposed skin. Dorsalis pedis pulses palpable bilaterally.  Musculoskeletal: No joint swelling, erythema or tenderness. Moves all extremities equally.  Neurologic: AAO x3. Cranial nerves 2-12 grossly intact, normal strength and sensation.  Psychiatric: Appropriate mood and affect. No obvious anxiety or depression.      Medical Decision Making       >60 MINUTES SPENT BY ME on the date of service doing chart review, history, exam, documentation & further activities per the note.      Data     I have personally reviewed the following data over the past 24 hrs:    8.9  \   15.4   / 250     142 102 23.2 (H) /  120 (H)   4.9 29 1.83 (H) \     Trop: 10 BNP: 816 (H)     TSH: 1.33 T4: N/A A1C: N/A     Procal: N/A CRP: N/A Lactic Acid: 2.0         Imaging results reviewed  over the past 24 hrs:   Recent Results (from the past 24 hours)   CT Head w/o Contrast    Narrative    EXAM: CT HEAD W/O CONTRAST  LOCATION: [facility]  DATE: 5/29/2025    INDICATION: Multiple falls, anticoagulated  COMPARISON: CT 5/1/2025.  TECHNIQUE: Routine CT Head without IV contrast. Multiplanar reformats. Dose reduction techniques were used.    FINDINGS:   INTRACRANIAL CONTENTS: No intracranial hemorrhage, extraaxial collection, or mass effect.  No CT evidence of acute infarct. Old infarct right parietal lobe. Mild presumed chronic small vessel ischemic changes. Mild generalized volume loss. No   hydrocephalus.     VISUALIZED ORBITS/SINUSES/MASTOIDS: No intraorbital abnormality. No paranasal sinus mucosal disease. No middle ear or mastoid effusion.    BONES/SOFT TISSUES: No acute abnormality.      Impression    IMPRESSION:  1.  No CT evidence for acute intracranial process.  2.  Brain atrophy and presumed chronic microvascular ischemic changes as above.     XR Chest 2 Views    Narrative    EXAM: XR CHEST 2 VIEWS  LOCATION: [facility]  DATE: 5/29/2025    INDICATION: lightheadedness  COMPARISON: X-ray 4/29/2025      Impression    IMPRESSION: Sternotomy. No acute findings. The lungs are clear and there are no pleural effusions. Normal heart size.

## 2025-05-29 NOTE — ED TRIAGE NOTES
Patient here with wife for dizziness, blurry vision started on Friday. Wife reports patient had CVA one month ago and short term memory issues as residual.      Triage Assessment (Adult)       Row Name 05/29/25 0597          Triage Assessment    Airway WDL WDL        Respiratory WDL    Respiratory WDL WDL        Skin Circulation/Temperature WDL    Skin Circulation/Temperature WDL WDL        Cardiac WDL    Cardiac WDL X        Peripheral/Neurovascular WDL    Peripheral Neurovascular WDL X        Cognitive/Neuro/Behavioral WDL    Cognitive/Neuro/Behavioral WDL X

## 2025-05-30 ENCOUNTER — APPOINTMENT (OUTPATIENT)
Dept: PHYSICAL THERAPY | Facility: CLINIC | Age: 60
End: 2025-05-30
Attending: PHYSICIAN ASSISTANT
Payer: COMMERCIAL

## 2025-05-30 VITALS
HEART RATE: 87 BPM | TEMPERATURE: 97.8 F | DIASTOLIC BLOOD PRESSURE: 85 MMHG | BODY MASS INDEX: 42.66 KG/M2 | OXYGEN SATURATION: 98 % | WEIGHT: 315 LBS | RESPIRATION RATE: 16 BRPM | HEIGHT: 72 IN | SYSTOLIC BLOOD PRESSURE: 122 MMHG

## 2025-05-30 LAB
ANION GAP SERPL CALCULATED.3IONS-SCNC: 10 MMOL/L (ref 7–15)
ATRIAL RATE - MUSE: 88 BPM
BUN SERPL-MCNC: 20.6 MG/DL (ref 8–23)
CALCIUM SERPL-MCNC: 9.3 MG/DL (ref 8.8–10.4)
CHLORIDE SERPL-SCNC: 107 MMOL/L (ref 98–107)
CREAT SERPL-MCNC: 1.37 MG/DL (ref 0.67–1.17)
DIASTOLIC BLOOD PRESSURE - MUSE: NORMAL MMHG
EGFRCR SERPLBLD CKD-EPI 2021: 59 ML/MIN/1.73M2
ERYTHROCYTE [DISTWIDTH] IN BLOOD BY AUTOMATED COUNT: 13.3 % (ref 10–15)
GLUCOSE SERPL-MCNC: 99 MG/DL (ref 70–99)
HCO3 SERPL-SCNC: 25 MMOL/L (ref 22–29)
HCT VFR BLD AUTO: 45 % (ref 40–53)
HGB BLD-MCNC: 14.9 G/DL (ref 13.3–17.7)
INTERPRETATION ECG - MUSE: NORMAL
MCH RBC QN AUTO: 29.9 PG (ref 26.5–33)
MCHC RBC AUTO-ENTMCNC: 33.1 G/DL (ref 31.5–36.5)
MCV RBC AUTO: 90 FL (ref 78–100)
P AXIS - MUSE: NORMAL DEGREES
PLATELET # BLD AUTO: 205 10E3/UL (ref 150–450)
POTASSIUM SERPL-SCNC: 4.3 MMOL/L (ref 3.4–5.3)
PR INTERVAL - MUSE: 244 MS
QRS DURATION - MUSE: 88 MS
QT - MUSE: 332 MS
QTC - MUSE: 401 MS
R AXIS - MUSE: 64 DEGREES
RBC # BLD AUTO: 4.98 10E6/UL (ref 4.4–5.9)
SODIUM SERPL-SCNC: 142 MMOL/L (ref 135–145)
SYSTOLIC BLOOD PRESSURE - MUSE: NORMAL MMHG
T AXIS - MUSE: 54 DEGREES
VENTRICULAR RATE- MUSE: 88 BPM
WBC # BLD AUTO: 7.7 10E3/UL (ref 4–11)

## 2025-05-30 PROCEDURE — 36415 COLL VENOUS BLD VENIPUNCTURE: CPT | Performed by: PHYSICIAN ASSISTANT

## 2025-05-30 PROCEDURE — 97530 THERAPEUTIC ACTIVITIES: CPT | Mod: GP | Performed by: PHYSICAL THERAPIST

## 2025-05-30 PROCEDURE — 82565 ASSAY OF CREATININE: CPT | Performed by: PHYSICIAN ASSISTANT

## 2025-05-30 PROCEDURE — 250N000013 HC RX MED GY IP 250 OP 250 PS 637: Performed by: PHYSICIAN ASSISTANT

## 2025-05-30 PROCEDURE — 99207 PR NO BILLABLE SERVICE THIS VISIT: CPT | Performed by: STUDENT IN AN ORGANIZED HEALTH CARE EDUCATION/TRAINING PROGRAM

## 2025-05-30 PROCEDURE — 97116 GAIT TRAINING THERAPY: CPT | Mod: GP | Performed by: PHYSICAL THERAPIST

## 2025-05-30 PROCEDURE — G0378 HOSPITAL OBSERVATION PER HR: HCPCS

## 2025-05-30 PROCEDURE — 85048 AUTOMATED LEUKOCYTE COUNT: CPT | Performed by: PHYSICIAN ASSISTANT

## 2025-05-30 PROCEDURE — 97161 PT EVAL LOW COMPLEX 20 MIN: CPT | Mod: GP | Performed by: PHYSICAL THERAPIST

## 2025-05-30 RX ORDER — DIGOXIN 125 MCG
125 TABLET ORAL DAILY
Qty: 30 TABLET | Refills: 0 | Status: SHIPPED | OUTPATIENT
Start: 2025-05-30

## 2025-05-30 RX ORDER — CARVEDILOL 25 MG/1
25 TABLET ORAL 2 TIMES DAILY WITH MEALS
Qty: 60 TABLET | Refills: 0 | Status: SHIPPED | OUTPATIENT
Start: 2025-05-30

## 2025-05-30 RX ADMIN — ALLOPURINOL 300 MG: 300 TABLET ORAL at 09:33

## 2025-05-30 RX ADMIN — CARVEDILOL 25 MG: 25 TABLET, FILM COATED ORAL at 09:33

## 2025-05-30 RX ADMIN — LEVOTHYROXINE SODIUM 75 MCG: 75 TABLET ORAL at 06:55

## 2025-05-30 RX ADMIN — DIGOXIN 125 MCG: 125 TABLET ORAL at 09:33

## 2025-05-30 RX ADMIN — APIXABAN 5 MG: 5 TABLET, FILM COATED ORAL at 09:33

## 2025-05-30 ASSESSMENT — ACTIVITIES OF DAILY LIVING (ADL)
ADLS_ACUITY_SCORE: 32

## 2025-05-30 NOTE — PLAN OF CARE
PRIMARY Concern:Admitted for dizziness, blurry vision   SAFETY RISK Concerns (fall risk, behaviors, etc.): fall risk  Aggression Tool Color: Green   Isolation/Type: None   Tests/Procedures for NEXT shift: Am labs   Consults? (Pending/following, signed-off?) Cardiology/ CM/SW/OT Consults  Where is patient from? (Home, TCU, etc.): Home   Other Important info for NEXT shift:   Anticipated DC date & active delays: TBD  SUMMARY NOTE:   Orientation/Cognitive: AXO4  Observation Goals (Met/ Not Met): OBS  Mobility Level/Assist Equipment: SBA gb/w  Antibiotics & Plan (IV/po, length of tx left): N/A  Pain Management: Denies pain   Complete Pain Reassessment: Y/N Due next:  Tele/VS/O2: VSS on RA  ABNL Lab/BG: See result   Diet: Reg   Bowel/Bladder: Con B/B  Skin Concerns:old surgical scab on mid chest. Scattered scab  Drains/Devices: PIV sl   Patient Stated Goal for Today: Sleep

## 2025-05-30 NOTE — PROGRESS NOTES
"   05/30/25 1000   Appointment Info   Signing Clinician's Name / Credentials (PT) Sabino Tapia DPT       Present no   Living Environment   People in Home spouse   Current Living Arrangements house   Home Accessibility stairs to enter home;stairs within home   Number of Stairs, Main Entrance 1   Stair Railings, Main Entrance none   Number of Stairs, Within Home, Primary greater than 10 stairs   Stair Railings, Within Home, Primary railings safe and in good condition   Transportation Anticipated family or friend will provide   Living Environment Comments Pt lives in a house with his spouse. Stair to enter. Pt reports he stays on the main floor. Pt's spouse will pick pt up upon discharge and provide assist as needed.   Self-Care   Usual Activity Tolerance good   Current Activity Tolerance good   Regular Exercise No   Equipment Currently Used at Home none   Fall history within last six months yes   Number of times patient has fallen within last six months 3   Activity/Exercise/Self-Care Comment Pt reports being IND at baseline. Pt ambulates w/o an AD.   General Information   Onset of Illness/Injury or Date of Surgery 05/30/25   Referring Physician Sol Knutson PA-C   Patient/Family Therapy Goals Statement (PT) \"To go home\"   Pertinent History of Current Problem (include personal factors and/or comorbidities that impact the POC) Per Chart: Michael Esteban is a 59 year old male with PMH significant for HTN, HLD, hypothyroidism, gout, severe obesity, thoracic aortic aneurysm without rupture, and solitary kidney with history of kidney donation who was recently admitted 4/29/25-5/2/25 with new onset aflutter with RVR, syncope, new onset confusion and memory lapses found to have right parietal CVA and AMAYA on CKD who was registered to observation on 05/29/25 with lightheadedness, dizziness with position changes and OOB, recurrent falls, and concern for orthostatic hypotension vs " recurrent Aflutter.   Existing Precautions/Restrictions fall   Weight-Bearing Status - LLE full weight-bearing   Weight-Bearing Status - RLE full weight-bearing   Cognition   Orientation Status (Cognition) oriented x 3   Pain Assessment   Patient Currently in Pain No   Integumentary/Edema   Integumentary/Edema no deficits were identifed   Posture    Posture Forward head position   Range of Motion (ROM)   Range of Motion ROM is WFL   Strength (Manual Muscle Testing)   Strength (Manual Muscle Testing) strength is WFL   Bed Mobility   Comment, (Bed Mobility) Supine>sit w/ IND   Transfers   Comment, (Transfers) Sit>Stand w/o AD and SBA   Gait/Stairs (Locomotion)   [place] Level (Gait) supervision   Assistive Device (Gait)   (no AD)   Distance in Feet (Gait) 5'   Balance   Balance Comments Adequate static sitting balance; pt ambulates w/o an AD   Sensory Examination   Sensory Perception patient reports no sensory changes   Clinical Impression   Criteria for Skilled Therapeutic Intervention Yes, treatment indicated   PT Diagnosis (PT) Impaired gait   Influenced by the following impairments Decreased activity tolerance   Functional limitations due to impairments Impaired functional mobility   Clinical Presentation (PT Evaluation Complexity) stable   Clinical Presentation Rationale Clinical judgement   Clinical Decision Making (Complexity) low complexity   Planned Therapy Interventions (PT) balance training;gait training;home exercise program;patient/family education;stair training;strengthening;transfer training   Risk & Benefits of therapy have been explained evaluation/treatment results reviewed;care plan/treatment goals reviewed;risks/benefits reviewed;current/potential barriers reviewed;participants voiced agreement with care plan;participants included;patient;spouse/significant other   PT Total Evaluation Time   PT Eval, Low Complexity Minutes (40821) 10   Physical Therapy Goals   PT Frequency One time eval and  treatment only   PT Predicted Duration/Target Date for Goal Attainment 06/02/25   PT Goals Bed Mobility;Transfers;Gait;Stairs   PT: Bed Mobility Independent;Supine to/from sit;Rolling;Goal Met   PT: Transfers Independent;Sit to/from stand;Bed to/from chair;Goal Met   PT: Gait Independent;150 feet;Goal Met   PT: Stairs Supervision/stand-by assist;3 stairs;Goal Met   Therapeutic Activity   Therapeutic Activities: dynamic activities to improve functional performance Minutes (76180) 13   Symptoms Noted During/After Treatment None   Treatment Detail/Skilled Intervention Greeted pt supine in bed, agreed to PT. VSS on RA throughout session. Pt performed supine>sit w/ IND. Once in sitting, pt able to scoot self to EOB and sit unsupported without LOB. BP taken to assess for orthostatic hypotension, see VSFS for details. Pt performed sit>stand x 3 w/o AD and SBA, cues for hand placement. After ambulation, pt returned to sitting at EOB w/ IND, cues to descend in a slow, controlled motion. Pt ended session sitting at EOB, with all needs met and call light within reach.   Gait Training   Gait Training Minutes (05495) 15   Symptoms Noted During/After Treatment (Gait Training) none   Treatment Detail/Skilled Intervention Pt ambulated w/o AD and SBA progressing to IND. Pt ambulated w/ decreased giat speed, downward gaze, and decreased step length. Verbal cues for upright gaze and posture, and to increase step length. Good carryover with cues. PT introduced dynamic balance challenges during ambulation including vertical/horizontal head turns, changes in gait speed, retroambulation, sidestepping, ambulation with eyes closed and picking objects off the ground. Mild path deviations but no overt LOB noted. Pt negotiated 4 stairs continuously using R rail and SBA. PT provided visual demo for safe stair negotiation. Pt negotiated with a step-to pattern. Pt tolerated activity well.   Distance in Feet 500'   PT Discharge Planning   PT Plan  DC   PT Discharge Recommendation (DC Rec) home with assist   PT Rationale for DC Rec Pt appears near baseline for functional mobility. Pt demonstrates safe and effective techniques with all functional mobility w/o AD, including stairs. Anticipate at time of discharge pt will be able to safely return home with assist from spouse. IPPT goals met.   PT Brief overview of current status Goals of therapy will be to address safe mobility and make recs for d/c to next level of care. Pt and RN will continue to follow all falls risk precautions as documented by RN staff while hospitalized. IND w/o AD   PT Total Distance Amb During Session (feet) 500   Physical Therapy Time and Intention   Timed Code Treatment Minutes 28   Total Session Time (sum of timed and untimed services) 38       Physical Therapy Discharge Summary    Reason for therapy discharge:    All goals and outcomes met, no further needs identified.    Progress towards therapy goal(s). See goals on Care Plan in [facility] electronic health record for goal details.  Goals met    Therapy recommendation(s):    Recommend pt continue to ambulate with nursing staff throughout remainder of hospital stay. IPPT goals met.

## 2025-05-30 NOTE — PHARMACY-ADMISSION MEDICATION HISTORY
Pharmacist Admission Medication History    Admission medication history is complete. The information provided in this note is only as accurate as the sources available at the time of the update.    Information Source(s): Family member via in-person    Pertinent Information: None    Changes made to PTA medication list:  Added: None  Deleted: None  Changed: None    Allergies reviewed with patient and updates made in EHR: no    Medication History Completed By: Edelmira Amin RPH 5/29/2025 8:02 PM    PTA Med List   Medication Sig Last Dose/Taking    acetaminophen (TYLENOL) 500 MG tablet Take 500 mg by mouth every 6 hours as needed for mild pain Taking As Needed    allopurinol (ZYLOPRIM) 300 MG tablet Take 300 mg by mouth daily. 5/29/2025 Morning    apixaban ANTICOAGULANT (ELIQUIS) 5 MG tablet Take 1 tablet (5 mg) by mouth 2 times daily. 5/29/2025 Morning    atorvastatin (LIPITOR) 40 MG tablet Take 40 mg by mouth daily. 5/29/2025 Morning    carvedilol (COREG) 25 MG tablet Take 2 tablets (50 mg) by mouth 2 times daily (with meals). 5/29/2025 Morning    digoxin (LANOXIN) 125 MCG tablet Take 1 tablet (125 mcg) by mouth daily. 5/29/2025 Morning    levothyroxine (SYNTHROID/LEVOTHROID) 75 MCG tablet Take 1 tablet (75 mcg) by mouth daily 5/29/2025 Morning    lisinopril (ZESTRIL) 20 MG tablet TAKE 1 TABLET BY MOUTH EVERY DAY 5/29/2025 Morning    sildenafil (VIAGRA) 50 MG tablet Take 1 tablet (50 mg) by mouth daily as needed (for erectile dysfunction) 30 min to 4 hrs before sex. Do not use with nitroglycerin, terazosin or doxazosin. Taking As Needed

## 2025-05-30 NOTE — PROGRESS NOTES
Brief hospitalist note     Pt feeling much better. No recurrent dizziness/lightheadedness. HRs controlled. Worked with PT and felt well. Wants to go home.     Will discontinue cardiology consult. They have follow-up next week. Will continue with lower dose carvedilol, discontinue lisinopril and send him with a heart monitor. He already has follow-up scheduled with his cardiologist next week.     Full discharge note to follow.    Yarely Bryan MD

## 2025-05-30 NOTE — PROVIDER NOTIFICATION
Paged hospitalist for the bedside nurse. Patients IV came out, he is on tele and waiting for cards to see do you want a new PIV placed?     Ok to leave out for now.

## 2025-05-30 NOTE — PROGRESS NOTES
Observation goals  PRIOR TO DISCHARGE        Comments: -diagnostic tests and consults completed and resulted- not met, PT/ OT consults  -vital signs normal or at patient baseline- met  -returns to baseline functional status- partially met  -safe disposition plan has been identified- not met  Nurse to notify provider when observation goals have been met and patient is ready for discharge

## 2025-05-30 NOTE — PROGRESS NOTES
Observation goals  PRIOR TO DISCHARGE        Comments: -diagnostic tests and consults completed and resulted- not completed   -vital signs normal or at patient baseline- not completed   -returns to baseline functional status- not competed   -safe disposition plan has been identified- not completed   Nurse to notify provider when observation goals have been met and patient is ready for discha

## 2025-05-30 NOTE — PLAN OF CARE
PRIMARY Concern: dizziness  SAFETY RISK Concerns (fall risk, behaviors, etc.): fall risk      Aggression Tool Color: green  Isolation/Type: n/a  Tests/Procedures for NEXT shift: none  Consults? (Pending/following, signed-off?) cards discontinued, PT saw pt  Where is patient from? (Home, TCU, etc.): home with wife  Other Important info for NEXT shift: n/a  Anticipated DC date & active delays: today around 1300    SUMMARY NOTE:   Orientation/Cognitive: A&Ox4  Observation Goals (Met/ Not Met): met  Mobility Level/Assist Equipment: SBA GB walker  Antibiotics & Plan (IV/po, length of tx left): n/a  Pain Management: denies pain  Complete Pain Reassessment: Y/N yes Due next: n/a  Tele/VS/O2: VSS. RA tele: NSr with 1st degree AVB.   ABNL Lab/BG: Cr 1.37  Diet: regular  Bowel/Bladder: continent  Drains/Devices: fell out in bathroom, no need anymore  Patient Stated Goal for Today: go home  Discharge instructions and meds given to pt, home with wife. No questions.

## 2025-05-31 ENCOUNTER — ORDERS ONLY (AUTO-RELEASED) (OUTPATIENT)
Dept: MEDSURG UNIT | Facility: CLINIC | Age: 60
End: 2025-05-31
Payer: COMMERCIAL

## 2025-05-31 DIAGNOSIS — I48.91 ATRIAL FIBRILLATION WITH RVR (H): ICD-10-CM

## 2025-05-31 DIAGNOSIS — R55 SYNCOPE, UNSPECIFIED SYNCOPE TYPE: ICD-10-CM

## 2025-06-02 ENCOUNTER — THERAPY VISIT (OUTPATIENT)
Dept: PHYSICAL THERAPY | Facility: CLINIC | Age: 60
End: 2025-06-02
Attending: STUDENT IN AN ORGANIZED HEALTH CARE EDUCATION/TRAINING PROGRAM
Payer: COMMERCIAL

## 2025-06-02 ENCOUNTER — THERAPY VISIT (OUTPATIENT)
Dept: OCCUPATIONAL THERAPY | Facility: CLINIC | Age: 60
End: 2025-06-02
Attending: STUDENT IN AN ORGANIZED HEALTH CARE EDUCATION/TRAINING PROGRAM
Payer: COMMERCIAL

## 2025-06-02 DIAGNOSIS — I63.9 CEREBROVASCULAR ACCIDENT (CVA), UNSPECIFIED MECHANISM (H): ICD-10-CM

## 2025-06-02 DIAGNOSIS — I63.9 CEREBROVASCULAR ACCIDENT (CVA), UNSPECIFIED MECHANISM (H): Primary | ICD-10-CM

## 2025-06-02 LAB
ATRIAL RATE - MUSE: 182 BPM
DIASTOLIC BLOOD PRESSURE - MUSE: NORMAL MMHG
INTERPRETATION ECG - MUSE: NORMAL
P AXIS - MUSE: NORMAL DEGREES
PR INTERVAL - MUSE: 222 MS
QRS DURATION - MUSE: 90 MS
QT - MUSE: 310 MS
QTC - MUSE: 387 MS
R AXIS - MUSE: 63 DEGREES
SYSTOLIC BLOOD PRESSURE - MUSE: NORMAL MMHG
T AXIS - MUSE: 59 DEGREES
VENTRICULAR RATE- MUSE: 94 BPM

## 2025-06-02 PROCEDURE — 97535 SELF CARE MNGMENT TRAINING: CPT | Mod: GP | Performed by: PHYSICAL THERAPIST

## 2025-06-02 PROCEDURE — 97535 SELF CARE MNGMENT TRAINING: CPT | Mod: GO

## 2025-06-03 ENCOUNTER — PATIENT OUTREACH (OUTPATIENT)
Dept: CARE COORDINATION | Facility: CLINIC | Age: 60
End: 2025-06-03
Payer: COMMERCIAL

## 2025-06-03 NOTE — PROGRESS NOTES
[facility]: Transitions of Care Outreach  Chief Complaint   Patient presents with    Clinic Care Coordination - Post Hospital       Most Recent Admission Date: 5/29/2025   Most Recent Admission Diagnosis: Atrial fibrillation with RVR (H) - I48.91  Orthostatic lightheadedness - R42  Orthostatic syncope - I95.1     Most Recent Discharge Date: 5/30/2025   Most Recent Discharge Diagnosis: Orthostatic syncope - I95.1  Orthostatic lightheadedness - R42  Atrial fibrillation with RVR (H) - I48.91  Cerebrovascular accident (CVA), unspecified mechanism (H) - I63.9  Syncope, unspecified syncope type - R55     Transitions of Care Assessment    Discharge Assessment  How are you doing now that you are home?: Patient states he is doing good.  How are your symptoms? (Red Flag symptoms escalate to triage hotline per guidelines): Improved  Do you know how to contact your clinic care team if you have future questions or changes to your health status? : Yes  Does the patient have their discharge instructions? : Unknown (Declined having RN go over them with him.)  Does the patient have questions regarding their discharge instructions? : No  Were you started on any new medications or were there changes to any of your previous medications? : Yes  Does the patient have all of their medications?:  (Patient is unsure as his wife takes care of his medications.)  Do you have questions regarding any of your medications? : No  Do you have all of your needed medical supplies or equipment (DME)?  (i.e. oxygen tank, CPAP, cane, etc.): Yes         Post-op (Clinicians Only)  Did the patient have surgery or a procedure: No  Fever: No  Chills: No      Follow up Plan     Discharge Follow-Up  Discharge follow up appointment scheduled in alignment with recommended follow up timeframe or Transitions of Risk Category? (Low = within 30 days; Moderate= within 14 days; High= within 7 days): Yes  Discharge Follow Up Appointment Date:  06/04/25  Discharge Follow Up Appointment Scheduled with?: Specialty Care Provider    Future Appointments   Date Time Provider Department Center   6/4/2025  7:30 AM Brad Fernandez MD [facility]   6/9/2025  2:00 PM Darlin Jara, PT [facility]   6/9/2025  3:00 PM Cochran, Maribell M, OTR [facility]   6/16/2025  2:00 PM Darlin Jara, PT [facility]   6/16/2025  3:00 PM Cochran, Maribell M, OTR [facility]   6/23/2025  3:00 PM Cochran, Maribell M, OTR [facility]   6/30/2025  3:00 PM Cochran, Maribell M, OTR [facility]   7/7/2025  3:00 PM Cochran, Maribell M, OTR [facility]   7/14/2025  3:00 PM Cochran, Maribell M, OTR [facility]   7/21/2025  3:00 PM Cochran, Maribell M, OTR [facility]   7/23/2025 10:00 AM Torri Omer PA-C [facility]       Outpatient Plan as outlined on AVS reviewed with patient.    For any urgent concerns, please contact our 24 hour nurse triage line: [phone] ([phone])       Kandi Hill RN

## 2025-06-04 ENCOUNTER — OFFICE VISIT (OUTPATIENT)
Dept: CARDIOLOGY | Facility: CLINIC | Age: 60
End: 2025-06-04
Attending: NURSE PRACTITIONER
Payer: COMMERCIAL

## 2025-06-04 VITALS
SYSTOLIC BLOOD PRESSURE: 130 MMHG | DIASTOLIC BLOOD PRESSURE: 63 MMHG | HEART RATE: 96 BPM | BODY MASS INDEX: 42.66 KG/M2 | WEIGHT: 315 LBS | HEIGHT: 72 IN

## 2025-06-04 DIAGNOSIS — I25.5 ISCHEMIC CARDIOMYOPATHY: Primary | ICD-10-CM

## 2025-06-04 DIAGNOSIS — I48.3 TYPICAL ATRIAL FLUTTER (H): ICD-10-CM

## 2025-06-04 DIAGNOSIS — I48.91 ATRIAL FIBRILLATION WITH RVR (H): ICD-10-CM

## 2025-06-04 NOTE — LETTER
6/4/2025    Pricila Coburn MD  [address] [facility] [address]    RE: Michael Esteban       Dear Colleague,     I had the pleasure of seeing Michael Esteban in the [facility].    Electrophysiology Clinic Progress Note    Michael Esteban MRN# [id number]   YOB: 1965 Age: 60 year old     Primary cardiologist:          Assessment and Plan   60-year-old gentleman with history of ascending aortic aneurysm repair done in 02/2015  using a Gelweave 30 mm interposition graft and closure of patent foramen ovale at the same time.  He has been doing well until a year ago when he started to have short term memory issue progressive severe enough to be let go from his job as a . Unclear if he sought medical attention at that time. He presented to [facility] for dizziness with blurry vision along with racing heart beats. Pt was found to be in atrial flutter with 2:1 AV conduction along with intermittent 1:1 AV conduction.  Head CT showed chronic right parietal infarct. Echo showed ejection fraction of 40-45% with apical hypokinesis, including the distal anterior, anterolateral, and inferolateral walls. Nuc stress showed a small area of nontransmural infarction at the apex associated with a medium sized area of noel-infarct ischemia involving the mid inferior, mid inferolateral, apical inferior, apical septal, and apex wall segment . During hsp there was mention of ?AF but not documented on ECGs.  Angiogram was not performed presumably from ARF with creat of 2.13. unclear of baseline as last creat was normal 5 years ago. Most recent creat was 1.37. pt has a solitary kidney. He donated his kidney to his mom.     Atrial flutter rate is quite slow at 380 ms (160 bpm) with negative flutter waves in the inferior leads and positive in V1 suggestive to typical CTI dependent.     Short term memories are getting worse. His wife has to remind him quite often of events  happened 30 min before. Neurol thinks that CVA was the cause.    Presumably new onset of cardiomyopathy with wma noted both on echo and nuc c/w coronary ischemia. Now that renal function has somewhat normalized recommending coronary angiogram. Discussed risks of the procedure including contrast induced nephropathy and vascular injury. Will hold Eliquis 2 days before procedure.     I reviewed all avail ECGs on [facility] and found no evidence of AF but AFL only. CVA appears to be embolic and likely from current atrial flutter of unknown duration. Rate is currently controlled at this point but given his young age it is prudent to restore and maintain sinus rhythm. The best option would be an ablation in 4 weeks provided that Eliquis can be resumed after angiogram. Important to be on Eliquis for 3 weeks at least before ablation. I went over the risks of the procedure including vascular injury and cva.     The longitudinal plan of care of the diagnosis(es)/condition (s) as documented were addressed during this visit. Due to the added complexity in care, I will continue to support the patient in the subsequent management and with ongoing continuity of care.       Review of Systems     12-pt ROS is negative except for as noted in the HPI.          Physical Exam     Vitals: There were no vitals taken for this visit.  Wt Readings from Last 10 Encounters:   05/29/25 (!) 143.7 kg (316 lb 11.2 oz)   05/02/25 (!) 150.6 kg (332 lb)   03/04/20 (!) 166 kg (366 lb)   01/08/20 (!) 156.9 kg (346 lb)   12/27/19 (!) 153.3 kg (338 lb)   12/02/19 (!) 153.3 kg (338 lb)   11/25/19 (!) 153.3 kg (338 lb)   11/15/18 (!) 155.8 kg (343 lb 6.4 oz)   01/15/18 (!) 158.8 kg (350 lb 3.2 oz)   05/08/17 (!) 155.7 kg (343 lb 3.2 oz)       Constitutional:  Patient is pleasant, alert, cooperative, and in NAD.  HEENT:  NCAT. PERRLA. EOM's intact.   Neck:  CVP appears normal. No carotid bruits.   Pulmonary: Normal respiratory effort. CTAB.   Cardiac: RRR, normal  S1/S2, no S3/S4, no murmur or rub.   Abdomen:  Non-tender abdomen, no hepatosplenomegaly appreciated.   Vascular: Pulses in the upper and lower extremities are 2+ and equal bilaterally.  Extremities: No edema, erythema, cyanosis or tenderness appreciated.  Skin:  No rashes or lesions appreciated.   Neurological:  No gross motor or sensory deficits.   Psych: Appropriate affect.          Data   Labs reviewed:  Recent Labs   Lab Test 05/29/25  1614 04/29/25  1942 04/29/25  1723 01/08/20  1148 01/15/18  1717 05/08/17  1136   LDL  --  22  --  80  --  31   HDL  --  27*  --  35*  --  30*   NHDL  --  50  --  109  --  46   CHOL  --  77  --  144  --  76   TRIG  --  140  --  147  --  73   TSH 1.33  --  2.14 2.40   < >  --    NTBNP 816*  --   --   --   --   --     < > = values in this interval not displayed.       Lab Results   Component Value Date    WBC 7.7 05/30/2025    WBC 7.5 01/08/2020    RBC 4.98 05/30/2025    RBC 5.04 01/08/2020    HGB 14.9 05/30/2025    HGB 14.6 01/08/2020    HCT 45.0 05/30/2025    HCT 45.8 01/08/2020    MCV 90 05/30/2025    MCV 91 01/08/2020    MCH 29.9 05/30/2025    MCH 29.0 01/08/2020    MCHC 33.1 05/30/2025    MCHC 31.9 01/08/2020    RDW 13.3 05/30/2025    RDW 13.1 01/08/2020     05/30/2025     01/08/2020       Lab Results   Component Value Date     05/30/2025     01/08/2020    POTASSIUM 4.3 05/30/2025    POTASSIUM 4.5 01/08/2020    CHLORIDE 107 05/30/2025    CHLORIDE 106 01/08/2020    CO2 25 05/30/2025    CO2 26 01/08/2020    ANIONGAP 10 05/30/2025    ANIONGAP 8 01/08/2020    GLC 99 05/30/2025     (H) 01/08/2020    BUN 20.6 05/30/2025    BUN 17 01/08/2020    CR 1.37 (H) 05/30/2025    CR 1.210 04/01/2020    GFRESTIMATED 59 (L) 05/30/2025    GFRESTIMATED 72 01/08/2020    GFRESTBLACK 84 01/08/2020    CATY 9.3 05/30/2025    CATY 8.7 01/08/2020      Lab Results   Component Value Date    AST 25 04/29/2025    AST 19 04/01/2020    ALT 48 04/29/2025    ALT 33 04/01/2020        Lab Results   Component Value Date    A1C 6.1 (H) 04/29/2025    A1C 5.5 03/18/2016       Lab Results   Component Value Date    INR 1.00 03/28/2016    INR 1.27 (H) 02/17/2015            Problem List     Patient Active Problem List   Diagnosis     history of Thoracic aortic aneurysm without rupture - 4.7 cm on 9/8/2014 increased to 5.2 cm 1/2015 - repaired with gortex graft 2/17/2015      Kidney donor     Essential hypertension with goal blood pressure less than 140/90     Chronic gout of multiple sites, unspecified cause - sees Dr. Hope [facility]      Hypothyroidism, unspecified type     Vertigo     Tobacco use disorder- pipe/cigar -started smoking cigars/pipe again 6/2015     Atypical chest pain     Alcohol abuse - cut down  s/p TAA repair 2/27/2015 - down to 2-3 beers/week     S/P ascending aortic aneurysm repair     Fever     CARDIOVASCULAR SCREENING; LDL GOAL LESS THAN 100- secondary to hx of TAA repair     SOB (shortness of breath)     BERNADETTE (obstructive sleep apnea)     Pain in joint, ankle and foot     Arthralgia of lower leg     Erectile dysfunction due to diseases classified elsewhere     Morbid (severe) obesity due to excess calories (H)     Umbilical hernia without obstruction and without gangrene     Palpitations     Confusion     Chest discomfort     AMAYA (acute kidney injury)     Atrial fibrillation with RVR (H)     Syncope, unspecified syncope type     Cerebrovascular accident (CVA), unspecified mechanism (H)     Orthostatic lightheadedness     Orthostatic syncope            Medications     Current Outpatient Medications   Medication Sig Dispense Refill     acetaminophen (TYLENOL) 500 MG tablet Take 500 mg by mouth every 6 hours as needed for mild pain       allopurinol (ZYLOPRIM) 300 MG tablet Take 300 mg by mouth daily.       apixaban ANTICOAGULANT (ELIQUIS) 5 MG tablet Take 1 tablet (5 mg) by mouth 2 times daily. 60 tablet 0     carvedilol (COREG) 25 MG tablet Take  1 tablet (25 mg) by mouth 2 times daily (with meals). 60 tablet 0     digoxin (LANOXIN) 125 MCG tablet Take 1 tablet (125 mcg) by mouth daily. 30 tablet 0     levothyroxine (SYNTHROID/LEVOTHROID) 75 MCG tablet Take 1 tablet (75 mcg) by mouth daily 90 tablet 3     sildenafil (VIAGRA) 50 MG tablet Take 1 tablet (50 mg) by mouth daily as needed (for erectile dysfunction) 30 min to 4 hrs before sex. Do not use with nitroglycerin, terazosin or doxazosin. 12 tablet 3     order for DME Equipment being ordered: RESMED AIRSENSE 10 WITH HH AND RESMED AIRFIT P10 PILLOW MASK.              Past Medical History     Past Medical History:   Diagnosis Date     Gout      Hyperlipidemia      Hypertension      Hypothyroidism      Kidney donor 1992     donated Left kidney to mother in 1992     Morbid (severe) obesity due to excess calories (H)     comorbid w/: Htn, Erectile dysfunction, Swelling of legs, Excessively sleep during the day, Awaken from sleep to catch breath, Shortness of Breath with activity, Loud snoring, Stop breathing while asleep.      Thoracic aortic aneurysm without rupture     4.7 cm - s/p repair 2/2015     Past Surgical History:   Procedure Laterality Date     ARTHROSCOPY KNEE Right 2005      COLONOSCOPY N/A 1/24/2017    Procedure: COLONOSCOPY;  Surgeon: Maikol Faulkner MD;  Location: [facility]     REPAIR ANEURYSM ASCENDING AORTA N/A 2/17/2015    Procedure: REPAIR ANEURYSM ASCENDING AORTA;  Surgeon: Monae Lechuga MD;  Location: [facility] OR     REPAIR PATENT FORAMEN OVALE N/A 2/17/2015    Procedure: REPAIR PATENT FORAMEN OVALE;  Surgeon: Monae Lechuga MD;  Location: [facility] OR     s/p left nephrectomy - donor to mother  1992     TONSILLECTOMY  age 3      Family History   Problem Relation Age of Onset     Hypertension Mother      Genitourinary Problems Mother         renal failure - lived for 17 years after transplant from pt      Cardiovascular Father         MI at age 59 - heavy smoker      Sleep Apnea Father       Cardiovascular Paternal Grandfather 62        TAA - rupture/      Hypertension Sister      Social History     Socioeconomic History     Marital status:      Spouse name: Dacia Marquis     Number of children: 1     Years of education: 12     Highest education level: Not on file   Occupational History     Occupation: local seasonal [employer]      Comment: [employer]    Tobacco Use     Smoking status: Some Days     Types: Pipe, Cigars     Smokeless tobacco: Never     Tobacco comments:     strongly encourage full cessation secondary to TAA. Occ. cigar    Substance and Sexual Activity     Alcohol use: Yes     Alcohol/week: 0.0 standard drinks of alcohol     Comment: couple of beers each week     Drug use: No     Comment: used to smoke pot in the [place]      Sexual activity: Yes     Partners: Female     Comment:     Other Topics Concern     Parent/sibling w/ CABG, MI or angioplasty before 65F 55M? No      Service Not Asked     Blood Transfusions Not Asked     Caffeine Concern No     Comment: 0-1 a day     Occupational Exposure Not Asked     Hobby Hazards Not Asked     Sleep Concern Not Asked     Stress Concern Not Asked     Weight Concern Not Asked     Special Diet Not Asked     Back Care Not Asked     Exercise No     Bike Helmet Not Asked     Seat Belt Yes     Self-Exams Not Asked   Social History Narrative    SonPrudencio - : 10/25/2003.              Social Drivers of Health     Financial Resource Strain: Low Risk  (2025)    Financial Resource Strain      Within the past 12 months, have you or your family members you live with been unable to get utilities (heat, electricity) when it was really needed?: No   Food Insecurity: Low Risk  (2025)    Food Insecurity      Within the past 12 months, did you worry that your food would run out before you got money to buy more?: No      Within the past 12 months, did the food you bought just not last and you didn t have  money to get more?: No   Transportation Needs: Low Risk  (5/29/2025)    Transportation Needs      Within the past 12 months, has lack of transportation kept you from medical appointments, getting your medicines, non-medical meetings or appointments, work, or from getting things that you need?: No   Physical Activity: Not on file   Stress: Not on file   Social Connections: Not on file   Interpersonal Safety: Low Risk  (5/6/2025)    Interpersonal Safety      Do you feel physically and emotionally safe where you currently live?: Yes      Within the past 12 months, have you been hit, slapped, kicked or otherwise physically hurt by someone?: No      Within the past 12 months, have you been humiliated or emotionally abused in other ways by your partner or ex-partner?: No   Housing Stability: High Risk (5/29/2025)    Housing Stability      Do you have housing? : No      Are you worried about losing your housing?: No            Allergies   Thalidomide    Today's clinic visit entailed:  The following tests were independently interpreted by me as noted in my documentation: ecg, stress test, echo  30 minutes spent by me on the date of the encounter doing chart review, history and exam, documentation and further activities per the note  Provider  Link to [facility] Help Grid     The level of medical decision making during this visit was of moderate complexity.     Thank you for allowing me to participate in the care of your patient.      Sincerely,     Brad Sun MD     [facility]  cc:   Bety Pollard NP  [address] [id number]

## 2025-06-04 NOTE — PROGRESS NOTES
Electrophysiology Clinic Progress Note    Michael Esteban MRN# [id number]   YOB: 1965 Age: 60 year old     Primary cardiologist:          Assessment and Plan   60-year-old gentleman with history of ascending aortic aneurysm repair done in 02/2015  using a Gelweave 30 mm interposition graft and closure of patent foramen ovale at the same time.  He has been doing well until a year ago when he started to have short term memory issue progressive severe enough to be let go from his job as a . Unclear if he sought medical attention at that time. He presented to [facility] for dizziness with blurry vision along with racing heart beats. Pt was found to be in atrial flutter with 2:1 AV conduction along with intermittent 1:1 AV conduction.  Head CT showed chronic right parietal infarct. Echo showed ejection fraction of 40-45% with apical hypokinesis, including the distal anterior, anterolateral, and inferolateral walls. Nuc stress showed a small area of nontransmural infarction at the apex associated with a medium sized area of noel-infarct ischemia involving the mid inferior, mid inferolateral, apical inferior, apical septal, and apex wall segment . During hsp there was mention of ?AF but not documented on ECGs.  Angiogram was not performed presumably from ARF with creat of 2.13. unclear of baseline as last creat was normal 5 years ago. Most recent creat was 1.37. pt has a solitary kidney. He donated his kidney to his mom.     Atrial flutter rate is quite slow at 380 ms (160 bpm) with negative flutter waves in the inferior leads and positive in V1 suggestive to typical CTI dependent.     Short term memories are getting worse. His wife has to remind him quite often of events happened 30 min before. Neurol thinks that CVA was the cause.    Presumably new onset of cardiomyopathy with wma noted both on echo and nuc c/w coronary ischemia. Now that renal function has somewhat normalized recommending  coronary angiogram. Discussed risks of the procedure including contrast induced nephropathy and vascular injury. Will hold Eliquis 2 days before procedure.     I reviewed all avail ECGs on [facility] and found no evidence of AF but AFL only. CVA appears to be embolic and likely from current atrial flutter of unknown duration. Rate is currently controlled at this point but given his young age it is prudent to restore and maintain sinus rhythm. The best option would be an ablation in 4 weeks provided that Eliquis can be resumed after angiogram. Important to be on Eliquis for 3 weeks at least before ablation. I went over the risks of the procedure including vascular injury and cva.     The longitudinal plan of care of the diagnosis(es)/condition (s) as documented were addressed during this visit. Due to the added complexity in care, I will continue to support the patient in the subsequent management and with ongoing continuity of care.       Review of Systems     12-pt ROS is negative except for as noted in the HPI.          Physical Exam     Vitals: There were no vitals taken for this visit.  Wt Readings from Last 10 Encounters:   05/29/25 (!) 143.7 kg (316 lb 11.2 oz)   05/02/25 (!) 150.6 kg (332 lb)   03/04/20 (!) 166 kg (366 lb)   01/08/20 (!) 156.9 kg (346 lb)   12/27/19 (!) 153.3 kg (338 lb)   12/02/19 (!) 153.3 kg (338 lb)   11/25/19 (!) 153.3 kg (338 lb)   11/15/18 (!) 155.8 kg (343 lb 6.4 oz)   01/15/18 (!) 158.8 kg (350 lb 3.2 oz)   05/08/17 (!) 155.7 kg (343 lb 3.2 oz)       Constitutional:  Patient is pleasant, alert, cooperative, and in NAD.  HEENT:  NCAT. PERRLA. EOM's intact.   Neck:  CVP appears normal. No carotid bruits.   Pulmonary: Normal respiratory effort. CTAB.   Cardiac: RRR, normal S1/S2, no S3/S4, no murmur or rub.   Abdomen:  Non-tender abdomen, no hepatosplenomegaly appreciated.   Vascular: Pulses in the upper and lower extremities are 2+ and equal bilaterally.  Extremities: No edema, erythema,  cyanosis or tenderness appreciated.  Skin:  No rashes or lesions appreciated.   Neurological:  No gross motor or sensory deficits.   Psych: Appropriate affect.          Data   Labs reviewed:  Recent Labs   Lab Test 05/29/25  1614 04/29/25  1942 04/29/25  1723 01/08/20  1148 01/15/18  1717 05/08/17  1136   LDL  --  22  --  80  --  31   HDL  --  27*  --  35*  --  30*   NHDL  --  50  --  109  --  46   CHOL  --  77  --  144  --  76   TRIG  --  140  --  147  --  73   TSH 1.33  --  2.14 2.40   < >  --    NTBNP 816*  --   --   --   --   --     < > = values in this interval not displayed.       Lab Results   Component Value Date    WBC 7.7 05/30/2025    WBC 7.5 01/08/2020    RBC 4.98 05/30/2025    RBC 5.04 01/08/2020    HGB 14.9 05/30/2025    HGB 14.6 01/08/2020    HCT 45.0 05/30/2025    HCT 45.8 01/08/2020    MCV 90 05/30/2025    MCV 91 01/08/2020    MCH 29.9 05/30/2025    MCH 29.0 01/08/2020    MCHC 33.1 05/30/2025    MCHC 31.9 01/08/2020    RDW 13.3 05/30/2025    RDW 13.1 01/08/2020     05/30/2025     01/08/2020       Lab Results   Component Value Date     05/30/2025     01/08/2020    POTASSIUM 4.3 05/30/2025    POTASSIUM 4.5 01/08/2020    CHLORIDE 107 05/30/2025    CHLORIDE 106 01/08/2020    CO2 25 05/30/2025    CO2 26 01/08/2020    ANIONGAP 10 05/30/2025    ANIONGAP 8 01/08/2020    GLC 99 05/30/2025     (H) 01/08/2020    BUN 20.6 05/30/2025    BUN 17 01/08/2020    CR 1.37 (H) 05/30/2025    CR 1.210 04/01/2020    GFRESTIMATED 59 (L) 05/30/2025    GFRESTIMATED 72 01/08/2020    GFRESTBLACK 84 01/08/2020    CATY 9.3 05/30/2025    CATY 8.7 01/08/2020      Lab Results   Component Value Date    AST 25 04/29/2025    AST 19 04/01/2020    ALT 48 04/29/2025    ALT 33 04/01/2020       Lab Results   Component Value Date    A1C 6.1 (H) 04/29/2025    A1C 5.5 03/18/2016       Lab Results   Component Value Date    INR 1.00 03/28/2016    INR 1.27 (H) 02/17/2015            Problem List     Patient Active  Problem List   Diagnosis    history of Thoracic aortic aneurysm without rupture - 4.7 cm on 9/8/2014 increased to 5.2 cm 1/2015 - repaired with gortex graft 2/17/2015     Kidney donor    Essential hypertension with goal blood pressure less than 140/90    Chronic gout of multiple sites, unspecified cause - sees Dr. Hope [facility]     Hypothyroidism, unspecified type    Vertigo    Tobacco use disorder- pipe/cigar -started smoking cigars/pipe again 6/2015    Atypical chest pain    Alcohol abuse - cut down  s/p TAA repair 2/27/2015 - down to 2-3 beers/week    S/P ascending aortic aneurysm repair    Fever    CARDIOVASCULAR SCREENING; LDL GOAL LESS THAN 100- secondary to hx of TAA repair    SOB (shortness of breath)    BERNADETTE (obstructive sleep apnea)    Pain in joint, ankle and foot    Arthralgia of lower leg    Erectile dysfunction due to diseases classified elsewhere    Morbid (severe) obesity due to excess calories (H)    Umbilical hernia without obstruction and without gangrene    Palpitations    Confusion    Chest discomfort    AMAYA (acute kidney injury)    Atrial fibrillation with RVR (H)    Syncope, unspecified syncope type    Cerebrovascular accident (CVA), unspecified mechanism (H)    Orthostatic lightheadedness    Orthostatic syncope            Medications     Current Outpatient Medications   Medication Sig Dispense Refill    acetaminophen (TYLENOL) 500 MG tablet Take 500 mg by mouth every 6 hours as needed for mild pain      allopurinol (ZYLOPRIM) 300 MG tablet Take 300 mg by mouth daily.      apixaban ANTICOAGULANT (ELIQUIS) 5 MG tablet Take 1 tablet (5 mg) by mouth 2 times daily. 60 tablet 0    carvedilol (COREG) 25 MG tablet Take 1 tablet (25 mg) by mouth 2 times daily (with meals). 60 tablet 0    digoxin (LANOXIN) 125 MCG tablet Take 1 tablet (125 mcg) by mouth daily. 30 tablet 0    levothyroxine (SYNTHROID/LEVOTHROID) 75 MCG tablet Take 1 tablet (75 mcg) by mouth daily 90  tablet 3    sildenafil (VIAGRA) 50 MG tablet Take 1 tablet (50 mg) by mouth daily as needed (for erectile dysfunction) 30 min to 4 hrs before sex. Do not use with nitroglycerin, terazosin or doxazosin. 12 tablet 3    order for DME Equipment being ordered: RESMED AIRSENSE 10 WITH HH AND RESMED AIRFIT P10 PILLOW MASK.              Past Medical History     Past Medical History:   Diagnosis Date    Gout     Hyperlipidemia     Hypertension     Hypothyroidism     Kidney donor      donated Left kidney to mother in     Morbid (severe) obesity due to excess calories (H)     comorbid w/: Htn, Erectile dysfunction, Swelling of legs, Excessively sleep during the day, Awaken from sleep to catch breath, Shortness of Breath with activity, Loud snoring, Stop breathing while asleep.     Thoracic aortic aneurysm without rupture     4.7 cm - s/p repair 2015     Past Surgical History:   Procedure Laterality Date    ARTHROSCOPY KNEE Right      COLONOSCOPY N/A 2017    Procedure: COLONOSCOPY;  Surgeon: Maikol Faulkner MD;  Location: [facility]    REPAIR ANEURYSM ASCENDING AORTA N/A 2015    Procedure: REPAIR ANEURYSM ASCENDING AORTA;  Surgeon: Monae Lechuga MD;  Location: [facility]    REPAIR PATENT FORAMEN OVALE N/A 2015    Procedure: REPAIR PATENT FORAMEN OVALE;  Surgeon: Monae Lechuga MD;  Location: [facility] OR    s/p left nephrectomy - donor to mother      TONSILLECTOMY  age 3      Family History   Problem Relation Age of Onset    Hypertension Mother     Genitourinary Problems Mother         renal failure - lived for 17 years after transplant from pt     Cardiovascular Father         MI at age 59 - heavy smoker     Sleep Apnea Father     Cardiovascular Paternal Grandfather 62        TAA - rupture/     Hypertension Sister      Social History     Socioeconomic History    Marital status:      Spouse name: Dacia Marquis    Number of children: 1    Years of education: 12    Highest education level:  Not on file   Occupational History    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 Occ. cigar    Substance and Sexual Activity    Alcohol use: Yes     Alcohol/week: 0.0 standard drinks of alcohol     Comment: couple of beers each week    Drug use: No     Comment: used to smoke pot in the [place]     Sexual activity: Yes     Partners: Female     Comment:     Other Topics Concern    Parent/sibling w/ CABG, MI or angioplasty before 65F 55M? No     Service Not Asked    Blood Transfusions Not Asked    Caffeine Concern No     Comment: 0-1 a day    Occupational Exposure Not Asked    Hobby Hazards Not Asked    Sleep Concern Not Asked    Stress Concern Not Asked    Weight Concern Not Asked    Special Diet Not Asked    Back Care Not Asked    Exercise No    Bike Helmet Not Asked    Seat Belt Yes    Self-Exams Not Asked   Social History Narrative    SonPrudencio - : 10/25/2003.              Social Drivers of Health     Financial Resource Strain: Low Risk  (2025)    Financial Resource Strain     Within the past 12 months, have you or your family members you live with been unable to get utilities (heat, electricity) when it was really needed?: No   Food Insecurity: Low Risk  (2025)    Food Insecurity     Within the past 12 months, did you worry that your food would run out before you got money to buy more?: No     Within the past 12 months, did the food you bought just not last and you didn t have money to get more?: No   Transportation Needs: Low Risk  (2025)    Transportation Needs     Within the past 12 months, has lack of transportation kept you from medical appointments, getting your medicines, non-medical meetings or appointments, work, or from getting things that you need?: No    Physical Activity: Not on file   Stress: Not on file   Social Connections: Not on file   Interpersonal Safety: Low Risk  (5/6/2025)    Interpersonal Safety     Do you feel physically and emotionally safe where you currently live?: Yes     Within the past 12 months, have you been hit, slapped, kicked or otherwise physically hurt by someone?: No     Within the past 12 months, have you been humiliated or emotionally abused in other ways by your partner or ex-partner?: No   Housing Stability: High Risk (5/29/2025)    Housing Stability     Do you have housing? : No     Are you worried about losing your housing?: No            Allergies   Thalidomide    Today's clinic visit entailed:  The following tests were independently interpreted by me as noted in my documentation: ecg, stress test, echo  30 minutes spent by me on the date of the encounter doing chart review, history and exam, documentation and further activities per the note  Provider  Link to [facility] Help Grid     The level of medical decision making during this visit was of moderate complexity.

## 2025-06-09 ENCOUNTER — TELEPHONE (OUTPATIENT)
Dept: CARDIOLOGY | Facility: CLINIC | Age: 60
End: 2025-06-09
Payer: COMMERCIAL

## 2025-06-09 ENCOUNTER — THERAPY VISIT (OUTPATIENT)
Dept: OCCUPATIONAL THERAPY | Facility: CLINIC | Age: 60
End: 2025-06-09
Attending: STUDENT IN AN ORGANIZED HEALTH CARE EDUCATION/TRAINING PROGRAM
Payer: COMMERCIAL

## 2025-06-09 DIAGNOSIS — I48.91 ATRIAL FIBRILLATION WITH RVR (H): ICD-10-CM

## 2025-06-09 DIAGNOSIS — I48.3 TYPICAL ATRIAL FLUTTER (H): ICD-10-CM

## 2025-06-09 DIAGNOSIS — R41.89 ALTERATION IN COGNITION: ICD-10-CM

## 2025-06-09 DIAGNOSIS — I63.9 CEREBROVASCULAR ACCIDENT (CVA), UNSPECIFIED MECHANISM (H): Primary | ICD-10-CM

## 2025-06-09 DIAGNOSIS — I25.5 ISCHEMIC CARDIOMYOPATHY: Primary | ICD-10-CM

## 2025-06-09 PROCEDURE — 97530 THERAPEUTIC ACTIVITIES: CPT | Mod: GO

## 2025-06-09 RX ORDER — ASPIRIN 81 MG/1
243 TABLET, CHEWABLE ORAL ONCE
Status: CANCELLED | OUTPATIENT
Start: 2025-06-09

## 2025-06-09 RX ORDER — POTASSIUM CHLORIDE 1500 MG/1
20 TABLET, EXTENDED RELEASE ORAL
Status: CANCELLED | OUTPATIENT
Start: 2025-06-09

## 2025-06-09 RX ORDER — ASPIRIN 325 MG
325 TABLET ORAL ONCE
Status: CANCELLED | OUTPATIENT
Start: 2025-06-09 | End: 2025-06-09

## 2025-06-09 RX ORDER — SODIUM CHLORIDE 9 MG/ML
INJECTION, SOLUTION INTRAVENOUS CONTINUOUS
Status: CANCELLED | OUTPATIENT
Start: 2025-06-09

## 2025-06-09 RX ORDER — LIDOCAINE 40 MG/G
CREAM TOPICAL
Status: CANCELLED | OUTPATIENT
Start: 2025-06-09

## 2025-06-09 NOTE — TELEPHONE ENCOUNTER
Coronary angiogram/PCI/Right Heart Cath prep instructions.     Patient is scheduled for a Coronary Angiogram at [facility] - [address] [place] [address] - Main Entrance of the Hospital on Tuesday, June 10th, 2025.  Check in time is at 10:00 am and procedure to follow.    Performing Cardiologist: Dr. Gómez    Patient instructed to remain NPO for solid foods 8 hours prior to arrival and may have clear liquids up to 2 hours prior to arrival.    Patient does not require extra fluids prior to procedure.    Patient is not diabetic.    Patient taking Eliquis- was advised to hold 2 days prior to procedure. Has held sinc 6/8 and will continue to do so.   Hold 6/8 and 6/9 for 6/10 procedure    Patient is not on diuretics.     Patient is not currently taking ASA and has been advised to take one 325 mg tablet the day prior and morning of the procedure.    Pt is not on a SGLT2 inhibitor.    Pt is not on a GLP-1 Agonist    Patient advised to hold sildenafil prior to procedure at this point.     Patient advised to take their other daily medications the morning of the procedure with small sips of water.     Patient advised to shower the night before and morning of their procedure with regular soap.    Verified patient does not have a contrast allergy.    Verified patient has someone available to drive them home from the hospital and can stay with them for 24 hours after the procedure.     Patient advised they will have bedrest post procedure.  Length of time is 2-6 hours and dependent on access site used for procedure.  This bedrest is to allow proper clotting of the access site to prevent bleeding.    Patient advised to notify care team with any new COVID like symptoms prior to procedure. Day of procedure phone number: [place] at [phone]    Patient is aware of visitor policy.    Patient expresses understanding of above instructions and denies further questions at this time.    Nuzhat  HEDY Metcalf  [facility]

## 2025-06-09 NOTE — TELEPHONE ENCOUNTER
----- Message from Luciana FLOYD sent at 6/6/2025  3:48 PM CDT -----  Regarding: ANGIO - 6/10/25  Angiogram Orders    Location: [facility]    Procedure: Coronary Angiogram    Diagnosis: ICM    Procedure Date: 6/10/25    Patient Arrival Time: 10:00    Procedure Time: 1200 (pending emergency) [place]    Ordering Cardiologist: Dr. Fernandez    Performing Cardiologist: Dr. Gómez    Cardiac Assessment Completed: Yes  Date: 6/4/25  Provider: Rebecca    Pre-Procedure Labs completed: on admit    Post Procedure JEANE appointment scheduled: No    Patient Diabetic on Meds/Insulin:  No  If on Metformin/Synjardy, has 2 day post procedure BMP been scheduled: No  (Thursday and Friday procedures should have Monday BMP)  Patient on Coumadin/Warfarin:  No  Patient on Pradaxa/Xarelto/Eliquis:  Yes  Patient on Invokana/Farxiga/Inpefa/Jardiance/Steglatro/Synjardy:  No  Patient on Adlyxin/Bydureon/Byetta/Mounjaro/Ozempic/Rybelsus/Soliquo/Trulicity/Victoza/Wegovy/Zepbound: No    Does Patient have a history of bypass:  No    Appointment was scheduled: Phone    Per Fernandez, hold Eliquis 2 days prior. Pt aware  ----- Message -----  From: Luciana Moran  Sent: 6/6/2025   3:51 PM CDT  To: [facility] Heart Team 3  Subject: ANGIO - 6/10/25                                  Angiogram Orders    Location: [facility]    Procedure: Coronary Angiogram    Diagnosis: ICM    Procedure Date: 6/10/25    Patient Arrival Time: 10:00    Procedure Time: 1200 (pending emergency) [place]    Ordering Cardiologist: Dr. Fernandez    Performing Cardiologist: Dr. Gómez    Cardiac Assessment Completed: Yes  Date: 6/4/25  Provider: Rebecca    Pre-Procedure Labs completed: on admit    Post Procedure JEANE appointment scheduled: No    Patient Diabetic on Meds/Insulin:  No  If on Metformin/Synjardy, has 2 day post procedure BMP been scheduled: No  (Thursday and Friday procedures should have Monday BMP)  Patient on Coumadin/Warfarin:  No  Patient on Pradaxa/Xarelto/Eliquis:  Yes  Patient on  Invokana/Reggiega/Inpefa/Jardiance/Steglatro/Synjardy:  No  Patient on Adlyxin/Bydureon/Byetta/Mounjaro/Ozempic/Rybelsus/Soliquo/Trulicity/Victoza/Wegovy/Zepbound: No    Does Patient have a history of bypass:  No    Appointment was scheduled: Phone

## 2025-06-10 ENCOUNTER — HOSPITAL ENCOUNTER (OUTPATIENT)
Facility: CLINIC | Age: 60
Discharge: HOME | End: 2025-06-10
Attending: INTERNAL MEDICINE | Admitting: INTERNAL MEDICINE
Payer: COMMERCIAL

## 2025-06-10 VITALS
RESPIRATION RATE: 16 BRPM | HEART RATE: 89 BPM | TEMPERATURE: 97 F | OXYGEN SATURATION: 97 % | DIASTOLIC BLOOD PRESSURE: 76 MMHG | SYSTOLIC BLOOD PRESSURE: 126 MMHG

## 2025-06-10 DIAGNOSIS — I48.91 ATRIAL FIBRILLATION WITH RVR (H): ICD-10-CM

## 2025-06-10 DIAGNOSIS — I42.8 OTHER CARDIOMYOPATHY (H): Primary | ICD-10-CM

## 2025-06-10 DIAGNOSIS — I25.5 ISCHEMIC CARDIOMYOPATHY: ICD-10-CM

## 2025-06-10 DIAGNOSIS — I48.3 TYPICAL ATRIAL FLUTTER (H): ICD-10-CM

## 2025-06-10 PROBLEM — Z98.890 STATUS POST CORONARY ANGIOGRAM: Status: ACTIVE | Noted: 2025-06-10

## 2025-06-10 LAB
ANION GAP SERPL CALCULATED.3IONS-SCNC: 13 MMOL/L (ref 7–15)
APTT PPP: 29 SECONDS (ref 22–38)
ATRIAL RATE - MUSE: 89 BPM
BUN SERPL-MCNC: 16 MG/DL (ref 8–23)
CALCIUM SERPL-MCNC: 9.3 MG/DL (ref 8.8–10.4)
CHLORIDE SERPL-SCNC: 100 MMOL/L (ref 98–107)
CREAT SERPL-MCNC: 1.56 MG/DL (ref 0.67–1.17)
DIASTOLIC BLOOD PRESSURE - MUSE: NORMAL MMHG
EGFRCR SERPLBLD CKD-EPI 2021: 50 ML/MIN/1.73M2
ERYTHROCYTE [DISTWIDTH] IN BLOOD BY AUTOMATED COUNT: 13.5 % (ref 10–15)
GLUCOSE SERPL-MCNC: 112 MG/DL (ref 70–99)
HCO3 SERPL-SCNC: 26 MMOL/L (ref 22–29)
HCT VFR BLD AUTO: 42.6 % (ref 40–53)
HGB BLD-MCNC: 13.8 G/DL (ref 13.3–17.7)
INR PPP: 1.09 (ref 0.85–1.15)
INTERPRETATION ECG - MUSE: NORMAL
MCH RBC QN AUTO: 29.4 PG (ref 26.5–33)
MCHC RBC AUTO-ENTMCNC: 32.4 G/DL (ref 31.5–36.5)
MCV RBC AUTO: 91 FL (ref 78–100)
P AXIS - MUSE: NORMAL DEGREES
PLATELET # BLD AUTO: 206 10E3/UL (ref 150–450)
POTASSIUM SERPL-SCNC: 4.4 MMOL/L (ref 3.4–5.3)
PR INTERVAL - MUSE: 238 MS
PROTHROMBIN TIME: 14.2 SECONDS (ref 11.8–14.8)
QRS DURATION - MUSE: 92 MS
QT - MUSE: 338 MS
QTC - MUSE: 411 MS
R AXIS - MUSE: 40 DEGREES
RBC # BLD AUTO: 4.7 10E6/UL (ref 4.4–5.9)
SODIUM SERPL-SCNC: 139 MMOL/L (ref 135–145)
SYSTOLIC BLOOD PRESSURE - MUSE: NORMAL MMHG
T AXIS - MUSE: 72 DEGREES
VENTRICULAR RATE- MUSE: 89 BPM
WBC # BLD AUTO: 9.6 10E3/UL (ref 4–11)

## 2025-06-10 PROCEDURE — 85014 HEMATOCRIT: CPT | Performed by: INTERNAL MEDICINE

## 2025-06-10 PROCEDURE — C1894 INTRO/SHEATH, NON-LASER: HCPCS | Performed by: INTERNAL MEDICINE

## 2025-06-10 PROCEDURE — 85610 PROTHROMBIN TIME: CPT | Performed by: INTERNAL MEDICINE

## 2025-06-10 PROCEDURE — 99152 MOD SED SAME PHYS/QHP 5/>YRS: CPT | Performed by: INTERNAL MEDICINE

## 2025-06-10 PROCEDURE — 80048 BASIC METABOLIC PNL TOTAL CA: CPT | Performed by: INTERNAL MEDICINE

## 2025-06-10 PROCEDURE — 93454 CORONARY ARTERY ANGIO S&I: CPT | Mod: 26 | Performed by: INTERNAL MEDICINE

## 2025-06-10 PROCEDURE — 250N000011 HC RX IP 250 OP 636: Performed by: INTERNAL MEDICINE

## 2025-06-10 PROCEDURE — 85730 THROMBOPLASTIN TIME PARTIAL: CPT | Performed by: INTERNAL MEDICINE

## 2025-06-10 PROCEDURE — 250N000009 HC RX 250: Mod: JW | Performed by: INTERNAL MEDICINE

## 2025-06-10 PROCEDURE — 36415 COLL VENOUS BLD VENIPUNCTURE: CPT | Performed by: INTERNAL MEDICINE

## 2025-06-10 PROCEDURE — 272N000001 HC OR GENERAL SUPPLY STERILE: Performed by: INTERNAL MEDICINE

## 2025-06-10 PROCEDURE — 93454 CORONARY ARTERY ANGIO S&I: CPT | Performed by: INTERNAL MEDICINE

## 2025-06-10 RX ORDER — NALOXONE HYDROCHLORIDE 0.4 MG/ML
0.2 INJECTION, SOLUTION INTRAMUSCULAR; INTRAVENOUS; SUBCUTANEOUS
Status: DISCONTINUED | OUTPATIENT
Start: 2025-06-10 | End: 2025-06-10 | Stop reason: HOSPADM

## 2025-06-10 RX ORDER — VERAPAMIL HYDROCHLORIDE 2.5 MG/ML
INJECTION INTRAVENOUS
Status: COMPLETED | OUTPATIENT
Start: 2025-06-10 | End: 2025-06-10

## 2025-06-10 RX ORDER — IOPAMIDOL 755 MG/ML
INJECTION, SOLUTION INTRAVASCULAR
Status: COMPLETED | OUTPATIENT
Start: 2025-06-10 | End: 2025-06-10

## 2025-06-10 RX ORDER — ATROPINE SULFATE 0.1 MG/ML
0.5 INJECTION INTRAVENOUS
Status: DISCONTINUED | OUTPATIENT
Start: 2025-06-10 | End: 2025-06-10 | Stop reason: HOSPADM

## 2025-06-10 RX ORDER — HEPARIN SODIUM 1000 [USP'U]/ML
INJECTION, SOLUTION INTRAVENOUS; SUBCUTANEOUS
Status: COMPLETED | OUTPATIENT
Start: 2025-06-10 | End: 2025-06-10

## 2025-06-10 RX ORDER — SODIUM CHLORIDE 9 MG/ML
INJECTION, SOLUTION INTRAVENOUS CONTINUOUS
Status: DISCONTINUED | OUTPATIENT
Start: 2025-06-10 | End: 2025-06-10 | Stop reason: HOSPADM

## 2025-06-10 RX ORDER — ACETAMINOPHEN 325 MG/1
650 TABLET ORAL EVERY 4 HOURS PRN
Status: DISCONTINUED | OUTPATIENT
Start: 2025-06-10 | End: 2025-06-10 | Stop reason: HOSPADM

## 2025-06-10 RX ORDER — FENTANYL CITRATE 50 UG/ML
25 INJECTION, SOLUTION INTRAMUSCULAR; INTRAVENOUS
Refills: 0 | Status: DISCONTINUED | OUTPATIENT
Start: 2025-06-10 | End: 2025-06-10 | Stop reason: HOSPADM

## 2025-06-10 RX ORDER — LIDOCAINE 40 MG/G
CREAM TOPICAL
Status: DISCONTINUED | OUTPATIENT
Start: 2025-06-10 | End: 2025-06-10 | Stop reason: HOSPADM

## 2025-06-10 RX ORDER — NALOXONE HYDROCHLORIDE 0.4 MG/ML
0.4 INJECTION, SOLUTION INTRAMUSCULAR; INTRAVENOUS; SUBCUTANEOUS
Status: DISCONTINUED | OUTPATIENT
Start: 2025-06-10 | End: 2025-06-10 | Stop reason: HOSPADM

## 2025-06-10 RX ORDER — LIDOCAINE 40 MG/G
CREAM TOPICAL
Status: DISCONTINUED | OUTPATIENT
Start: 2025-06-10 | End: 2025-06-10

## 2025-06-10 RX ORDER — NITROGLYCERIN 5 MG/ML
VIAL (ML) INTRAVENOUS
Status: COMPLETED | OUTPATIENT
Start: 2025-06-10 | End: 2025-06-10

## 2025-06-10 RX ORDER — POTASSIUM CHLORIDE 1500 MG/1
20 TABLET, EXTENDED RELEASE ORAL
Status: DISCONTINUED | OUTPATIENT
Start: 2025-06-10 | End: 2025-06-10 | Stop reason: HOSPADM

## 2025-06-10 RX ORDER — FLUMAZENIL 0.1 MG/ML
0.2 INJECTION, SOLUTION INTRAVENOUS
Status: DISCONTINUED | OUTPATIENT
Start: 2025-06-10 | End: 2025-06-10 | Stop reason: HOSPADM

## 2025-06-10 RX ORDER — ASPIRIN 325 MG
325 TABLET ORAL ONCE
Status: COMPLETED | OUTPATIENT
Start: 2025-06-10 | End: 2025-06-10

## 2025-06-10 RX ORDER — FENTANYL CITRATE 50 UG/ML
INJECTION, SOLUTION INTRAMUSCULAR; INTRAVENOUS
Status: COMPLETED | OUTPATIENT
Start: 2025-06-10 | End: 2025-06-10

## 2025-06-10 RX ORDER — ASPIRIN 81 MG/1
243 TABLET, CHEWABLE ORAL ONCE
Status: COMPLETED | OUTPATIENT
Start: 2025-06-10 | End: 2025-06-10

## 2025-06-10 ASSESSMENT — ACTIVITIES OF DAILY LIVING (ADL)
ADLS_ACUITY_SCORE: 49

## 2025-06-10 NOTE — PROGRESS NOTES
Patient in recovery area following angiogram. Patient is alert when spoken to, denies pain at this time (patient has baseline pain in left elbow that he came in with, no new pain related to procedure). Vital signs are stable. Right radial site is intact with TR band in place. Family at bedside, cardiologist updated family.

## 2025-06-10 NOTE — PRE-PROCEDURE
GENERAL PRE-PROCEDURE:   Procedure:  Coronary angiogram, possible angioplasty  Date/Time:  6/10/2025 12:30 PM    Written consent obtained?: Yes    Risks and benefits: Risks, benefits and alternatives were discussed    Consent given by:  Patient  Patient states understanding of procedure being performed: Yes    Patient's understanding of procedure matches consent: Yes    Procedure consent matches procedure scheduled: Yes    Expected level of sedation:  Moderate  Appropriately NPO:  Yes  ASA Class:  4  Mallampati  :  Grade 1- soft palate, uvula, tonsillar pillars, and posterior pharyngeal wall visible  Lungs:  Lungs clear with good breath sounds bilaterally  Heart:  Normal heart sounds and rate  History & Physical reviewed:  History and physical reviewed and no updates needed  Statement of review:  I have reviewed the lab findings, diagnostic data, medications, and the plan for sedation

## 2025-06-10 NOTE — PROGRESS NOTES
Patient recovered from cardiac catheterization 2.5 hours following procedure. Patient is able to tolerate PO intake. PIV was removed at time of discharge. Patient is able to ambulate with SBA. Right, radial site is clean, dry and intact following ambulation and procedure. CMS is intake. Discharged instructions reviewed with patient and wife. Pt discharged in care of wife.

## 2025-06-11 ENCOUNTER — TELEPHONE (OUTPATIENT)
Dept: CARDIOLOGY | Facility: CLINIC | Age: 60
End: 2025-06-11
Payer: COMMERCIAL

## 2025-06-11 DIAGNOSIS — I48.3 TYPICAL ATRIAL FLUTTER (H): Primary | ICD-10-CM

## 2025-06-11 NOTE — TELEPHONE ENCOUNTER
Spoke with Luciana Moran,  to discuss situation regarding pts ablation that was previously scheduled. Luciana states that procedure was scheduled when she looked this morning and she did not make any changes to it and now order also is no longer active. Unable to figure out what might have happened but new order placed and Luciana will reschedule pt.  HEDY Hung

## 2025-06-11 NOTE — TELEPHONE ENCOUNTER
Pts wife, Dacia called looking to schedule aflutter ablation. Appears pt may have previously been scheduled for 8/8 but was cancelled. Still has appt for  H&P and post op follow up. Will route to scheduling to advise.  HEDY Hung

## 2025-06-16 ENCOUNTER — THERAPY VISIT (OUTPATIENT)
Dept: OCCUPATIONAL THERAPY | Facility: CLINIC | Age: 60
End: 2025-06-16
Attending: STUDENT IN AN ORGANIZED HEALTH CARE EDUCATION/TRAINING PROGRAM
Payer: COMMERCIAL

## 2025-06-16 DIAGNOSIS — I63.9 CEREBROVASCULAR ACCIDENT (CVA), UNSPECIFIED MECHANISM (H): Primary | ICD-10-CM

## 2025-06-16 DIAGNOSIS — R41.89 ALTERATION IN COGNITION: ICD-10-CM

## 2025-06-16 PROCEDURE — 97530 THERAPEUTIC ACTIVITIES: CPT | Mod: GO

## 2025-06-18 LAB
ATRIAL RATE - MUSE: 89 BPM
DIASTOLIC BLOOD PRESSURE - MUSE: NORMAL MMHG
INTERPRETATION ECG - MUSE: NORMAL
P AXIS - MUSE: NORMAL DEGREES
PR INTERVAL - MUSE: 238 MS
QRS DURATION - MUSE: 92 MS
QT - MUSE: 338 MS
QTC - MUSE: 411 MS
R AXIS - MUSE: 40 DEGREES
SYSTOLIC BLOOD PRESSURE - MUSE: NORMAL MMHG
T AXIS - MUSE: 72 DEGREES
VENTRICULAR RATE- MUSE: 89 BPM

## 2025-07-22 NOTE — PROGRESS NOTES
saturnino__________________________________________________________      [facility]   [phone]  __________________________________________________________         History of Present Illness   Chief Complaint: Patient presents with:  Stroke: Hospital follow up.  Patient would like to discuss what they can do to help regain short term memory.  Once in a while gets a quick dizziness and would need to take a rest.        Michael Esteban is a 60 year old male presenting for follow-up for stroke.     He was hospitalized at [facility] on 4/29/2025. Prior to the hospital stay, he had a past medical history of AAA without rupture s/p repair in 2015, like kidney donor s/p nephrectomy, gout, HTN, HLD, BERNADETTE on CPAP, tobacco use disorder.  On PTA ASA 81 mg daily and atorvastatin 40 mg daily..    He presented to the hospital with several days of palpitations/racing heart, blurred vision, and dizziness (lightheadedness) followed by syncopal episode.  Also endorses memory issues/confusion when performing tasks at work leading him to lose his job as a .  Found to be in A-fib with RVR in clinic and sent to [facility] emergency department via EMS.  CT x2 showed a likely chronic right parietal infarct, vessels with mild carotid artery and vertebral artery atherosclerosis without significant stenosis.  He was unable to have MRI due to claustrophobia but was felt low yield due to no new focal deficits.    PTA aspirin was switched to Eliquis for recurrent stroke prevention, atorvastatin was discontinued due to LDL 22. Since being discharged, he endorses some persistent dizziness and short term memory issues. Per wife during visit today he will often repeat questions during the day such as asking what day it is or about what the schedule is for the day/week.  Memory concerns were present for approximately 1.5 years prior to his hospitalization but it does seem as though symptoms had been  "getting worse.  A couple days prior to coming to the hospital he lost his job as he was unable to remember how to use a new application.    He will occasionally feel generalized weakness and as though his knees are \"wobbly\", denies any unilateral weakness.  Occasionally he needs to sit down if he feels lightheaded.  He has plans to have an ablation in a couple weeks.  Denies any racing heart or palpitations currently.  He denies any bleeding issues or black/tarry stools taking his Eliquis.  He has not been checking his blood pressure but is open to monitoring.    Modified Coryell Scale  Score: 3-Moderate disability; requiring some help, but able to walk without assistance    Stroke Evaluation Summarized:  New results resulted and reviewed by me today are in BOLD below.  I personally reviewed the following neuroimaging studies today and the comments above reflect my own personal interpretation of the images: images: CT head, CTA head/neck, and I also showed CT to the patient myself today.    MRI/Head CT MRI Brain: Not performed due to claustrophobia     CT Head 4/29/25: chronic appearing right parietal infarct, no definite acute infarct or hemorrhage   Intracranial Vasculature CTA 4/29/25: no significant stenosis   Cervical Vasculature CTA 4/29/25: mild carotid and vertebral artery atherosclerosis      Echocardiogram 4/30/2025: Decreased LVEF to 40-45%, new apical hypokinesis, stable aortic root and ascending aorta dilation (compared to 2017)   EKG/Telemetry 4/29/2025: Atrial flutter     Other Testing Not Applicable      Labs Lab Results   Component Value Date    LDL 22 04/29/2025    A1C 6.1 (H) 04/29/2025    CTROPT 10 05/29/2025    INR 1.09 06/10/2025    INR 1.00 03/28/2016               Home Medications     Current Outpatient Medications   Medication Sig Dispense Refill    acetaminophen (TYLENOL) 500 MG tablet Take 500 mg by mouth every 6 hours as needed for mild pain      allopurinol (ZYLOPRIM) 300 MG tablet Take " 300 mg by mouth daily.      apixaban ANTICOAGULANT (ELIQUIS) 5 MG tablet Take 1 tablet (5 mg) by mouth 2 times daily. 180 tablet 3    carvedilol (COREG) 25 MG tablet Take 1 tablet (25 mg) by mouth 2 times daily (with meals). 180 tablet 3    digoxin (LANOXIN) 125 MCG tablet Take 1 tablet (125 mcg) by mouth daily. 90 tablet 3    levothyroxine (SYNTHROID/LEVOTHROID) 75 MCG tablet Take 1 tablet (75 mcg) by mouth daily 90 tablet 3    lisinopril (ZESTRIL) 10 MG tablet Take 1 tablet (10 mg) by mouth at bedtime. 90 tablet 3    sildenafil (VIAGRA) 50 MG tablet Take 1 tablet (50 mg) by mouth daily as needed (for erectile dysfunction) 30 min to 4 hrs before sex. Do not use with nitroglycerin, terazosin or doxazosin. 12 tablet 3    order for DME Equipment being ordered: RESMED AIRSENSE 10 WITH HH AND RESMED AIRFIT P10 PILLOW MASK. (Patient not taking: Reported on 7/11/2025)       No current facility-administered medications for this visit.            Physical Examination     Physical Exam    Estimated body mass index is 44.62 kg/m  as calculated from the following:    Height as of 7/11/25: 1.829 m (6').    Weight as of this encounter: 149.2 kg (329 lb).    BP 94/55 (BP Location: Right arm, Patient Position: Sitting)   Pulse 87   Wt (!) 149.2 kg (329 lb)   BMI 44.62 kg/m         General Exam  General: Sitting up in chair in no acute distress  Pulmonary:  no respiratory distress    Neurologic:  Mental Status:  alert, oriented x 3, follows commands, speech clear and fluent, naming and repetition normal, able to spell WORLD backwards and state days of the week backwards, unable to count backwards from 100 by 7s  Cranial Nerves:  visual fields intact, EOMI with normal smooth pursuit, facial sensation intact and symmetric, facial movements symmetric, hearing not formally tested but intact to conversation, palate elevation symmetric and uvula midline, no dysarthria, tongue protrusion midline  Motor:  normal muscle tone and bulk, no  abnormal movements, able to move all limbs spontaneously, strength 5/5 throughout upper and lower extremities other than limited ROM to R shoulder due to chronic rotator cuff injury, no pronator drift  Sensory:  light touch sensation intact and symmetric throughout upper and lower extremities, no extinction on double simultaneous stimulation   Coordination:  normal finger-to-nose and heel-to-shin bilaterally without dysmetria  Station/Gait:  deferred           Screenings and Questionnaires:     Tobacco:    Tobacco Use      Smoking status: Some Days        Types: Pipe, Cigars      Smokeless tobacco: Never      Tobacco comments: strongly encourage full cessation secondary to TAA. Occ. cigar     -Smoking screen: Tobacco cigar use approximately 2-3 cigars/week.  -Alcohol screen: Will have 2-3 beers a couple times per week.  -Drug screen: Endorses marijuana use but no other illicit drug use.  -B symptom screen: Denies fevers, night sweats, or unintentional weight loss    Sleep Apnea:   -Sleep Apnea screen: Has BERNADETTE, was using CPAP but not using it recently.  Denies any intolerance but states it does take him a while to get back into the rhythm/habit of using it    -Depression screen: He denies any feelings of being down/depressed/hopeless however his wife reports that she is concerned with him being depressed and has noticed significant symptoms of anhedonia.  He denies any thoughts of self-harm and declines referral to mental health specialist at this time    Depression:      7/23/2025     9:52 AM 11/15/2018     2:41 PM   PHQ-2 ( 1999 [employer])   Q1: Little interest or pleasure in doing things 0 1   Q2: Feeling down, depressed or hopeless 0 1   PHQ-2 Score 0 2    PHQ-2 Total Score (12-17 Years)- Positive if 3 or more points; Administer PHQ-A if positive  2       Data saved with a previous flowsheet row definition       Stroke Recovery and Risk Factors Questionnaire:      7/18/2025     9:41 AM   Stroke Questionnaire    Residual effects: Any residual effects from stroke? Yes, I do   Residual effects: Most bothersome symptom short term memory loss, A-flutter   Level of independence: Walking Need some help   Level of independence: Eating Independent   Level of independence: Stairs Independent   Level of independence: Dressing Independent   Level of independence: Bathing Independent   Level of independence: Toileting Independent   Incontinence: Bowel? No   Incontinence: Bladder? No   Able to: Use electronics Yes   Able to: Cook or do house chores No   Able to: Do own shopping, including online No   Able to: Manage own finances No   Current therapy: Physical Therapy No   Current therapy: Occupation Therapy Yes   Current therapy: How often OT 2-3 weeks   Current therapy: Speech Therapy No   Current therapy: Other No   Current services: Home Health No, not needed   Medication: How do you take your meds Myself, from a pillbox that someone else sets up   Medication: Do you ever miss or forget meds Rarely   Risk Factor: Checking blood pressure at home No, I have a blood pressure cuff but do not use it   Risk Factor: Why not checking BP at home Other   Risk Factor: Other reason not checking BP don't want to   Risk Factor: Checking blood sugar at home No   Risk Factor: Second-hand smoke at home No   Risk Factor: How much caffeine per day 1-2 cups a day   Who completed this questionnaire? Someone on behalf of the patient because of cognitive or language limitation             Assessment and Plan       # Memory issues/confusion x >1 year  CT showing chronic infarct, unable to have MRI due to claustrophobia, evaluate for neurocognitive disorders  # April 2025 Blurred vision, Dizziness/lightheadedness and syncopal event  Possibly related to Afib with RVR  # Likely chronic right parietal ischemic stroke  Unclear etiology potentially cardioembolic from atrial fibrillation  # Atrial fibrillation, [id number]  -Continue Eliquis 5 mg twice daily  (instructed patient not to stop without stroke neurology involvement)  -Follow-up again with Torri Omer PA-C in 6 months   -Referred to neuropsychology for formal neurocognitive testing  -Education provided about stroke BE FAST and ICH symptoms and GI bleeding risk     # Hyperlipidemia  -Recheck lipid panel now, if LDL is greater than or equal to 40 mg/dL would recommend restarting atorvastatin at a reduced dose  -LDL goal 40-70, <40 increases risk of intracranial hemorrhage  -Defer to PCP for ongoing titration    # Hypertension  -Defer to PCP for antihypertensive management.   - BP goal <130/80(with tighter control if tolerated)    # Prediabetes  -Defer to PCP for diabetes prevention, long-term A1c goal less than 7%.    # Obstructive sleep apnea  -recommend nightly use of CPAP    # Anhedonia  -Please reach out if symptoms worsen or you would like referral for mental health specialist  -Crisis resources sent     # Tobacco use disorder  - recommend complete cessation of tobacco products    - Return in about 6 months (around 1/23/2026) for with Torri Omer PA-C.    Stroke Education provided.  He will call us with any questions.  For any acute neurologic deficits he was advised to  go directly to the hospital rather than call the clinic.      Torri Omer PA-C  Neurology  07/23/2025     ____________________________________________________________________    Billing:  Please note: for coding purposes this visit should be billed only as established and not new, since this patient was seen by my same subspecialty team ([facility]) during the hospitalization that this visit is a follow up for.  I spent a total of 60 minutes on the day of the visit.   Time spent by me today doing chart review, history and exam, documentation and further activities per the note    *All or a portion of this note was generated using voice recognition software and may contain transcription errors.

## 2025-07-23 ENCOUNTER — OFFICE VISIT (OUTPATIENT)
Dept: NEUROLOGY | Facility: CLINIC | Age: 60
End: 2025-07-23
Attending: PHYSICIAN ASSISTANT
Payer: COMMERCIAL

## 2025-07-23 VITALS
DIASTOLIC BLOOD PRESSURE: 55 MMHG | WEIGHT: 315 LBS | HEART RATE: 87 BPM | SYSTOLIC BLOOD PRESSURE: 94 MMHG | BODY MASS INDEX: 44.62 KG/M2

## 2025-07-23 DIAGNOSIS — E78.5 HYPERLIPIDEMIA LDL GOAL <70: ICD-10-CM

## 2025-07-23 DIAGNOSIS — R41.3 MEMORY LOSS: ICD-10-CM

## 2025-07-23 DIAGNOSIS — I63.9 CEREBROVASCULAR ACCIDENT (CVA), UNSPECIFIED MECHANISM (H): Primary | ICD-10-CM

## 2025-07-23 DIAGNOSIS — I10 ESSENTIAL HYPERTENSION: ICD-10-CM

## 2025-07-23 DIAGNOSIS — R45.84 ANHEDONIA: ICD-10-CM

## 2025-07-23 DIAGNOSIS — G47.33 OSA (OBSTRUCTIVE SLEEP APNEA): ICD-10-CM

## 2025-07-23 DIAGNOSIS — I48.91 ATRIAL FIBRILLATION WITH RVR (H): ICD-10-CM

## 2025-07-23 DIAGNOSIS — F17.200 TOBACCO USE DISORDER: ICD-10-CM

## 2025-07-23 DIAGNOSIS — R73.03 PREDIABETES: ICD-10-CM

## 2025-07-23 PROCEDURE — 3074F SYST BP LT 130 MM HG: CPT | Performed by: PHYSICIAN ASSISTANT

## 2025-07-23 PROCEDURE — 3078F DIAST BP <80 MM HG: CPT | Performed by: PHYSICIAN ASSISTANT

## 2025-07-23 PROCEDURE — 99215 OFFICE O/P EST HI 40 MIN: CPT | Performed by: PHYSICIAN ASSISTANT

## 2025-07-23 PROCEDURE — 99417 PROLNG OP E/M EACH 15 MIN: CPT | Performed by: PHYSICIAN ASSISTANT

## 2025-07-23 NOTE — NURSING NOTE
Chief Complaint   Patient presents with    Stroke     Hospital follow up.  Patient would like to discuss what they can do to help regain short term memory.  Once in a while gets a quick dizziness and would need to take a rest.       Megan Bain CMA on 7/23/2025 at 9:51 AM

## 2025-07-23 NOTE — PATIENT INSTRUCTIONS
-Continue taking Eliquis 5 mg twice daily   -Recheck lipid panel now, if LDL is greater than or equal to 40 mg/dL would recommend restarting atorvastatin at a reduced dose  -Follow-up with your primary care provider for ongoing management and monitoring of blood pressure (goal less than 130 top number (systolic)/ 80 bottom number (diastolic), cholesterol (goal LDL 40-70), and diabetes prevention  (long term A1c goal <7%)  -Follow-up again with Torri Omer PA-C in 6 months  -Follow-up with neuropsychology team for memory concerns     Daily activities:  -monitor your blood pressure at home twice daily in AM and PM , keep log and bring to medical visits   -recommend nightly use of CPAP  -recommend tobacco cessation  -I recommend Mediterranean diet (see attachment), moderate aerobic exercise at least 30 minutes/day x 5 days/week    Precautions:  - There is a higher risk with surgical procedures following stroke (~12% risk within 3 months, ~ 5% risk at 3-6 months, ~ 2% risk at 6-12 months).  - If you have any bleeding or black/tarry stools, you must let your PCP or me know. If any future providers request that you hold or stop taking Eliquis then please reach out to our stroke team to be included in the discussion.  - Call our stroke clinic with any questions or concerns at [phone], or send a [facility] medical advice message  - Call 911 with any new stroke symptoms: 'BE FAST' (Hand-out attached)     *Any results will be communicated via [facility] portal message or phone.    It was a pleasure meeting you today. You are certainly welcome to reach out with any additional questions.

## 2025-07-23 NOTE — LETTER
7/23/2025      Michael Esteban  [address] [id number]      Dear Colleague,    Thank you for referring your patient, Michael Esteban, to the [facility] [place]. Please see a copy of my visit note below.    li__________________________________________________________      [facility]   [phone]  __________________________________________________________         History of Present Illness   Chief Complaint: Patient presents with:  Stroke: Hospital follow up.  Patient would like to discuss what they can do to help regain short term memory.  Once in a while gets a quick dizziness and would need to take a rest.        Michael Esteban is a 60 year old male presenting for follow-up for stroke.     He was hospitalized at [facility] on 4/29/2025. Prior to the hospital stay, he had a past medical history of AAA without rupture s/p repair in 2015, like kidney donor s/p nephrectomy, gout, HTN, HLD, BERNADETTE on CPAP, tobacco use disorder.  On PTA ASA 81 mg daily and atorvastatin 40 mg daily..    He presented to the hospital with several days of palpitations/racing heart, blurred vision, and dizziness (lightheadedness) followed by syncopal episode.  Also endorses memory issues/confusion when performing tasks at work leading him to lose his job as a .  Found to be in A-fib with RVR in clinic and sent to [facility] emergency department via EMS.  CT x2 showed a likely chronic right parietal infarct, vessels with mild carotid artery and vertebral artery atherosclerosis without significant stenosis.  He was unable to have MRI due to claustrophobia but was felt low yield due to no new focal deficits.    PTA aspirin was switched to Eliquis for recurrent stroke prevention, atorvastatin was discontinued due to LDL 22. Since being discharged, he endorses some persistent dizziness and short term memory issues. Per wife during visit  "today he will often repeat questions during the day such as asking what day it is or about what the schedule is for the day/week.  Memory concerns were present for approximately 1.5 years prior to his hospitalization but it does seem as though symptoms had been getting worse.  A couple days prior to coming to the hospital he lost his job as he was unable to remember how to use a new application.    He will occasionally feel generalized weakness and as though his knees are \"wobbly\", denies any unilateral weakness.  Occasionally he needs to sit down if he feels lightheaded.  He has plans to have an ablation in a couple weeks.  Denies any racing heart or palpitations currently.  He denies any bleeding issues or black/tarry stools taking his Eliquis.  He has not been checking his blood pressure but is open to monitoring.    Modified Meeker Scale  Score: 3-Moderate disability; requiring some help, but able to walk without assistance    Stroke Evaluation Summarized:  New results resulted and reviewed by me today are in BOLD below.  I personally reviewed the following neuroimaging studies today and the comments above reflect my own personal interpretation of the images: images: CT head, CTA head/neck, and I also showed CT to the patient myself today.    MRI/Head CT MRI Brain: Not performed due to claustrophobia     CT Head 4/29/25: chronic appearing right parietal infarct, no definite acute infarct or hemorrhage   Intracranial Vasculature CTA 4/29/25: no significant stenosis   Cervical Vasculature CTA 4/29/25: mild carotid and vertebral artery atherosclerosis      Echocardiogram 4/30/2025: Decreased LVEF to 40-45%, new apical hypokinesis, stable aortic root and ascending aorta dilation (compared to 2017)   EKG/Telemetry 4/29/2025: Atrial flutter     Other Testing Not Applicable      Labs Lab Results   Component Value Date    LDL 22 04/29/2025    A1C 6.1 (H) 04/29/2025    CTROPT 10 05/29/2025    INR 1.09 06/10/2025    INR " 1.00 03/28/2016               Home Medications     Current Outpatient Medications   Medication Sig Dispense Refill     acetaminophen (TYLENOL) 500 MG tablet Take 500 mg by mouth every 6 hours as needed for mild pain       allopurinol (ZYLOPRIM) 300 MG tablet Take 300 mg by mouth daily.       apixaban ANTICOAGULANT (ELIQUIS) 5 MG tablet Take 1 tablet (5 mg) by mouth 2 times daily. 180 tablet 3     carvedilol (COREG) 25 MG tablet Take 1 tablet (25 mg) by mouth 2 times daily (with meals). 180 tablet 3     digoxin (LANOXIN) 125 MCG tablet Take 1 tablet (125 mcg) by mouth daily. 90 tablet 3     levothyroxine (SYNTHROID/LEVOTHROID) 75 MCG tablet Take 1 tablet (75 mcg) by mouth daily 90 tablet 3     lisinopril (ZESTRIL) 10 MG tablet Take 1 tablet (10 mg) by mouth at bedtime. 90 tablet 3     sildenafil (VIAGRA) 50 MG tablet Take 1 tablet (50 mg) by mouth daily as needed (for erectile dysfunction) 30 min to 4 hrs before sex. Do not use with nitroglycerin, terazosin or doxazosin. 12 tablet 3     order for DME Equipment being ordered: RESMED AIRSENSE 10 WITH HH AND RESMED AIRFIT P10 PILLOW MASK. (Patient not taking: Reported on 7/11/2025)       No current facility-administered medications for this visit.            Physical Examination     Physical Exam    Estimated body mass index is 44.62 kg/m  as calculated from the following:    Height as of 7/11/25: 1.829 m (6').    Weight as of this encounter: 149.2 kg (329 lb).    BP 94/55 (BP Location: Right arm, Patient Position: Sitting)   Pulse 87   Wt (!) 149.2 kg (329 lb)   BMI 44.62 kg/m         General Exam  General: Sitting up in chair in no acute distress  Pulmonary:  no respiratory distress    Neurologic:  Mental Status:  alert, oriented x 3, follows commands, speech clear and fluent, naming and repetition normal, able to spell WORLD backwards and state days of the week backwards, unable to count backwards from 100 by 7s  Cranial Nerves:  visual fields intact, EOMI with  normal smooth pursuit, facial sensation intact and symmetric, facial movements symmetric, hearing not formally tested but intact to conversation, palate elevation symmetric and uvula midline, no dysarthria, tongue protrusion midline  Motor:  normal muscle tone and bulk, no abnormal movements, able to move all limbs spontaneously, strength 5/5 throughout upper and lower extremities other than limited ROM to R shoulder due to chronic rotator cuff injury, no pronator drift  Sensory:  light touch sensation intact and symmetric throughout upper and lower extremities, no extinction on double simultaneous stimulation   Coordination:  normal finger-to-nose and heel-to-shin bilaterally without dysmetria  Station/Gait:  deferred           Screenings and Questionnaires:     Tobacco:    Tobacco Use      Smoking status: Some Days        Types: Pipe, Cigars      Smokeless tobacco: Never      Tobacco comments: strongly encourage full cessation secondary to TAA. Occ. cigar     -Smoking screen: Tobacco cigar use approximately 2-3 cigars/week.  -Alcohol screen: Will have 2-3 beers a couple times per week.  -Drug screen: Endorses marijuana use but no other illicit drug use.  -B symptom screen: Denies fevers, night sweats, or unintentional weight loss    Sleep Apnea:   -Sleep Apnea screen: Has BERNADETTE, was using CPAP but not using it recently.  Denies any intolerance but states it does take him a while to get back into the rhythm/habit of using it    -Depression screen: He denies any feelings of being down/depressed/hopeless however his wife reports that she is concerned with him being depressed and has noticed significant symptoms of anhedonia.  He denies any thoughts of self-harm and declines referral to mental health specialist at this time    Depression:      7/23/2025     9:52 AM 11/15/2018     2:41 PM   PHQ-2 ( 1999 [employer])   Q1: Little interest or pleasure in doing things 0 1   Q2: Feeling down, depressed or hopeless 0 1   PHQ-2  Score 0 2    PHQ-2 Total Score (12-17 Years)- Positive if 3 or more points; Administer PHQ-A if positive  2       Data saved with a previous flowsheet row definition       Stroke Recovery and Risk Factors Questionnaire:      7/18/2025     9:41 AM   Stroke Questionnaire   Residual effects: Any residual effects from stroke? Yes, I do   Residual effects: Most bothersome symptom short term memory loss, A-flutter   Level of independence: Walking Need some help   Level of independence: Eating Independent   Level of independence: Stairs Independent   Level of independence: Dressing Independent   Level of independence: Bathing Independent   Level of independence: Toileting Independent   Incontinence: Bowel? No   Incontinence: Bladder? No   Able to: Use electronics Yes   Able to: Cook or do house chores No   Able to: Do own shopping, including online No   Able to: Manage own finances No   Current therapy: Physical Therapy No   Current therapy: Occupation Therapy Yes   Current therapy: How often OT 2-3 weeks   Current therapy: Speech Therapy No   Current therapy: Other No   Current services: Home Health No, not needed   Medication: How do you take your meds Myself, from a pillbox that someone else sets up   Medication: Do you ever miss or forget meds Rarely   Risk Factor: Checking blood pressure at home No, I have a blood pressure cuff but do not use it   Risk Factor: Why not checking BP at home Other   Risk Factor: Other reason not checking BP don't want to   Risk Factor: Checking blood sugar at home No   Risk Factor: Second-hand smoke at home No   Risk Factor: How much caffeine per day 1-2 cups a day   Who completed this questionnaire? Someone on behalf of the patient because of cognitive or language limitation             Assessment and Plan       # Memory issues/confusion x >1 year  CT showing chronic infarct, unable to have MRI due to claustrophobia, evaluate for neurocognitive disorders  # April 2025 Blurred vision,  Dizziness/lightheadedness and syncopal event  Possibly related to Afib with RVR  # Likely chronic right parietal ischemic stroke  Unclear etiology potentially cardioembolic from atrial fibrillation  # Atrial fibrillation, [id number]  -Continue Eliquis 5 mg twice daily (instructed patient not to stop without stroke neurology involvement)  -Follow-up again with Torri Omer PA-C in 6 months   -Referred to neuropsychology for formal neurocognitive testing  -Education provided about stroke BE FAST and ICH symptoms and GI bleeding risk     # Hyperlipidemia  -Recheck lipid panel now, if LDL is greater than or equal to 40 mg/dL would recommend restarting atorvastatin at a reduced dose  -LDL goal 40-70, <40 increases risk of intracranial hemorrhage  -Defer to PCP for ongoing titration    # Hypertension  -Defer to PCP for antihypertensive management.   - BP goal <130/80(with tighter control if tolerated)    # Prediabetes  -Defer to PCP for diabetes prevention, long-term A1c goal less than 7%.    # Obstructive sleep apnea  -recommend nightly use of CPAP    # Anhedonia  -Please reach out if symptoms worsen or you would like referral for mental health specialist  -Crisis resources sent     # Tobacco use disorder  - recommend complete cessation of tobacco products    - Return in about 6 months (around 1/23/2026) for with Torri Omer PA-C.    Stroke Education provided.  He will call us with any questions.  For any acute neurologic deficits he was advised to  go directly to the hospital rather than call the clinic.      Torri Omer PA-C  Neurology  07/23/2025     ____________________________________________________________________    Billing:  Please note: for coding purposes this visit should be billed only as established and not new, since this patient was seen by my same subspecialty team ([facility]) during the hospitalization that this visit is a follow up for.  I spent a total of 60 minutes  on the day of the visit.   Time spent by me today doing chart review, history and exam, documentation and further activities per the note    *All or a portion of this note was generated using voice recognition software and may contain transcription errors.      Again, thank you for allowing me to participate in the care of your patient.        Sincerely,        Torri Omer PA-C    Electronically signed

## 2025-08-04 ENCOUNTER — TELEPHONE (OUTPATIENT)
Dept: CARDIOLOGY | Facility: CLINIC | Age: 60
End: 2025-08-04
Payer: COMMERCIAL

## 2025-08-07 ENCOUNTER — TELEPHONE (OUTPATIENT)
Dept: CARDIOLOGY | Facility: CLINIC | Age: 60
End: 2025-08-07
Payer: COMMERCIAL

## 2025-08-07 DIAGNOSIS — I48.3 TYPICAL ATRIAL FLUTTER (H): Primary | ICD-10-CM

## 2025-08-07 RX ORDER — SODIUM CHLORIDE, SODIUM LACTATE, POTASSIUM CHLORIDE, CALCIUM CHLORIDE 600; 310; 30; 20 MG/100ML; MG/100ML; MG/100ML; MG/100ML
INJECTION, SOLUTION INTRAVENOUS CONTINUOUS
OUTPATIENT
Start: 2025-08-07

## 2025-08-07 RX ORDER — LIDOCAINE 40 MG/G
CREAM TOPICAL
OUTPATIENT
Start: 2025-08-07

## 2025-08-08 ENCOUNTER — HOSPITAL ENCOUNTER (OUTPATIENT)
Facility: CLINIC | Age: 60
Discharge: HOME | End: 2025-08-08
Attending: INTERNAL MEDICINE | Admitting: INTERNAL MEDICINE
Payer: COMMERCIAL

## 2025-08-08 ASSESSMENT — ACTIVITIES OF DAILY LIVING (ADL)
ADLS_ACUITY_SCORE: 49

## 2025-08-11 ENCOUNTER — TELEPHONE (OUTPATIENT)
Dept: CARDIOLOGY | Facility: CLINIC | Age: 60
End: 2025-08-11
Payer: COMMERCIAL

## 2025-08-20 ENCOUNTER — TELEPHONE (OUTPATIENT)
Dept: NEUROLOGY | Facility: CLINIC | Age: 60
End: 2025-08-20
Payer: COMMERCIAL

## (undated) DEVICE — CATH ANGIO INFINITI JR4 4FRX100CM 538421

## (undated) DEVICE — CATH ANGIO JUDKINS JL3.5 6FRX100CM INFINITI 534618T

## (undated) DEVICE — KIT HAND CONTROL ANGIOTOUCH ACIST 65CM AT-P65

## (undated) DEVICE — MANIFOLD KIT ANGIO AUTOMATED 014613

## (undated) DEVICE — CATH ANGIO 6FR X 100CM INFINIT

## (undated) DEVICE — DEFIB PRO-PADZ LVP LQD GEL ADULT 8900-2105-01

## (undated) DEVICE — SLEEVE TR BAND RADIAL COMPRESSION DEVICE 29CM XX-RF06L

## (undated) DEVICE — INTRO GLIDESHEATH SLENDER 6FR 10X45CM 60-1060

## (undated) DEVICE — GUIDEWIRE J TIP 1.5MM 210CM

## (undated) RX ORDER — REGADENOSON 0.08 MG/ML
INJECTION, SOLUTION INTRAVENOUS
Status: DISPENSED
Start: 2025-05-01

## (undated) RX ORDER — LIDOCAINE HYDROCHLORIDE 10 MG/ML
INJECTION, SOLUTION EPIDURAL; INFILTRATION; INTRACAUDAL; PERINEURAL
Status: DISPENSED
Start: 2025-06-10

## (undated) RX ORDER — HEPARIN SODIUM 1000 [USP'U]/ML
INJECTION, SOLUTION INTRAVENOUS; SUBCUTANEOUS
Status: DISPENSED
Start: 2025-06-10

## (undated) RX ORDER — DIAZEPAM 5 MG
TABLET ORAL
Status: DISPENSED
Start: 2017-04-21

## (undated) RX ORDER — NITROGLYCERIN 5 MG/ML
VIAL (ML) INTRAVENOUS
Status: DISPENSED
Start: 2025-06-10

## (undated) RX ORDER — VERAPAMIL HYDROCHLORIDE 2.5 MG/ML
INJECTION INTRAVENOUS
Status: DISPENSED
Start: 2025-06-10

## (undated) RX ORDER — FENTANYL CITRATE 50 UG/ML
INJECTION, SOLUTION INTRAMUSCULAR; INTRAVENOUS
Status: DISPENSED
Start: 2025-06-10